# Patient Record
Sex: MALE | Race: WHITE | NOT HISPANIC OR LATINO | URBAN - METROPOLITAN AREA
[De-identification: names, ages, dates, MRNs, and addresses within clinical notes are randomized per-mention and may not be internally consistent; named-entity substitution may affect disease eponyms.]

---

## 2017-06-05 ENCOUNTER — OUTPATIENT (OUTPATIENT)
Dept: OUTPATIENT SERVICES | Facility: HOSPITAL | Age: 82
LOS: 1 days | Discharge: HOME | End: 2017-06-05

## 2017-06-07 DIAGNOSIS — I48.91 UNSPECIFIED ATRIAL FIBRILLATION: ICD-10-CM

## 2017-06-07 DIAGNOSIS — H26.9 UNSPECIFIED CATARACT: ICD-10-CM

## 2017-06-07 DIAGNOSIS — M19.90 UNSPECIFIED OSTEOARTHRITIS, UNSPECIFIED SITE: ICD-10-CM

## 2017-06-07 DIAGNOSIS — N40.0 BENIGN PROSTATIC HYPERPLASIA WITHOUT LOWER URINARY TRACT SYMPTOMS: ICD-10-CM

## 2018-06-15 ENCOUNTER — EMERGENCY (EMERGENCY)
Facility: HOSPITAL | Age: 83
LOS: 1 days | Discharge: HOME | End: 2018-06-15
Admitting: EMERGENCY MEDICINE
Payer: MEDICARE

## 2018-06-15 VITALS
OXYGEN SATURATION: 98 % | RESPIRATION RATE: 18 BRPM | SYSTOLIC BLOOD PRESSURE: 119 MMHG | DIASTOLIC BLOOD PRESSURE: 78 MMHG | TEMPERATURE: 98 F | HEART RATE: 71 BPM

## 2018-06-15 VITALS
SYSTOLIC BLOOD PRESSURE: 150 MMHG | DIASTOLIC BLOOD PRESSURE: 90 MMHG | RESPIRATION RATE: 18 BRPM | TEMPERATURE: 98 F | OXYGEN SATURATION: 100 % | HEART RATE: 70 BPM

## 2018-06-15 DIAGNOSIS — I48.91 UNSPECIFIED ATRIAL FIBRILLATION: ICD-10-CM

## 2018-06-15 DIAGNOSIS — F10.120 ALCOHOL ABUSE WITH INTOXICATION, UNCOMPLICATED: ICD-10-CM

## 2018-06-15 DIAGNOSIS — E78.5 HYPERLIPIDEMIA, UNSPECIFIED: ICD-10-CM

## 2018-06-15 DIAGNOSIS — R41.82 ALTERED MENTAL STATUS, UNSPECIFIED: ICD-10-CM

## 2018-06-15 DIAGNOSIS — Z23 ENCOUNTER FOR IMMUNIZATION: ICD-10-CM

## 2018-06-15 DIAGNOSIS — Z87.891 PERSONAL HISTORY OF NICOTINE DEPENDENCE: ICD-10-CM
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
## 2018-06-15 DIAGNOSIS — I10 ESSENTIAL (PRIMARY) HYPERTENSION: ICD-10-CM

## 2018-06-15 LAB
ALBUMIN SERPL ELPH-MCNC: 3.5 G/DL — SIGNIFICANT CHANGE UP (ref 3.4–5)
ALP SERPL-CCNC: 62 U/L — SIGNIFICANT CHANGE UP (ref 40–120)
ALT FLD-CCNC: 18 U/L — SIGNIFICANT CHANGE UP (ref 12–42)
ANION GAP SERPL CALC-SCNC: 11 MMOL/L — SIGNIFICANT CHANGE UP (ref 9–16)
ANION GAP SERPL CALC-SCNC: 13 MMOL/L — SIGNIFICANT CHANGE UP (ref 9–16)
AST SERPL-CCNC: 33 U/L — SIGNIFICANT CHANGE UP (ref 15–37)
BILIRUB SERPL-MCNC: 0.4 MG/DL — SIGNIFICANT CHANGE UP (ref 0.2–1.2)
BUN SERPL-MCNC: 38 MG/DL — HIGH (ref 7–23)
BUN SERPL-MCNC: 42 MG/DL — HIGH (ref 7–23)
CALCIUM SERPL-MCNC: 7.8 MG/DL — LOW (ref 8.5–10.5)
CALCIUM SERPL-MCNC: 8.4 MG/DL — LOW (ref 8.5–10.5)
CHLORIDE SERPL-SCNC: 107 MMOL/L — SIGNIFICANT CHANGE UP (ref 96–108)
CHLORIDE SERPL-SCNC: 111 MMOL/L — HIGH (ref 96–108)
CK SERPL-CCNC: 215 U/L — SIGNIFICANT CHANGE UP (ref 39–308)
CO2 SERPL-SCNC: 20 MMOL/L — LOW (ref 22–31)
CO2 SERPL-SCNC: 22 MMOL/L — SIGNIFICANT CHANGE UP (ref 22–31)
CREAT SERPL-MCNC: 1.47 MG/DL — HIGH (ref 0.5–1.3)
CREAT SERPL-MCNC: 1.61 MG/DL — HIGH (ref 0.5–1.3)
ETHANOL SERPL-MCNC: 126 MG/DL — HIGH
GLUCOSE SERPL-MCNC: 100 MG/DL — HIGH (ref 70–99)
GLUCOSE SERPL-MCNC: 94 MG/DL — SIGNIFICANT CHANGE UP (ref 70–99)
HCT VFR BLD CALC: 36.7 % — LOW (ref 39–50)
HGB BLD-MCNC: 12.1 G/DL — LOW (ref 13–17)
MAGNESIUM SERPL-MCNC: 2.1 MG/DL — SIGNIFICANT CHANGE UP (ref 1.6–2.6)
MCHC RBC-ENTMCNC: 32.1 PG — SIGNIFICANT CHANGE UP (ref 27–34)
MCHC RBC-ENTMCNC: 33 G/DL — SIGNIFICANT CHANGE UP (ref 32–36)
MCV RBC AUTO: 97.3 FL — SIGNIFICANT CHANGE UP (ref 80–100)
PLATELET # BLD AUTO: 175 K/UL — SIGNIFICANT CHANGE UP (ref 150–400)
POTASSIUM SERPL-MCNC: 3.6 MMOL/L — SIGNIFICANT CHANGE UP (ref 3.5–5.3)
POTASSIUM SERPL-MCNC: 4.7 MMOL/L — SIGNIFICANT CHANGE UP (ref 3.5–5.3)
POTASSIUM SERPL-SCNC: 3.6 MMOL/L — SIGNIFICANT CHANGE UP (ref 3.5–5.3)
POTASSIUM SERPL-SCNC: 4.7 MMOL/L — SIGNIFICANT CHANGE UP (ref 3.5–5.3)
PROT SERPL-MCNC: 7.9 G/DL — SIGNIFICANT CHANGE UP (ref 6.4–8.2)
RBC # BLD: 3.77 M/UL — LOW (ref 4.2–5.8)
RBC # FLD: 14.3 % — SIGNIFICANT CHANGE UP (ref 10.3–16.9)
SODIUM SERPL-SCNC: 140 MMOL/L — SIGNIFICANT CHANGE UP (ref 132–145)
SODIUM SERPL-SCNC: 144 MMOL/L — SIGNIFICANT CHANGE UP (ref 132–145)
TROPONIN I SERPL-MCNC: <0.017 NG/ML — LOW (ref 0.02–0.06)
WBC # BLD: 5.4 K/UL — SIGNIFICANT CHANGE UP (ref 3.8–10.5)
WBC # FLD AUTO: 5.4 K/UL — SIGNIFICANT CHANGE UP (ref 3.8–10.5)

## 2018-06-15 PROCEDURE — 93010 ELECTROCARDIOGRAM REPORT: CPT

## 2018-06-15 PROCEDURE — 70450 CT HEAD/BRAIN W/O DYE: CPT | Mod: 26

## 2018-06-15 PROCEDURE — 99284 EMERGENCY DEPT VISIT MOD MDM: CPT | Mod: 25

## 2018-06-15 RX ORDER — ACETAMINOPHEN 500 MG
650 TABLET ORAL ONCE
Qty: 0 | Refills: 0 | Status: COMPLETED | OUTPATIENT
Start: 2018-06-15 | End: 2018-06-15

## 2018-06-15 RX ORDER — TETANUS TOXOID, REDUCED DIPHTHERIA TOXOID AND ACELLULAR PERTUSSIS VACCINE, ADSORBED 5; 2.5; 8; 8; 2.5 [IU]/.5ML; [IU]/.5ML; UG/.5ML; UG/.5ML; UG/.5ML
0.5 SUSPENSION INTRAMUSCULAR ONCE
Qty: 0 | Refills: 0 | Status: COMPLETED | OUTPATIENT
Start: 2018-06-15 | End: 2018-06-15

## 2018-06-15 RX ORDER — SODIUM CHLORIDE 9 MG/ML
1000 INJECTION INTRAMUSCULAR; INTRAVENOUS; SUBCUTANEOUS ONCE
Qty: 0 | Refills: 0 | Status: COMPLETED | OUTPATIENT
Start: 2018-06-15 | End: 2018-06-15

## 2018-06-15 RX ORDER — KETOROLAC TROMETHAMINE 30 MG/ML
15 SYRINGE (ML) INJECTION ONCE
Qty: 0 | Refills: 0 | Status: DISCONTINUED | OUTPATIENT
Start: 2018-06-15 | End: 2018-06-15

## 2018-06-15 RX ORDER — FAMOTIDINE 10 MG/ML
20 INJECTION INTRAVENOUS ONCE
Qty: 0 | Refills: 0 | Status: COMPLETED | OUTPATIENT
Start: 2018-06-15 | End: 2018-06-15

## 2018-06-15 RX ADMIN — FAMOTIDINE 20 MILLIGRAM(S): 10 INJECTION INTRAVENOUS at 22:20

## 2018-06-15 RX ADMIN — SODIUM CHLORIDE 1000 MILLILITER(S): 9 INJECTION INTRAMUSCULAR; INTRAVENOUS; SUBCUTANEOUS at 22:21

## 2018-06-15 RX ADMIN — Medication 650 MILLIGRAM(S): at 22:44

## 2018-06-15 RX ADMIN — SODIUM CHLORIDE 2000 MILLILITER(S): 9 INJECTION INTRAMUSCULAR; INTRAVENOUS; SUBCUTANEOUS at 21:07

## 2018-06-15 RX ADMIN — SODIUM CHLORIDE 2000 MILLILITER(S): 9 INJECTION INTRAMUSCULAR; INTRAVENOUS; SUBCUTANEOUS at 22:20

## 2018-06-15 RX ADMIN — TETANUS TOXOID, REDUCED DIPHTHERIA TOXOID AND ACELLULAR PERTUSSIS VACCINE, ADSORBED 0.5 MILLILITER(S): 5; 2.5; 8; 8; 2.5 SUSPENSION INTRAMUSCULAR at 22:20

## 2018-06-15 NOTE — ED PROVIDER NOTE - CARE PLAN
Principal Discharge DX:	Atrial fibrillation Principal Discharge DX:	Atrial fibrillation  Secondary Diagnosis:	Altered mental status  Secondary Diagnosis:	Alcoholic intoxication without complication

## 2018-06-15 NOTE — ED ADULT NURSE NOTE - OBJECTIVE STATEMENT
Pt presents to ED with c/o fall and altered mental status- + AOB, admits to drinking today, abrasion and bruising noted to scalp, PERRLA, GCS 15. not on AC's, no LOC

## 2018-06-15 NOTE — ED ADULT TRIAGE NOTE - CHIEF COMPLAINT QUOTE
neighbor called because pt was drinking alcohol, admits to drinking a bottle of vodka and fell at home. pt denies LOC has abrasion to right scalp. awake alert and oriented times 3 in triage, ambulating well.

## 2018-06-15 NOTE — ED ADULT NURSE REASSESSMENT NOTE - NS ED NURSE REASSESS COMMENT FT1
Pt with baseline aphasia and right sided hemiplegia from previous stroke, [facility] BRBPR noted at change of shift - second PVL placed and repeat labs drawn, pt placed on cardiac monitor. MD Vasquez aware and at beside for eval. VSS, patient at her baseline mental status per family. Safety and comfort maintained, will continue to monitor.

## 2018-06-15 NOTE — ED PROVIDER NOTE - OBJECTIVE STATEMENT
89 yo M With PMHx of HTN, HLD 89 yo M With PMHx of HTN, HLD, BPH, not on any AC/ASA, presenting for scalp abrasion and generalized weakness s/p fall at home.  Pt admits to drinking alcohol all day today and lost his balance, fell at home and landed on his scalp region.  Sustained abrasion to the R frontal region, but felt too weak to get up on his own and called for EMS.  Now with abrasion and mild HA.  Denies other prodromes, LOC, bleeding, dizziness, SOB, CP, palpitations, N/V, change in ROM/sensation, abdominal/back/neck pain, focal weakness, change in vision/mental status/speech, paresthesia, and malaise.

## 2018-06-15 NOTE — ED PROVIDER NOTE - MEDICAL DECISION MAKING DETAILS
pt BIBA for AMS, scalp contusion s/p fall at home after alcohol intox, labs with mildly elevated BUN/Cr, likely 2/2 dehydration, no other associated trauma noted, CTH with no ICH noted, EKG with slow A.fib w rate of 80s, no ST-T wave changes, pt reports no h/o A.fib or cardiac arrhythmia in the past, was told he cannot take ASA or NSAIDs by PMD, no active CP or other sx currently, cardiology Dr. Mendoza made aware, given pt's rate is well controlled and unknown prior EKG and unknown chronicity, trop neg, medically cleared for outpt f/u, repeat labs with improved renal function s/p IVF, AxOx3 and conversive with steady gait, will dc home to f/u with PMD and cardiology as outpt, strict return precautions discussed, pt verbalized understanding

## 2018-06-15 NOTE — ED ADULT NURSE NOTE - CHPI ED SYMPTOMS NEG
no deformity/no fever/no tingling/no confusion/no loss of consciousness/no bleeding/no numbness/no vomiting/no weakness

## 2018-06-15 NOTE — ED PROVIDER NOTE - PROGRESS NOTE DETAILS
pt with improved mental status, EKG with slow A.fib w rate of 80s, no ST-T wave changes, pt reports no h/o A.fib or cardiac arrhythmia in the past, was told he cannot take ASA or NSAIDs by PMD, no active CP or other sx currently, cardiology Dr. Mendoza made aware, given pt's rate is well controlled and unknown prior EKG and unknown chronicity, trop neg, medically cleared for outpt f/u

## 2018-06-15 NOTE — ED PROVIDER NOTE - PHYSICAL EXAMINATION
Vital Signs - nursing notes reviewed and confirmed  Gen - WDWN M, +AOB, NAD, comfortable and non-toxic appearing, speaking in full sentences   Skin - warm, dry, abrasion to the R frontal scalp region, no laceration   HEENT - AT/NC, PERRL, EOMI, no conjunctival injection, moist oral mucosa, o/p clear with no erythema, edema, or exudate, uvula midline, airway patent, neck supple and NT, FROM, no JVD or carotid bruits b/l, no palpable nodes  CV - S1S2, R/R/R  Resp - respiration non-labored, CTAB, symmetric bs b/l, no r/r/w  GI - NABS, soft, ND, NT, no rebound or guarding, no CVAT b/l   MS - w/w/p, no c/c/e, calves supple and NT, distal pulses symmetric b/l   Neuro - AxOx3, no focal neuro deficits, CN II-XII grossly intact, clear speech, unsteady gait

## 2018-08-03 ENCOUNTER — EMERGENCY (EMERGENCY)
Facility: HOSPITAL | Age: 83
LOS: 1 days | Discharge: HOME | End: 2018-08-03
Attending: EMERGENCY MEDICINE | Admitting: EMERGENCY MEDICINE
Payer: MEDICARE

## 2018-08-03 VITALS
OXYGEN SATURATION: 98 % | DIASTOLIC BLOOD PRESSURE: 67 MMHG | SYSTOLIC BLOOD PRESSURE: 107 MMHG | RESPIRATION RATE: 20 BRPM | TEMPERATURE: 98 F | HEART RATE: 68 BPM
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
## 2018-08-03 DIAGNOSIS — S01.81XA LACERATION WITHOUT FOREIGN BODY OF OTHER PART OF HEAD, INITIAL ENCOUNTER: ICD-10-CM

## 2018-08-03 DIAGNOSIS — S51.002A UNSPECIFIED OPEN WOUND OF LEFT ELBOW, INITIAL ENCOUNTER: ICD-10-CM
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
## 2018-08-03 DIAGNOSIS — Z23 ENCOUNTER FOR IMMUNIZATION: ICD-10-CM

## 2018-08-03 DIAGNOSIS — S60.812A ABRASION OF LEFT WRIST, INITIAL ENCOUNTER: ICD-10-CM

## 2018-08-03 DIAGNOSIS — I10 ESSENTIAL (PRIMARY) HYPERTENSION: ICD-10-CM

## 2018-08-03 DIAGNOSIS — S61.412A LACERATION WITHOUT FOREIGN BODY OF LEFT HAND, INITIAL ENCOUNTER: ICD-10-CM

## 2018-08-03 DIAGNOSIS — Z88.6 ALLERGY STATUS TO ANALGESIC AGENT: ICD-10-CM

## 2018-08-03 DIAGNOSIS — E78.5 HYPERLIPIDEMIA, UNSPECIFIED: ICD-10-CM
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
## 2018-08-03 LAB
ALBUMIN SERPL ELPH-MCNC: 3.4 G/DL — SIGNIFICANT CHANGE UP (ref 3.4–5)
ALP SERPL-CCNC: 52 U/L — SIGNIFICANT CHANGE UP (ref 40–120)
ALT FLD-CCNC: 19 U/L — SIGNIFICANT CHANGE UP (ref 12–42)
ANION GAP SERPL CALC-SCNC: 11 MMOL/L — SIGNIFICANT CHANGE UP (ref 9–16)
APTT BLD: 39.2 SEC — HIGH (ref 27.5–36.5)
AST SERPL-CCNC: 21 U/L — SIGNIFICANT CHANGE UP (ref 15–37)
BASOPHILS NFR BLD AUTO: 1.2 % — SIGNIFICANT CHANGE UP (ref 0–2)
BILIRUB SERPL-MCNC: 0.3 MG/DL — SIGNIFICANT CHANGE UP (ref 0.2–1.2)
BUN SERPL-MCNC: 37 MG/DL — HIGH (ref 7–23)
CALCIUM SERPL-MCNC: 8.5 MG/DL — SIGNIFICANT CHANGE UP (ref 8.5–10.5)
CHLORIDE SERPL-SCNC: 106 MMOL/L — SIGNIFICANT CHANGE UP (ref 96–108)
CK MB BLD-MCNC: 1.36 % — SIGNIFICANT CHANGE UP
CK MB CFR SERPL CALC: 1.7 NG/ML — SIGNIFICANT CHANGE UP (ref 0.5–3.6)
CO2 SERPL-SCNC: 23 MMOL/L — SIGNIFICANT CHANGE UP (ref 22–31)
CREAT SERPL-MCNC: 1.84 MG/DL — HIGH (ref 0.5–1.3)
EOSINOPHIL NFR BLD AUTO: 6 % — SIGNIFICANT CHANGE UP (ref 0–6)
ETHANOL SERPL-MCNC: 165 MG/DL — HIGH
GLUCOSE SERPL-MCNC: 115 MG/DL — HIGH (ref 70–99)
HCT VFR BLD CALC: 34.4 % — LOW (ref 39–50)
HGB BLD-MCNC: 11.3 G/DL — LOW (ref 13–17)
IMM GRANULOCYTES NFR BLD AUTO: 0.4 % — SIGNIFICANT CHANGE UP (ref 0–1.5)
INR BLD: 2.47 — HIGH (ref 0.88–1.16)
LIDOCAIN IGE QN: 256 U/L — SIGNIFICANT CHANGE UP (ref 73–393)
LYMPHOCYTES # BLD AUTO: 22.2 % — SIGNIFICANT CHANGE UP (ref 13–44)
MAGNESIUM SERPL-MCNC: 2.3 MG/DL — SIGNIFICANT CHANGE UP (ref 1.6–2.6)
MCHC RBC-ENTMCNC: 32 PG — SIGNIFICANT CHANGE UP (ref 27–34)
MCHC RBC-ENTMCNC: 32.8 G/DL — SIGNIFICANT CHANGE UP (ref 32–36)
MCV RBC AUTO: 97.5 FL — SIGNIFICANT CHANGE UP (ref 80–100)
MONOCYTES NFR BLD AUTO: 11.2 % — SIGNIFICANT CHANGE UP (ref 2–14)
NEUTROPHILS NFR BLD AUTO: 59 % — SIGNIFICANT CHANGE UP (ref 43–77)
NT-PROBNP SERPL-SCNC: 2076 PG/ML — HIGH
PLATELET # BLD AUTO: 159 K/UL — SIGNIFICANT CHANGE UP (ref 150–400)
POTASSIUM SERPL-MCNC: 3.7 MMOL/L — SIGNIFICANT CHANGE UP (ref 3.5–5.3)
POTASSIUM SERPL-SCNC: 3.7 MMOL/L — SIGNIFICANT CHANGE UP (ref 3.5–5.3)
PROT SERPL-MCNC: 7.1 G/DL — SIGNIFICANT CHANGE UP (ref 6.4–8.2)
PROTHROM AB SERPL-ACNC: 27.7 SEC — HIGH (ref 9.8–12.7)
RBC # BLD: 3.53 M/UL — LOW (ref 4.2–5.8)
RBC # FLD: 13.4 % — SIGNIFICANT CHANGE UP (ref 10.3–16.9)
SODIUM SERPL-SCNC: 140 MMOL/L — SIGNIFICANT CHANGE UP (ref 132–145)
TROPONIN I SERPL-MCNC: <0.017 NG/ML — LOW (ref 0.02–0.06)
WBC # BLD: 4.8 K/UL — SIGNIFICANT CHANGE UP (ref 3.8–10.5)
WBC # FLD AUTO: 4.8 K/UL — SIGNIFICANT CHANGE UP (ref 3.8–10.5)

## 2018-08-03 PROCEDURE — 99284 EMERGENCY DEPT VISIT MOD MDM: CPT | Mod: 25

## 2018-08-03 PROCEDURE — 70450 CT HEAD/BRAIN W/O DYE: CPT | Mod: 26

## 2018-08-03 PROCEDURE — 70486 CT MAXILLOFACIAL W/O DYE: CPT | Mod: 26

## 2018-08-03 PROCEDURE — 72125 CT NECK SPINE W/O DYE: CPT | Mod: 26

## 2018-08-03 PROCEDURE — 73080 X-RAY EXAM OF ELBOW: CPT | Mod: 26,LT

## 2018-08-03 PROCEDURE — 73110 X-RAY EXAM OF WRIST: CPT | Mod: 26,LT

## 2018-08-03 PROCEDURE — 71045 X-RAY EXAM CHEST 1 VIEW: CPT | Mod: 26

## 2018-08-03 PROCEDURE — 93010 ELECTROCARDIOGRAM REPORT: CPT

## 2018-08-03 RX ORDER — TETANUS TOXOID, REDUCED DIPHTHERIA TOXOID AND ACELLULAR PERTUSSIS VACCINE, ADSORBED 5; 2.5; 8; 8; 2.5 [IU]/.5ML; [IU]/.5ML; UG/.5ML; UG/.5ML; UG/.5ML
0.5 SUSPENSION INTRAMUSCULAR ONCE
Qty: 0 | Refills: 0 | Status: COMPLETED | OUTPATIENT
Start: 2018-08-03 | End: 2018-08-03

## 2018-08-03 RX ORDER — SODIUM CHLORIDE 9 MG/ML
1000 INJECTION INTRAMUSCULAR; INTRAVENOUS; SUBCUTANEOUS ONCE
Qty: 0 | Refills: 0 | Status: COMPLETED | OUTPATIENT
Start: 2018-08-03 | End: 2018-08-03

## 2018-08-03 RX ADMIN — TETANUS TOXOID, REDUCED DIPHTHERIA TOXOID AND ACELLULAR PERTUSSIS VACCINE, ADSORBED 0.5 MILLILITER(S): 5; 2.5; 8; 8; 2.5 SUSPENSION INTRAMUSCULAR at 21:40

## 2018-08-03 RX ADMIN — SODIUM CHLORIDE 1000 MILLILITER(S): 9 INJECTION INTRAMUSCULAR; INTRAVENOUS; SUBCUTANEOUS at 22:54

## 2018-08-03 NOTE — ED PROVIDER NOTE - OBJECTIVE STATEMENT
Pt is a 89yo M with a h/o AF (unsure if on AC but denies asa use), BPH, HTN, HL, who reports mechanical fall 2/2 his arthritis.  Reports falling onto his L side and denies any LOC.  Struck face and LUE to ground.  Multiple abrasions.  Unk last Td.  Denies any preceding CP, palpitations, SOB, HA, or other concerns.  Currently denies any HA, neck pain, abd pain, CP, SOB, N/V, numbness, weakness, or severe joint pain.  +Etoh use.

## 2018-08-03 NOTE — ED PROVIDER NOTE - DIAGNOSTIC INTERPRETATION
Chest x-ray interpreted by ER Physician Dr. Osborn  Findings: +CM, no infiltrates, lungs fully expanded.     Xray L elbow - +old hardware, no acute fracture, no dislocation.    Xray L writs - No fracture, dislocation, FB, or STS

## 2018-08-03 NOTE — ED ADULT NURSE NOTE - OBJECTIVE STATEMENT
pt aox4 with slight tremors, pt otherwise, neurologically wnl. no sob or difficulty breathing noted. lung sounds clear bilaterally. skin appropriate for ethnicity, warm and dry. cap refill < 2 secs. pulses 2+ and irregular. controlled afib noted to tele with hr 70-80's. abd rounded soft and nontender. pt presents with superficial lac above L eyebrow and LUE. Elbow appears to be swollen and deformed. pt denies any pain with full ROM. pt s/p mechanical fall. pt admits to drinking vodka today and everyday.

## 2018-08-03 NOTE — ED PROVIDER NOTE - PMH
Atrial fibrillation    BPH (benign prostatic hyperplasia)    HLD (hyperlipidemia)    HTN (hypertension)

## 2018-08-03 NOTE — ED ADULT NURSE NOTE - NSIMPLEMENTINTERV_GEN_ALL_ED
Implemented All Fall with Harm Risk Interventions:  [place] to call system. Call bell, personal items and telephone within reach. Instruct patient to call for assistance. Room bathroom lighting operational. Non-slip footwear when patient is off stretcher. Physically safe environment: no spills, clutter or unnecessary equipment. Stretcher in lowest position, wheels locked, appropriate side rails in place. Provide visual cue, wrist band, yellow gown, etc. Monitor gait and stability. Monitor for mental status changes and reorient to person, place, and time. Review medications for side effects contributing to fall risk. Reinforce activity limits and safety measures with patient and family. Provide visual clues: red socks.

## 2018-08-03 NOTE — ED PROVIDER NOTE - PHYSICAL EXAMINATION
VITAL SIGNS: I have reviewed nursing notes and confirm.  CONSTITUTIONAL: Well-developed; well-nourished; in no acute distress.   SKIN: Multiple skin tears/avulsions: ~2.5 tear to L proximal dorsal hand, ~2cm abrasion to L dorsal wrist; ~5cm avulsion to L dorsal elbow.  +Superficial lac to mid lower forehead - not gapping and does not open when pressure applied.    HEAD: Normocephalic; see skin exam.   EYES: EOM intact; conjunctiva and sclera clear.  ENT: No nasal discharge; airway clear.  NECK: Supple; non tender.  CARD: S1, S2 normal; no murmurs, gallops, or rubs. Irregularly irregular with normal rate.   RESP: No wheezes, rales or rhonchi.  ABD: Normal bowel sounds; soft; non-distended; non-tender; no hepatosplenomegaly.  EXT: Normal ROM throughout, including L elbow. No clubbing, cyanosis or edema.  NEURO: Patient is alert, oriented x person, place and time.  Cranial nerves 2-12 are intact.  Normal gait and speech.  Cerebellar testing normal:  negative Romberg, normal coordination and normal finger to nose, heal to shin and rapid alternating movements.  Normal proprioception and sensory exam.  No pronator drift.  5/5 bl upper extremity and lower extremity strength.  PSYCH: Cooperative, appropriate.

## 2018-08-03 NOTE — ED PROCEDURE NOTE - GENERAL PROCEDURE DETAILS
Wounds cleansed.  Devitalized skin removed with surgical scissors to all three wounds (2.5, 2. and 5cm respectively).  Dressed with bacitracin and telfa.

## 2018-08-03 NOTE — ED ADULT TRIAGE NOTE - CHIEF COMPLAINT QUOTE
Patient arrived via EMS after bystanders witnessed a fall with unknown LOC with obvious facial injuries and abrasions to face and left arm; patient alert but disoriented; FS in field 113; no deformities noted; unknown if patient is taking blood thinners; patient denies any head or neck pain; patient was hypotensive in field but now normaltensive

## 2018-08-03 NOTE — ED PROVIDER NOTE - PROGRESS NOTE DETAILS
Discussed all results with pt.  Appeared to be dehydrated on labs (elevated BUN/Cr).  Gave gentle hydration.  Discussed with pt and instructed him to f/u with his PCP for re-evaluation on Monday.

## 2018-08-04 PROBLEM — I10 ESSENTIAL (PRIMARY) HYPERTENSION: Chronic | Status: ACTIVE | Noted: 2018-06-15

## 2018-08-04 PROBLEM — N40.0 BENIGN PROSTATIC HYPERPLASIA WITHOUT LOWER URINARY TRACT SYMPTOMS: Chronic | Status: ACTIVE | Noted: 2018-06-15

## 2018-08-04 PROBLEM — E78.5 HYPERLIPIDEMIA, UNSPECIFIED: Chronic | Status: ACTIVE | Noted: 2018-06-15

## 2018-08-21 ENCOUNTER — INPATIENT (INPATIENT)
Facility: HOSPITAL | Age: 83
LOS: 16 days | Discharge: NURSING, REHAB OR LONG-TERM CARE | DRG: 871 | End: 2018-09-07
Attending: INTERNAL MEDICINE | Admitting: INTERNAL MEDICINE
Payer: COMMERCIAL

## 2018-08-21 VITALS
RESPIRATION RATE: 18 BRPM | SYSTOLIC BLOOD PRESSURE: 119 MMHG | DIASTOLIC BLOOD PRESSURE: 80 MMHG | OXYGEN SATURATION: 96 % | HEART RATE: 75 BPM

## 2018-08-21 DIAGNOSIS — N30.00 ACUTE CYSTITIS WITHOUT HEMATURIA: ICD-10-CM

## 2018-08-21 DIAGNOSIS — M62.82 RHABDOMYOLYSIS: ICD-10-CM

## 2018-08-21 DIAGNOSIS — A41.9 SEPSIS, UNSPECIFIED ORGANISM: ICD-10-CM

## 2018-08-21 DIAGNOSIS — N17.9 ACUTE KIDNEY FAILURE, UNSPECIFIED: ICD-10-CM

## 2018-08-21 DIAGNOSIS — G20 PARKINSON'S DISEASE: ICD-10-CM

## 2018-08-21 DIAGNOSIS — Z91.89 OTHER SPECIFIED PERSONAL RISK FACTORS, NOT ELSEWHERE CLASSIFIED: ICD-10-CM

## 2018-08-21 DIAGNOSIS — I48.2 CHRONIC ATRIAL FIBRILLATION: ICD-10-CM

## 2018-08-21 DIAGNOSIS — G92 TOXIC ENCEPHALOPATHY: ICD-10-CM

## 2018-08-21 DIAGNOSIS — I10 ESSENTIAL (PRIMARY) HYPERTENSION: ICD-10-CM

## 2018-08-21 DIAGNOSIS — F10.10 ALCOHOL ABUSE, UNCOMPLICATED: ICD-10-CM

## 2018-08-21 LAB
ALBUMIN SERPL ELPH-MCNC: 3.4 G/DL — SIGNIFICANT CHANGE UP (ref 3.3–5)
ALBUMIN SERPL ELPH-MCNC: 3.7 G/DL — SIGNIFICANT CHANGE UP (ref 3.4–5)
ALP SERPL-CCNC: 65 U/L — SIGNIFICANT CHANGE UP (ref 40–120)
ALP SERPL-CCNC: 82 U/L — SIGNIFICANT CHANGE UP (ref 40–120)
ALT FLD-CCNC: 29 U/L — SIGNIFICANT CHANGE UP (ref 10–45)
ALT FLD-CCNC: 42 U/L — SIGNIFICANT CHANGE UP (ref 12–42)
ANION GAP SERPL CALC-SCNC: 18 MMOL/L — HIGH (ref 5–17)
ANION GAP SERPL CALC-SCNC: 18 MMOL/L — HIGH (ref 9–16)
APPEARANCE UR: CLEAR — SIGNIFICANT CHANGE UP
APTT BLD: 34.9 SEC — SIGNIFICANT CHANGE UP (ref 27.5–36.5)
APTT BLD: 36 SEC — SIGNIFICANT CHANGE UP (ref 27.5–37.4)
AST SERPL-CCNC: 62 U/L — HIGH (ref 10–40)
AST SERPL-CCNC: 82 U/L — HIGH (ref 15–37)
BASE EXCESS BLDA CALC-SCNC: -8.8 MMOL/L — LOW (ref -2–3)
BILIRUB SERPL-MCNC: 0.6 MG/DL — SIGNIFICANT CHANGE UP (ref 0.2–1.2)
BILIRUB SERPL-MCNC: 1 MG/DL — SIGNIFICANT CHANGE UP (ref 0.2–1.2)
BILIRUB UR-MCNC: NEGATIVE — SIGNIFICANT CHANGE UP
BUN SERPL-MCNC: 108 MG/DL — HIGH (ref 7–23)
BUN SERPL-MCNC: 94 MG/DL — HIGH (ref 7–23)
CALCIUM SERPL-MCNC: 8.5 MG/DL — SIGNIFICANT CHANGE UP (ref 8.4–10.5)
CALCIUM SERPL-MCNC: 8.9 MG/DL — SIGNIFICANT CHANGE UP (ref 8.5–10.5)
CHLORIDE SERPL-SCNC: 106 MMOL/L — SIGNIFICANT CHANGE UP (ref 96–108)
CHLORIDE SERPL-SCNC: 108 MMOL/L — SIGNIFICANT CHANGE UP (ref 96–108)
CK MB BLD-MCNC: 1.35 % — SIGNIFICANT CHANGE UP
CK MB CFR SERPL CALC: 28.7 NG/ML — HIGH (ref 0.5–3.6)
CK SERPL-CCNC: 1928 U/L — HIGH (ref 30–200)
CK SERPL-CCNC: 2130 U/L — HIGH (ref 39–308)
CO2 SERPL-SCNC: 17 MMOL/L — LOW (ref 22–31)
CO2 SERPL-SCNC: 19 MMOL/L — LOW (ref 22–31)
COLOR SPEC: YELLOW — SIGNIFICANT CHANGE UP
CREAT SERPL-MCNC: 3.16 MG/DL — HIGH (ref 0.5–1.3)
CREAT SERPL-MCNC: 4.06 MG/DL — HIGH (ref 0.5–1.3)
DIFF PNL FLD: ABNORMAL
ETHANOL SERPL-MCNC: <3 MG/DL — SIGNIFICANT CHANGE UP
GAS PNL BLDA: SIGNIFICANT CHANGE UP
GLUCOSE SERPL-MCNC: 112 MG/DL — HIGH (ref 70–99)
GLUCOSE SERPL-MCNC: 95 MG/DL — SIGNIFICANT CHANGE UP (ref 70–99)
GLUCOSE UR QL: NEGATIVE — SIGNIFICANT CHANGE UP
HCO3 BLDA-SCNC: 12 MMOL/L — LOW (ref 21–28)
HCT VFR BLD CALC: 36 % — LOW (ref 39–50)
HCT VFR BLD CALC: 41 % — SIGNIFICANT CHANGE UP (ref 39–50)
HGB BLD-MCNC: 12.1 G/DL — LOW (ref 13–17)
HGB BLD-MCNC: 13.5 G/DL — SIGNIFICANT CHANGE UP (ref 13–17)
INR BLD: 3.1 — HIGH (ref 0.88–1.16)
INR BLD: 3.21 — HIGH (ref 0.88–1.16)
KETONES UR-MCNC: NEGATIVE — SIGNIFICANT CHANGE UP
LACTATE SERPL-SCNC: 1.5 MMOL/L — SIGNIFICANT CHANGE UP (ref 0.5–2)
LACTATE SERPL-SCNC: 2.8 MMOL/L — HIGH (ref 0.4–2)
LEUKOCYTE ESTERASE UR-ACNC: ABNORMAL
MAGNESIUM SERPL-MCNC: 2.7 MG/DL — HIGH (ref 1.6–2.6)
MCHC RBC-ENTMCNC: 31.6 PG — SIGNIFICANT CHANGE UP (ref 27–34)
MCHC RBC-ENTMCNC: 31.8 PG — SIGNIFICANT CHANGE UP (ref 27–34)
MCHC RBC-ENTMCNC: 32.9 G/DL — SIGNIFICANT CHANGE UP (ref 32–36)
MCHC RBC-ENTMCNC: 33.6 G/DL — SIGNIFICANT CHANGE UP (ref 32–36)
MCV RBC AUTO: 94.7 FL — SIGNIFICANT CHANGE UP (ref 80–100)
MCV RBC AUTO: 96 FL — SIGNIFICANT CHANGE UP (ref 80–100)
NITRITE UR-MCNC: POSITIVE
OSMOLALITY SERPL: 331 MOSM/KG — HIGH (ref 280–301)
PCO2 BLDA: 19 MMHG — LOW (ref 35–48)
PCP SPEC-MCNC: SIGNIFICANT CHANGE UP
PH BLDA: 7.44 — SIGNIFICANT CHANGE UP (ref 7.35–7.45)
PH UR: 5.5 — SIGNIFICANT CHANGE UP (ref 5–8)
PLATELET # BLD AUTO: 170 K/UL — SIGNIFICANT CHANGE UP (ref 150–400)
PLATELET # BLD AUTO: 204 K/UL — SIGNIFICANT CHANGE UP (ref 150–400)
PO2 BLDA: 211 MMHG — HIGH (ref 83–108)
POTASSIUM SERPL-MCNC: 4 MMOL/L — SIGNIFICANT CHANGE UP (ref 3.5–5.3)
POTASSIUM SERPL-MCNC: 4.5 MMOL/L — SIGNIFICANT CHANGE UP (ref 3.5–5.3)
POTASSIUM SERPL-SCNC: 4 MMOL/L — SIGNIFICANT CHANGE UP (ref 3.5–5.3)
POTASSIUM SERPL-SCNC: 4.5 MMOL/L — SIGNIFICANT CHANGE UP (ref 3.5–5.3)
PROT SERPL-MCNC: 6.8 G/DL — SIGNIFICANT CHANGE UP (ref 6–8.3)
PROT SERPL-MCNC: 8.3 G/DL — HIGH (ref 6.4–8.2)
PROT UR-MCNC: 100 MG/DL
PROTHROM AB SERPL-ACNC: 34.9 SEC — HIGH (ref 9.8–12.7)
PROTHROM AB SERPL-ACNC: 36.5 SEC — HIGH (ref 9.8–12.7)
RBC # BLD: 3.8 M/UL — LOW (ref 4.2–5.8)
RBC # BLD: 4.27 M/UL — SIGNIFICANT CHANGE UP (ref 4.2–5.8)
RBC # FLD: 14 % — SIGNIFICANT CHANGE UP (ref 10.3–16.9)
RBC # FLD: 14.1 % — SIGNIFICANT CHANGE UP (ref 10.3–16.9)
SAO2 % BLDA: 100 % — SIGNIFICANT CHANGE UP (ref 95–100)
SODIUM SERPL-SCNC: 141 MMOL/L — SIGNIFICANT CHANGE UP (ref 135–145)
SODIUM SERPL-SCNC: 145 MMOL/L — SIGNIFICANT CHANGE UP (ref 132–145)
SP GR SPEC: 1.02 — SIGNIFICANT CHANGE UP (ref 1–1.03)
TROPONIN I SERPL-MCNC: 0.29 NG/ML — HIGH (ref 0.02–0.06)
TROPONIN T SERPL-MCNC: 0.11 NG/ML — CRITICAL HIGH (ref 0–0.01)
TROPONIN T SERPL-MCNC: 0.13 NG/ML — CRITICAL HIGH (ref 0–0.01)
UROBILINOGEN FLD QL: 0.2 E.U./DL — SIGNIFICANT CHANGE UP
WBC # BLD: 11.5 K/UL — HIGH (ref 3.8–10.5)
WBC # BLD: 9 K/UL — SIGNIFICANT CHANGE UP (ref 3.8–10.5)
WBC # FLD AUTO: 11.5 K/UL — HIGH (ref 3.8–10.5)
WBC # FLD AUTO: 9 K/UL — SIGNIFICANT CHANGE UP (ref 3.8–10.5)

## 2018-08-21 PROCEDURE — 70450 CT HEAD/BRAIN W/O DYE: CPT | Mod: 26

## 2018-08-21 PROCEDURE — 72125 CT NECK SPINE W/O DYE: CPT | Mod: 26

## 2018-08-21 PROCEDURE — 73080 X-RAY EXAM OF ELBOW: CPT | Mod: 26,LT

## 2018-08-21 PROCEDURE — 93010 ELECTROCARDIOGRAM REPORT: CPT

## 2018-08-21 PROCEDURE — 71045 X-RAY EXAM CHEST 1 VIEW: CPT | Mod: 26

## 2018-08-21 PROCEDURE — 99291 CRITICAL CARE FIRST HOUR: CPT

## 2018-08-21 PROCEDURE — 74176 CT ABD & PELVIS W/O CONTRAST: CPT | Mod: 26

## 2018-08-21 PROCEDURE — 99223 1ST HOSP IP/OBS HIGH 75: CPT | Mod: GC

## 2018-08-21 PROCEDURE — 71250 CT THORAX DX C-: CPT | Mod: 26

## 2018-08-21 RX ORDER — METOPROLOL TARTRATE 50 MG
5 TABLET ORAL ONCE
Qty: 0 | Refills: 0 | Status: DISCONTINUED | OUTPATIENT
Start: 2018-08-21 | End: 2018-08-22

## 2018-08-21 RX ORDER — METOPROLOL TARTRATE 50 MG
5 TABLET ORAL ONCE
Qty: 0 | Refills: 0 | Status: COMPLETED | OUTPATIENT
Start: 2018-08-21 | End: 2018-08-21

## 2018-08-21 RX ORDER — SODIUM BICARBONATE 1 MEQ/ML
0.13 SYRINGE (ML) INTRAVENOUS
Qty: 75 | Refills: 0 | Status: DISCONTINUED | OUTPATIENT
Start: 2018-08-21 | End: 2018-08-21

## 2018-08-21 RX ORDER — TAMSULOSIN HYDROCHLORIDE 0.4 MG/1
0.8 CAPSULE ORAL AT BEDTIME
Qty: 0 | Refills: 0 | Status: DISCONTINUED | OUTPATIENT
Start: 2018-08-21 | End: 2018-08-24

## 2018-08-21 RX ORDER — SODIUM CHLORIDE 9 MG/ML
1000 INJECTION INTRAMUSCULAR; INTRAVENOUS; SUBCUTANEOUS ONCE
Qty: 0 | Refills: 0 | Status: COMPLETED | OUTPATIENT
Start: 2018-08-21 | End: 2018-08-21

## 2018-08-21 RX ORDER — METOPROLOL TARTRATE 50 MG
200 TABLET ORAL DAILY
Qty: 0 | Refills: 0 | Status: DISCONTINUED | OUTPATIENT
Start: 2018-08-21 | End: 2018-08-23

## 2018-08-21 RX ORDER — SODIUM CHLORIDE 9 MG/ML
1000 INJECTION, SOLUTION INTRAVENOUS
Qty: 0 | Refills: 0 | Status: DISCONTINUED | OUTPATIENT
Start: 2018-08-21 | End: 2018-08-22

## 2018-08-21 RX ORDER — CEFTRIAXONE 500 MG/1
1 INJECTION, POWDER, FOR SOLUTION INTRAMUSCULAR; INTRAVENOUS EVERY 24 HOURS
Qty: 0 | Refills: 0 | Status: COMPLETED | OUTPATIENT
Start: 2018-08-22 | End: 2018-08-27

## 2018-08-21 RX ORDER — SODIUM CHLORIDE 9 MG/ML
1000 INJECTION INTRAMUSCULAR; INTRAVENOUS; SUBCUTANEOUS
Qty: 0 | Refills: 0 | Status: DISCONTINUED | OUTPATIENT
Start: 2018-08-21 | End: 2018-08-22

## 2018-08-21 RX ORDER — CARBIDOPA AND LEVODOPA 25; 100 MG/1; MG/1
1 TABLET ORAL THREE TIMES A DAY
Qty: 0 | Refills: 0 | Status: DISCONTINUED | OUTPATIENT
Start: 2018-08-21 | End: 2018-08-30

## 2018-08-21 RX ORDER — FINASTERIDE 5 MG/1
5 TABLET, FILM COATED ORAL DAILY
Qty: 0 | Refills: 0 | Status: DISCONTINUED | OUTPATIENT
Start: 2018-08-21 | End: 2018-08-28

## 2018-08-21 RX ORDER — HEPARIN SODIUM 5000 [USP'U]/ML
5000 INJECTION INTRAVENOUS; SUBCUTANEOUS EVERY 8 HOURS
Qty: 0 | Refills: 0 | Status: DISCONTINUED | OUTPATIENT
Start: 2018-08-21 | End: 2018-08-22

## 2018-08-21 RX ORDER — CEFTRIAXONE 500 MG/1
1 INJECTION, POWDER, FOR SOLUTION INTRAMUSCULAR; INTRAVENOUS ONCE
Qty: 0 | Refills: 0 | Status: COMPLETED | OUTPATIENT
Start: 2018-08-21 | End: 2018-08-21

## 2018-08-21 RX ADMIN — SODIUM CHLORIDE 1000 MILLILITER(S): 9 INJECTION INTRAMUSCULAR; INTRAVENOUS; SUBCUTANEOUS at 12:00

## 2018-08-21 RX ADMIN — Medication 0.5 MILLIGRAM(S): at 10:57

## 2018-08-21 RX ADMIN — Medication 5 MILLIGRAM(S): at 19:34

## 2018-08-21 RX ADMIN — TAMSULOSIN HYDROCHLORIDE 0.8 MILLIGRAM(S): 0.4 CAPSULE ORAL at 23:30

## 2018-08-21 RX ADMIN — CARBIDOPA AND LEVODOPA 1 TABLET(S): 25; 100 TABLET ORAL at 23:31

## 2018-08-21 RX ADMIN — Medication 150 MEQ/KG/HR: at 13:27

## 2018-08-21 RX ADMIN — HEPARIN SODIUM 5000 UNIT(S): 5000 INJECTION INTRAVENOUS; SUBCUTANEOUS at 22:00

## 2018-08-21 RX ADMIN — SODIUM CHLORIDE 1000 MILLILITER(S): 9 INJECTION INTRAMUSCULAR; INTRAVENOUS; SUBCUTANEOUS at 18:24

## 2018-08-21 RX ADMIN — Medication 5 MILLIGRAM(S): at 23:15

## 2018-08-21 RX ADMIN — CEFTRIAXONE 100 GRAM(S): 500 INJECTION, POWDER, FOR SOLUTION INTRAMUSCULAR; INTRAVENOUS at 12:08

## 2018-08-21 RX ADMIN — SODIUM CHLORIDE 1000 MILLILITER(S): 9 INJECTION INTRAMUSCULAR; INTRAVENOUS; SUBCUTANEOUS at 10:56

## 2018-08-21 RX ADMIN — SODIUM CHLORIDE 1000 MILLILITER(S): 9 INJECTION INTRAMUSCULAR; INTRAVENOUS; SUBCUTANEOUS at 12:08

## 2018-08-21 RX ADMIN — SODIUM CHLORIDE 150 MILLILITER(S): 9 INJECTION INTRAMUSCULAR; INTRAVENOUS; SUBCUTANEOUS at 18:24

## 2018-08-21 RX ADMIN — CEFTRIAXONE 1 GRAM(S): 500 INJECTION, POWDER, FOR SOLUTION INTRAMUSCULAR; INTRAVENOUS at 13:02

## 2018-08-21 RX ADMIN — Medication 5 MILLIGRAM(S): at 21:29

## 2018-08-21 RX ADMIN — SODIUM CHLORIDE 100 MILLILITER(S): 9 INJECTION, SOLUTION INTRAVENOUS at 22:00

## 2018-08-21 RX ADMIN — Medication 0.5 MILLIGRAM(S): at 14:45

## 2018-08-21 NOTE — H&P ADULT - PROBLEM SELECTOR PLAN 1
Patient hypothermic and tachycardic on presentation with elevated lactate and TME. Granted in the setting of mixed picture with multiple underlying conditions unclear if vital signs and TME are in the setting of sepsis. Will be treated as such.   -S/p 3L NS - appropriately fluid resuscitated  -Lactate Cleared  -UTI possible source, being treated with Ceftriaxone 1g q24h  -Reperfusion exam performed, patient with good cap refill, +2 pulses, and warm extremities

## 2018-08-21 NOTE — H&P ADULT - PMH
Atrial fibrillation    BPH (benign prostatic hyperplasia)    HLD (hyperlipidemia)    HTN (hypertension)    Parkinson's disease

## 2018-08-21 NOTE — H&P ADULT - PROBLEM SELECTOR PLAN 2
Most likely in setting of ARF + Sepsis also unclear when last dose of Sinemet was and could be suffering from withdrawal from dopaminergic substances  -Continue IV hydration  -Restart Sinemet 25/100  -Trend BMP/lytes

## 2018-08-21 NOTE — ED PROVIDER NOTE - ATTENDING CONTRIBUTION TO CARE
Pt is a 89yo M with a h/o AF (unsure if on AC but denies asa use), BPH, HTN, HL, BIBEMS from home after being found face down on the floor by his cleaning lady w/ periorbital swelling and bruising to bilateral cheeks. It is unclear how long patient was down. Patient is altered and unable to provide a clear history. Reviewing previous note, he has a h/o alcohol abuse and does admit to drinking 3 days ago but cannot provide further details about the fall. When asked if anything hurts, patient denies any complaints of pain.      vs: hr 120's irreg irreg on cardiac monitor, /80.  exam: pt awake, confused.  HEENT: mild bilat periorbital ecchymosis, no step-offs or midface instability. dentition intact, neg Hernadez's/raccoons. C-spine no midline step-offs or pain.    cor: irreg irreg tachycardiac, no m/c/r.  Lungs: clear equal bs bilat  abd: snt, + bs.  neuro: confused, thinks his wife is on vacation (he is a ), thinks it is 1966.  moving all 4 extremities, sensory grossly intact. Mild tremor at rest.    data: urine tea colored, CK elevated creatinine 4, + troponin.  ekg: afib with rvr    imp: rhabdomylosis, severe volume depletion, ams  plan: IVF's, consult cardiology, admit medicine for ivf's/inability to perform adl's. Pt is a 89yo M with a h/o AF (unsure if on AC but denies asa use), BPH, HTN, HL, BIBEMS from home after being found face down on the floor by his cleaning lady w/ periorbital swelling and bruising to bilateral cheeks. It is unclear how long patient was down. Patient is altered and unable to provide a clear history. Reviewing previous note, he has a h/o alcohol abuse and does admit to drinking 3 days ago but cannot provide further details about the fall. When asked if anything hurts, patient denies any complaints of pain.      vs: hr 120's irreg irreg on cardiac monitor, /80.  exam: pt awake, confused.  HEENT: mild bilat periorbital ecchymosis, no step-offs or midface instability. dentition intact, neg Hernadez's/raccoons. C-spine no midline step-offs or pain.    cor: irreg irreg tachycardiac, no m/c/r.  Lungs: clear equal bs bilat  abd: snt, + bs.  neuro: confused, thinks it is 1966.  moving all 4 extremities, sensory grossly intact. Mild tremor at rest.    data: urine tea colored, CK elevated creatinine 4, + troponin.  ekg: afib with rvr    imp: rhabdomylosis, severe volume depletion, ams  plan: IVF's, consult cardiology, admit medicine for ivf's/inability to perform adl's.

## 2018-08-21 NOTE — H&P ADULT - PROBLEM SELECTOR PLAN 10
F: 150cc NS/h  E: Replete PRN  N: NPO until Dysphagia screen  P: HSQ   C: FULL CODE - pending discussion of GOC with family  D: Admit to [facility] pending improvement     1) PCP Contacted on Admission: (Y/N) --> Name & Phone #: Dr. Reji Paulino  2) Date of Contact with PCP:  3) PCP Contacted at Discharge: (Y/N, N/A)  4) Summary of Handoff Given to PCP:   5) Post-Discharge Appointment Date and Location:

## 2018-08-21 NOTE — H&P ADULT - PROBLEM SELECTOR PLAN 7
-Hold Coumadin in setting of Supra INR  -Continue Toprol-XL 200mg, Afib with RVR response likely in setting of withdrawal of large dose BB

## 2018-08-21 NOTE — ED PROVIDER NOTE - PROGRESS NOTE DETAILS
Angle Lutz, wife's contact information home: [phone], cell: [phone]  PCP Dr. Reji Paulino's contact information: [phone]  Information obtained by . Severe volume depletion/dehydration, rhabdomyolysis, acute kidney injury, UTI, fall. on 2nd liter NS, received ativan 0.5mg for possible alcohol withdrawal, Ceftriaxone IV for UTI, +chan in place, making urine, mental status unchanged. Discussed mildly elevated trop with cards Dr. Talbert, probably from demand/NILS. discussed case with Dr. Oneal, intensivist,  will admit to medicine tele for further management. Dr Oneal recommended changing IV fluids to 1/2ns with 1.5 amps bicarb per 1L, rate 150cc/hour after 2L NS finishes. Spoke with wife, Angle Yepez at above phone number, home meds: tamsulosin, coumadin, carbidopa-levodopa, she cannot recall rest of medications. She is aware patient is being admitted to [facility].

## 2018-08-21 NOTE — H&P ADULT - NSHPPHYSICALEXAM_GEN_ALL_CORE
VITALS  Vital Signs Last 24 Hrs  T(C): 35.9 (21 Aug 2018 10:11), Max: 35.9 (21 Aug 2018 10:11)  T(F): 96.6 (21 Aug 2018 10:11), Max: 96.6 (21 Aug 2018 10:11)  HR: 114 (21 Aug 2018 15:35) (75 - 120)  BP: 137/90 (21 Aug 2018 15:35) (119/80 - 137/90)  BP(mean): --  RR: 18 (21 Aug 2018 13:32) (18 - 18)  SpO2: 100% (21 Aug 2018 15:35) (96% - 100%)    I&O's Summary    21 Aug 2018 07:01  -  21 Aug 2018 17:08  --------------------------------------------------------  IN: 2050 mL / OUT: 2000 mL / NET: 50 mL        CAPILLARY BLOOD GLUCOSE    PHYSICAL EXAM  General: A&Ox2; NAD; lying in bed with his eyes closed, responding softly to questions, multiple scrapes and bruises   Head: NC/AT; PERRL; EOMI; anicteric sclera; Dry MM with cracked lips  Neck: Supple; no JVD  Respiratory: CTA B/L; no wheezes/crackles/rales auscultated w/ good air movement  Cardiovascular: Regular rhythm/rate; S1/S2; no gallops or murmurs auscultated  Gastrointestinal: Soft; NTND w/out rebound tenderness or guarding; bowel sounds normal  Extremities: WWP; no edema or cyanosis; radial/pedal pulses palpable  Neurological:  Resting pill rolling tremor noted  Skin: Dry sloughed skin on upper and lower extremity

## 2018-08-21 NOTE — H&P ADULT - NSHPLABSRESULTS_GEN_ALL_CORE
LABS                        12.1   9.0   )-----------( 170      ( 21 Aug 2018 15:23 )             36.0         141  |  106  |  94<H>  ----------------------------<  95  4.0   |  17<L>  |  3.16<H>    Ca    8.5      21 Aug 2018 15:23  Mg     2.7         TPro  6.8  /  Alb  3.4  /  TBili  0.6  /  DBili  x   /  AST  62<H>  /  ALT  29  /  AlkPhos  65      PT/INR - ( 21 Aug 2018 15:23 )   PT: 36.5 sec;   INR: 3.21          PTT - ( 21 Aug 2018 15:23 )  PTT:36.0 sec  Urinalysis Basic - ( 21 Aug 2018 10:44 )    Color: Yellow / Appearance: Clear / S.025 / pH: x  Gluc: x / Ketone: NEGATIVE  / Bili: NEGATIVE / Urobili: 0.2 E.U./dL   Blood: x / Protein: 100 mg/dL / Nitrite: POSITIVE   Leuk Esterase: Small / RBC: Many /HPF / WBC > 10 /HPF   Sq Epi: x / Non Sq Epi: x / Bacteria: Many /HPF      CARDIAC MARKERS ( 21 Aug 2018 15:23 )  x     / 0.13 ng/mL / 1928 U/L / x     / x      CARDIAC MARKERS ( 21 Aug 2018 10:31 )  0.286 ng/mL / x     / 2130 U/L / x     / 28.7 ng/mL

## 2018-08-21 NOTE — ED PROVIDER NOTE - PHYSICAL EXAMINATION
bruising to the face  NEURO: A&Ox1.   appears dehydrated.   Ecchymosis to the L buttock. Swelling to the L elbow.

## 2018-08-21 NOTE — ED PROVIDER NOTE - MEDICAL DECISION MAKING DETAILS
AMS, found on floor, bruising to face and body, hx alcohol abuse, afib, history limited, appears dehydrated, confused, tremulous possible alcohol withdrawal, will obtain labs, IVF, ativan 0.5mg, ct brain/cspine, chest/abdomen, ua, chan, cardiac monitor, anticipate admission

## 2018-08-21 NOTE — H&P ADULT - NSHPREVIEWOFSYSTEMS_GEN_ALL_CORE
REVIEW OF SYSTEMS:    CONSTITUTIONAL: No weakness, fevers +chills  EYES/ENT: No visual changes;  No vertigo or throat pain   NECK: No pain or stiffness  RESPIRATORY: No cough, wheezing, hemoptysis; No shortness of breath  CARDIOVASCULAR: No chest pain or palpitations  GASTROINTESTINAL: No abdominal or epigastric pain. No nausea, vomiting, or hematemesis; No diarrhea or constipation. No melena or hematochezia.  GENITOURINARY: No dysuria, frequency or hematuria  NEUROLOGICAL: No numbness or weakness  SKIN: No itching, burning, rashes, or lesions   All other review of systems is negative unless indicated above.

## 2018-08-21 NOTE — H&P ADULT - HISTORY OF PRESENT ILLNESS
89M with Parkinson's (on Sinemet), Afib (Toprol XL and Coumadin), HTN, BPH and ETOH abuse who was BIBEMS after being found down at home. Patient was found face down on the floor by his cleaning lady today w/ periorbital swelling and bruising to bilateral cheeks. As per wife patient was last seen well 5 days PTA but she spoke to him on the phone 2 days PTA. Patient was altered on presentation an unable to provide history.     [facility]  [facility], /91, RR18 [id number]%. Labs showed BUN/Cr significantly elevated from baseline. Minor trop elevation. UA positive for LE, WBC, Nitrite, and Bacteria. Patient was given Ceftriaxone, 0.5mg Ativan due to tremor, and 2LNS. He was started on bicarb drip and transferred to [facility].     On arrival to [facility] patient minimally responsive. He affirmed to chills, denied fever. Affirmed to nausea. Denied vomiting, abd pain, changes in stool, blood in stool. He denies changes in urinary habits.

## 2018-08-21 NOTE — H&P ADULT - PROBLEM/PLAN-8
DISPLAY PLAN FREE TEXT Normal vision: sees adequately in most situations; can see medication labels, newsprint

## 2018-08-21 NOTE — ED ADULT TRIAGE NOTE - CHIEF COMPLAINT QUOTE
elderly pt from home found face down by cleaning lady who comes on tuesdays, spouse is on vacation, arrives confused with periorbital swelling and redness on bl cheeks

## 2018-08-21 NOTE — ED PROVIDER NOTE - CARE PLAN
Principal Discharge DX:	Volume depletion  Secondary Diagnosis:	Acute renal failure  Secondary Diagnosis:	Rhabdomyolysis

## 2018-08-21 NOTE — ED PROVIDER NOTE - OBJECTIVE STATEMENT
Pt is a 89yo M with a h/o AF (unsure if on AC but denies asa use), BPH, HTN, HL, BIBEMS from home after being found face down on the floor by his cleaning lady. It is unclear how long patient was down. Patient is altered and unable to provide a clear history. Pt is a 89yo M with a h/o AF (unsure if on AC but denies asa use), BPH, HTN, HL, BIBEMS from home after being found face down on the floor by his cleaning lady. It is unclear how long patient was down. Patient is altered and unable to provide a clear history. Reviewing previous note, he has a h/o alcohol abuse and does admit to drinking 3 days ago but cannot provide further details about falling. When asked if anything hurts, patient denies any complaints of pain. Pt is a 89yo M with a h/o AF (unsure if on AC but denies asa use), BPH, HTN, HL, BIBEMS from home after being found face down on the floor by his cleaning lady w/ periorbital swelling and bruising to bilateral cheeks. It is unclear how long patient was down. Patient is altered and unable to provide a clear history. Reviewing previous note, he has a h/o alcohol abuse and does admit to drinking 3 days ago but cannot provide further details about falling. When asked if anything hurts, patient denies any complaints of pain. Pt is a 89yo M with a h/o AF (unsure if on AC but denies asa use), BPH, HTN, HL, BIBEMS from home after being found face down on the floor by his cleaning lady w/ periorbital swelling and bruising to bilateral cheeks. It is unclear how long patient was down. Patient is altered and unable to provide a clear history. Reviewing previous note, he has a h/o alcohol abuse and does admit to drinking 3 days ago but cannot provide further details about the fall. When asked if anything hurts, patient denies any complaints of pain.    Chart reviews shows previous ER visits for alcohol use and falls and that he has been on coumadin in the past w/ h/o AFIB. No phone numbers available to call family or friends. 89yo M with a h/o AF,  BPH, HTN, HL, BIBEMS from home after being found face down on the floor by his cleaning lady today w/ periorbital swelling and bruising to bilateral cheeks. It is unclear how long patient was down. Patient is altered and unable to provide a history. Chart review shows he has a h/o alcohol abuse - patient does admit to drinking 3 days ago but cannot provide further details about the fall. Patient has no complaints upon evaluation.    Chart reviews shows previous ED visits for alcohol use and falls and that he has been on coumadin in the past w/ h/o AFIB. No phone numbers available to call family or friends.

## 2018-08-22 DIAGNOSIS — T79.6XXA TRAUMATIC ISCHEMIA OF MUSCLE, INITIAL ENCOUNTER: ICD-10-CM

## 2018-08-22 DIAGNOSIS — Z51.5 ENCOUNTER FOR PALLIATIVE CARE: ICD-10-CM

## 2018-08-22 LAB
ANION GAP SERPL CALC-SCNC: 17 MMOL/L — SIGNIFICANT CHANGE UP (ref 5–17)
APTT BLD: 33.1 SEC — SIGNIFICANT CHANGE UP (ref 27.5–37.4)
BUN SERPL-MCNC: 77 MG/DL — HIGH (ref 7–23)
CALCIUM SERPL-MCNC: 8.6 MG/DL — SIGNIFICANT CHANGE UP (ref 8.4–10.5)
CHLORIDE SERPL-SCNC: 112 MMOL/L — HIGH (ref 96–108)
CHLORIDE UR-SCNC: 60 MMOL/L — SIGNIFICANT CHANGE UP
CO2 SERPL-SCNC: 17 MMOL/L — LOW (ref 22–31)
CREAT ?TM UR-MCNC: 82 MG/DL — SIGNIFICANT CHANGE UP
CREAT SERPL-MCNC: 2.33 MG/DL — HIGH (ref 0.5–1.3)
GLUCOSE SERPL-MCNC: 96 MG/DL — SIGNIFICANT CHANGE UP (ref 70–99)
HCT VFR BLD CALC: 33.7 % — LOW (ref 39–50)
HGB BLD-MCNC: 11.1 G/DL — LOW (ref 13–17)
INR BLD: 3.14 — HIGH (ref 0.88–1.16)
MAGNESIUM SERPL-MCNC: 2.1 MG/DL — SIGNIFICANT CHANGE UP (ref 1.6–2.6)
MCHC RBC-ENTMCNC: 31.5 PG — SIGNIFICANT CHANGE UP (ref 27–34)
MCHC RBC-ENTMCNC: 32.9 G/DL — SIGNIFICANT CHANGE UP (ref 32–36)
MCV RBC AUTO: 95.7 FL — SIGNIFICANT CHANGE UP (ref 80–100)
PLATELET # BLD AUTO: 148 K/UL — LOW (ref 150–400)
POTASSIUM SERPL-MCNC: 3.6 MMOL/L — SIGNIFICANT CHANGE UP (ref 3.5–5.3)
POTASSIUM SERPL-SCNC: 3.6 MMOL/L — SIGNIFICANT CHANGE UP (ref 3.5–5.3)
POTASSIUM UR-SCNC: 26 MMOL/L — SIGNIFICANT CHANGE UP
PROTHROM AB SERPL-ACNC: 35.7 SEC — HIGH (ref 9.8–12.7)
RBC # BLD: 3.52 M/UL — LOW (ref 4.2–5.8)
RBC # FLD: 13.9 % — SIGNIFICANT CHANGE UP (ref 10.3–16.9)
SODIUM SERPL-SCNC: 146 MMOL/L — HIGH (ref 135–145)
SODIUM UR-SCNC: 72 MMOL/L — SIGNIFICANT CHANGE UP
UUN UR-MCNC: 966 MG/DL — SIGNIFICANT CHANGE UP
WBC # BLD: 5.6 K/UL — SIGNIFICANT CHANGE UP (ref 3.8–10.5)
WBC # FLD AUTO: 5.6 K/UL — SIGNIFICANT CHANGE UP (ref 3.8–10.5)

## 2018-08-22 PROCEDURE — 99233 SBSQ HOSP IP/OBS HIGH 50: CPT | Mod: GC

## 2018-08-22 PROCEDURE — 99223 1ST HOSP IP/OBS HIGH 75: CPT

## 2018-08-22 PROCEDURE — 71045 X-RAY EXAM CHEST 1 VIEW: CPT | Mod: 26

## 2018-08-22 PROCEDURE — 93306 TTE W/DOPPLER COMPLETE: CPT | Mod: 26

## 2018-08-22 RX ORDER — METOPROLOL TARTRATE 50 MG
5 TABLET ORAL ONCE
Qty: 0 | Refills: 0 | Status: COMPLETED | OUTPATIENT
Start: 2018-08-22 | End: 2018-08-22

## 2018-08-22 RX ORDER — BACITRACIN ZINC 500 UNIT/G
1 OINTMENT IN PACKET (EA) TOPICAL DAILY
Qty: 0 | Refills: 0 | Status: DISCONTINUED | OUTPATIENT
Start: 2018-08-22 | End: 2018-09-07

## 2018-08-22 RX ORDER — SODIUM CHLORIDE 9 MG/ML
1000 INJECTION, SOLUTION INTRAVENOUS
Qty: 0 | Refills: 0 | Status: DISCONTINUED | OUTPATIENT
Start: 2018-08-22 | End: 2018-08-23

## 2018-08-22 RX ORDER — DIGOXIN 250 MCG
0.25 TABLET ORAL ONCE
Qty: 0 | Refills: 0 | Status: COMPLETED | OUTPATIENT
Start: 2018-08-22 | End: 2018-08-22

## 2018-08-22 RX ORDER — DIGOXIN 250 MCG
0.5 TABLET ORAL ONCE
Qty: 0 | Refills: 0 | Status: COMPLETED | OUTPATIENT
Start: 2018-08-22 | End: 2018-08-22

## 2018-08-22 RX ADMIN — HEPARIN SODIUM 5000 UNIT(S): 5000 INJECTION INTRAVENOUS; SUBCUTANEOUS at 06:19

## 2018-08-22 RX ADMIN — SODIUM CHLORIDE 75 MILLILITER(S): 9 INJECTION, SOLUTION INTRAVENOUS at 23:03

## 2018-08-22 RX ADMIN — HEPARIN SODIUM 5000 UNIT(S): 5000 INJECTION INTRAVENOUS; SUBCUTANEOUS at 13:32

## 2018-08-22 RX ADMIN — CARBIDOPA AND LEVODOPA 1 TABLET(S): 25; 100 TABLET ORAL at 13:33

## 2018-08-22 RX ADMIN — Medication 200 MILLIGRAM(S): at 06:20

## 2018-08-22 RX ADMIN — SODIUM CHLORIDE 75 MILLILITER(S): 9 INJECTION INTRAMUSCULAR; INTRAVENOUS; SUBCUTANEOUS at 10:52

## 2018-08-22 RX ADMIN — CARBIDOPA AND LEVODOPA 1 TABLET(S): 25; 100 TABLET ORAL at 06:20

## 2018-08-22 RX ADMIN — Medication 0.5 MILLIGRAM(S): at 01:39

## 2018-08-22 RX ADMIN — Medication 0.25 MILLIGRAM(S): at 06:20

## 2018-08-22 RX ADMIN — Medication 0.25 MILLIGRAM(S): at 13:29

## 2018-08-22 RX ADMIN — Medication 1 APPLICATION(S): at 19:26

## 2018-08-22 RX ADMIN — CEFTRIAXONE 100 GRAM(S): 500 INJECTION, POWDER, FOR SOLUTION INTRAMUSCULAR; INTRAVENOUS at 13:27

## 2018-08-22 RX ADMIN — Medication 5 MILLIGRAM(S): at 21:53

## 2018-08-22 NOTE — PROGRESS NOTE ADULT - ATTENDING COMMENTS
Patient seen and examined with house-staff during bedside rounds.  Resident note read, including vitals, physical findings, laboratory data, and radiological reports.   Revisions included below.  Direct personal management at bed side and extensive interpretation of the data.  Plan was outlined and discussed in details with the housestaff.  Decision making of high complexity  Action taken for acute disease activity to reflect the level of care provided:  - medication reconciliation  - review laboratory data  Mental status improved. Continue IV hydration. Avoid all fluid overload. Renal function improved. Dysphagia evaluation. Decreased for Parkinson meds. Restart beta blocker to control heart rate. Patient is not a candidate for anticoagulation. Palliative care consult

## 2018-08-22 NOTE — CONSULT NOTE ADULT - SUBJECTIVE AND OBJECTIVE BOX
SADIA STEPHENSON   MRN-[id number]     (1927):     HPI:  89M with Parkinson's (on Sinemet), Afib (Toprol XL and Coumadin), HTN, BPH and ETOH abuse who was BIBEMS after being found down at home. Patient was found face down on the floor by his cleaning lady today w/ periorbital swelling and bruising to bilateral cheeks. As per wife patient was last seen well 5 days PTA but she spoke to him on the phone 2 days PTA. Patient was altered on presentation an unable to provide history.     At [facility] T 96.5,  in Afib, /91, RR18 [id number]%. Labs showed BUN/Cr significantly elevated from baseline. Minor trop elevation. UA positive for LE, WBC, Nitrite, and Bacteria. Patient was given Ceftriaxone, 0.5mg Ativan due to tremor, and 2LNS. He was started on bicarb drip and transferred to [facility].     On arrival to [facility] patient minimally responsive. He affirmed to chills, denied fever. Affirmed to nausea. Denied vomiting, abd pain, changes in stool, blood in stool. He denies changes in urinary habits. (21 Aug 2018 16:56)    Additional History per patients wife:  Patient has history of alcohol use, usually the 1.5 oz bottles of vodka, but  found a larger bottle when she cam to the apartment on Tuesday.  Patient is - but wife went to [place] over the weekend to assist in the care of a sick family member.  Although she urged patient to join her in [place], he chose to stay home.  Wife last spoke to him on .    Patient with known Parkinsons, on treatment.  Walking is generally ok except for pain in his knees.  S/P right knee replacement- but doctors were unwilling to do the left.    Patient in [facility] ED August 3 after a fall and was sent home      PAST MEDICAL & SURGICAL HISTORY:  Parkinson's disease  BPH (benign prostatic hyperplasia)  HLD (hyperlipidemia)  HTN (hypertension)  Atrial fibrillation  No significant past surgical history      FAMILY HISTORY:  no known        ROS:    Unable to obtain due to: patient is unreliable and inconsistent historian, although there has been some improvement since admission                     Dyspnea (Carissa 0-10):    denies                    N/V (Y/N):                N              Secretions (Y/N) :    N            Agitation(Y/N):        N  Pain (Y/N):     both knees, but cannot describe further  -Provocation/Palliation:  -Quality/Quantity:  -Radiating:  -Severity:  -Timing/Frequency:  -Impact on ADLs:    General:  no details available and patient cannot provide information  HEENT:  no details available and patient cannot provide information  Neck:  no details available and patient cannot provide information  CVS: denies CP or SOB  Resp:  Denied  GI:  no details available and patient cannot provide information  :  no details available and patient cannot provide information  Musc:  see details re knees above  Neuro: Parkinsons, level of function not totally clear  Psych:  Denied  Skin:  no details available and patient cannot provide information  Lymph:  no details available and patient cannot provide information    Allergies    aspirin (Unknown)    Intolerances        Opiate Naive (Y/N): yes, med list obtained from patient's pharmacy      Medications:      MEDICATIONS  (STANDING):  BACItracin   Ointment 1 Application(s) Topical daily  carbidopa/levodopa  25/100 1 Tablet(s) Oral three times a day  cefTRIAXone   IVPB 1 Gram(s) IV Intermittent every 24 hours  finasteride 5 milliGRAM(s) Oral daily  LORazepam   Injectable 0.5 milliGRAM(s) IV Push once  metoprolol succinate  milliGRAM(s) Oral daily  sodium chloride 0.9%. 1000 milliLiter(s) (75 mL/Hr) IV Continuous <Continuous>  tamsulosin 0.8 milliGRAM(s) Oral at bedtime    MEDICATIONS  (PRN):  ALSO ON COUMADIN AT HOME.  PRESENTLY BEING HELD    Labs:    CBC:                        11.1   5.6   )-----------( 148      ( 22 Aug 2018 06:59 )             33.7     CMP:        146<H>  |  112<H>  |  77<H>  ----------------------------<  96  3.6   |  17<L>  |  2.33<H>    Ca    8.6      22 Aug 2018 06:59  Mg     2.1         TPro  6.8  /  Alb  3.4  /  TBili  0.6  /  DBili  x   /  AST  62<H>  /  ALT  29  /  AlkPhos  65      PT/INR - ( 22 Aug 2018 06:59 )   PT: 35.7 sec;   INR: 3.14          PTT - ( 22 Aug 2018 06:59 )  PTT:33.1 sec  Urinalysis Basic - ( 21 Aug 2018 10:44 )    Color: Yellow / Appearance: Clear / S.025 / pH: x  Gluc: x / Ketone: NEGATIVE  / Bili: NEGATIVE / Urobili: 0.2 E.U./dL   Blood: x / Protein: 100 mg/dL / Nitrite: POSITIVE   Leuk Esterase: Small / RBC: Many /HPF / WBC > 10 /HPF   Sq Epi: x / Non Sq Epi: x / Bacteria: Many /HPF    Lactate, Blood: 2.8: 18 10:52-18 10:53  SADIA STEPHENSON   1927  MRN [id number]  TYPE:(C=Critical, N=Notification, A=Abnormal) N  TESTS: LACTATE=2.8  DATE/TIME CALLED: 18 10:52-18 10:53  CALLED TO: RN. JILL MYERSON  READ BACK (2 Patient Identifiers)(Y/N): Y  READ BACK VALUES (Y/N): Y  CALLED BY:HLP mmoL/L (18 @ 10:31)        Imaging:  Reviewed   < from: CT Head No Cont (18 @ 11:24) >  IMPRESSION:  1. No CT evidence of acute intracranial hemorrhage.  2. 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  3. No significant change compared to the prior study of 8/3/2018.    < end of copied text >      < from: CT Abdomen and Pelvis No Cont (18 @ 11:58) >  IMPRESSION:    No acute thoracic or abdominopelvic pathology.    < end of copied text >      < from: CT Cervical Spine No Cont (18 @ 11:59) >  mpression: Mild degenerative changes of the cervical spine with no   evidence of acute fractures.    < end of copied text >        PEx:  T(C): 36.6 (18 @ 13:00), Max: 37.7 (18 @ 23:11)  HR: 134 (18 @ 08:49) (114 - 134)  BP: 131/80 (18 @ 08:49) (96/51 - 153/66)  RR: 16 (18 @ 05:25) (16 - 17)  SpO2: 99% (18 @ 08:49) (99% - 100%)  Wt(kg): --  Daily     Daily   CAPILLARY BLOOD GLUCOSE        I&O's Summary    21 Aug 2018 07:  -  22 Aug 2018 07:00  --------------------------------------------------------  IN: 2050 mL / OUT: 2900 mL / NET: -850 mL    22 Aug 2018 07:  -  22 Aug 2018 14:29  --------------------------------------------------------  IN: 0 mL / OUT: 1200 mL / NET: -1200 mL        General: elderly plethoric gentleman in NAD, sometimes difficult to understand ? mild confusion  HEENT:  non-icteric sclera, multiple bruises abrasion on chin and both cheeks  Neck: no adenopathy  CVS: irreg tachycardia, distant, no murmur heard  Resp: clear  GI:  protuberant, normal BS, non-tender  :  normal  Musc:   equal strength, no significant wasting  Neuro: non focal, oriented to person place, but not accurate to time.  stories somewhat disjointed  Psych:   confused  Skin: diffuse actinic keratoses and evidence of sun damage, multiple abrasions including both knees  Lymph:no adenopathy  Preadmit Karnofsky:  	                    presumed 60-70%           Current Karnofsky:     40%  Cachexia (Y/N): no  BMI:    Advanced Directives:     Full Code    ll     Decision maker: Patient presently not able to make complex medical decisions  Legal surrogate:    Social History:     GOALS OF CARE DISCUSSION       Palliative care info/counseling provided	           Family meeting       Advanced Directives addressed please see Advance Care Planning Note	           See previous Palliative Medicine Note       Documentation of GOC: 	          REFERRALS	        Palliative Med        Unit SW/Case Mgmt              Speech/Swallow       Patient/Family Support       Massage Therapy       Music Therapy       Hospice       Nutrition       Ethics       PT/OT SADIA LUTZ   MRN-[id number]     (1927):     HPI:  89M with Parkinson's (on Sinemet), Afib (Toprol XL and Coumadin), HTN, BPH and ETOH abuse who was BIBEMS after being found down at home. Patient was found face down on the floor by his cleaning lady today w/ periorbital swelling and bruising to bilateral cheeks. As per wife patient was last seen well 5 days PTA but she spoke to him on the phone 2 days PTA. Patient was altered on presentation an unable to provide history.     At [facility] T 96.5,  in Afib, /91, RR18 [id number]%. Labs showed BUN/Cr significantly elevated from baseline. Minor trop elevation. UA positive for LE, WBC, Nitrite, and Bacteria. Patient was given Ceftriaxone, 0.5mg Ativan due to tremor, and 2LNS. He was started on bicarb drip and transferred to [facility].     On arrival to [facility] patient minimally responsive. He affirmed to chills, denied fever. Affirmed to nausea. Denied vomiting, abd pain, changes in stool, blood in stool. He denies changes in urinary habits. (21 Aug 2018 16:56)    Additional History per patients wife:  Patient has history of alcohol use, usually the 1.5 oz bottles of vodka, but  found a larger bottle when she cam to the apartment on Tuesday.  Patient is - but wife went to [place] over the weekend to assist in the care of a sick family member.  Although she urged patient to join her in [place], he chose to stay home.  Wife last spoke to him on .    Patient with known Parkinsons, on treatment.  Walking is generally ok except for pain in his knees.  S/P right knee replacement- but doctors were unwilling to do the left.    Patient in [facility] ED August 3 after a fall and was sent home      PAST MEDICAL & SURGICAL HISTORY:  Parkinson's disease  BPH (benign prostatic hyperplasia)  HLD (hyperlipidemia)  HTN (hypertension)  Atrial fibrillation  No significant past surgical history      FAMILY HISTORY:  not known        ROS:    Unable to obtain due to: patient is unreliable and inconsistent historian, although there has been some improvement since admission                     Dyspnea (Carissa 0-10):    denies                    N/V (Y/N):                N              Secretions (Y/N) :    N            Agitation(Y/N):        N  Pain (Y/N):     both knees, but cannot describe further  -Provocation/Palliation:  -Quality/Quantity:  -Radiating:  -Severity:  -Timing/Frequency:  -Impact on ADLs:    General:  no details available and patient cannot provide information  HEENT:  no details available and patient cannot provide information  Neck:  no details available and patient cannot provide information  CVS: denies CP or SOB  Resp:  Denied  GI:  no details available and patient cannot provide information  :  no details available and patient cannot provide information  Musc:  see details re knees above  Neuro: Parkinsons, level of function not totally clear  Psych:  Denied  Skin:  no details available and patient cannot provide information  Lymph:  no details available and patient cannot provide information    Allergies    aspirin (Unknown)    Intolerances        Opiate Naive (Y/N): yes, med list obtained from patient's pharmacy      Medications:      MEDICATIONS  (STANDING):  BACItracin   Ointment 1 Application(s) Topical daily  carbidopa/levodopa  25/100 1 Tablet(s) Oral three times a day  cefTRIAXone   IVPB 1 Gram(s) IV Intermittent every 24 hours  finasteride 5 milliGRAM(s) Oral daily  LORazepam   Injectable 0.5 milliGRAM(s) IV Push once  metoprolol succinate  milliGRAM(s) Oral daily  sodium chloride 0.9%. 1000 milliLiter(s) (75 mL/Hr) IV Continuous <Continuous>  tamsulosin 0.8 milliGRAM(s) Oral at bedtime    MEDICATIONS  (PRN):  ALSO ON COUMADIN AT HOME.  PRESENTLY BEING HELD    Labs:    CBC:                        11.1   5.6   )-----------( 148      ( 22 Aug 2018 06:59 )             33.7     CMP:        146<H>  |  112<H>  |  77<H>  ----------------------------<  96  3.6   |  17<L>  |  2.33<H>    Ca    8.6      22 Aug 2018 06:59  Mg     2.1         TPro  6.8  /  Alb  3.4  /  TBili  0.6  /  DBili  x   /  AST  62<H>  /  ALT  29  /  AlkPhos  65      PT/INR - ( 22 Aug 2018 06:59 )   PT: 35.7 sec;   INR: 3.14          PTT - ( 22 Aug 2018 06:59 )  PTT:33.1 sec  Urinalysis Basic - ( 21 Aug 2018 10:44 )  Creatine Kinase, Serum: 1928 U/L (18 @ 15:23)    Color: Yellow / Appearance: Clear / S.025 / pH: x  Gluc: x / Ketone: NEGATIVE  / Bili: NEGATIVE / Urobili: 0.2 E.U./dL   Blood: x / Protein: 100 mg/dL / Nitrite: POSITIVE   Leuk Esterase: Small / RBC: Many /HPF / WBC > 10 /HPF   Sq Epi: x / Non Sq Epi: x / Bacteria: Many /HPF    Lactate, Blood: 2.8: 18 10:52-18 10:53      SACHINSADIA CHANDRA   1927  MRN [id number]  TYPE:(C=Critical, N=Notification, A=Abnormal) N  TESTS: LACTATE=2.8  DATE/TIME CALLED: 18 10:52-18 10:53  CALLED TO: RN. JILL MYERSON  READ BACK (2 Patient Identifiers)(Y/N): Y  READ BACK VALUES (Y/N): Y  CALLED BY:HLP mmoL/L (18 @ 10:31)    Toxic Screen negative on admission      Imaging:  Reviewed   < from: CT Head No Cont (18 @ 11:24) >  IMPRESSION:  1. No CT evidence of acute intracranial hemorrhage.  2. 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  3. No significant change compared to the prior study of 8/3/2018.    < end of copied text >      < from: CT Abdomen and Pelvis No Cont (18 @ 11:58) >  IMPRESSION:    No acute thoracic or abdominopelvic pathology.    < end of copied text >      < from: CT Cervical Spine No Cont (18 @ 11:59) >  mpression: Mild degenerative changes of the cervical spine with no   evidence of acute fractures.    < end of copied text >        PEx:  T(C): 36.6 (18 @ 13:00), Max: 37.7 (18 @ 23:11)  HR: 134 (18 @ 08:49) (114 - 134)  BP: 131/80 (18 @ 08:49) (96/51 - 153/66)  RR: 16 (18 @ 05:25) (16 - 17)  SpO2: 99% (18 @ 08:49) (99% - 100%)  Wt(kg): --  Daily     Daily   CAPILLARY BLOOD GLUCOSE        I&O's Summary    21 Aug 2018 07:  -  22 Aug 2018 07:00  --------------------------------------------------------  IN: 2050 mL / OUT: 2900 mL / NET: -850 mL    22 Aug 2018 07:  -  22 Aug 2018 14:29  --------------------------------------------------------  IN: 0 mL / OUT: 1200 mL / NET: -1200 mL        General: elderly plethoric gentleman in NAD, sometimes difficult to understand ? mild confusion  HEENT:  non-icteric sclera, multiple bruises abrasion on chin and both cheeks  Neck: no adenopathy  CVS: irreg tachycardia, distant, no murmur heard  Resp: clear  GI:  protuberant, normal BS, non-tender  :  normal  Musc:   equal strength, no significant wasting  Neuro: non focal, oriented to person place, but not accurate to time.  stories somewhat disjointed  Psych:   confused  Skin: diffuse actinic keratoses and evidence of sun damage, multiple abrasions including both knees  Lymph:no adenopathy  Preadmit Karnofsky:  	                    presumed 60-70%           Current Karnofsky:     40%  Cachexia (Y/N): no  BMI:    Advanced Directives:     Full Code      Decision maker: Patient presently not able to make complex medical decisions  Legal surrogate: wife Angle Lutz    Social History: .  Wife is about 20 years younger.  He was a , she was an ad  for the [employer], no Children.  He is [place]  Patient and wife are residents of [facility]    GOALS OF CARE DISCUSSION             Documentation of GOC: to recover from this event, to go to rehab.  Wife presently overwhelmed regarding family illness	          REFERRALS	        Palliative Med               PT/OT

## 2018-08-22 NOTE — PROGRESS NOTE ADULT - ASSESSMENT
89 y/o M with hx of parkinsons, HTN, BPH, alch abuse, afib, found down at home for unknown period of time admitted for toxic metabolic encephalopathy and acute renal failure and found to have UTI.

## 2018-08-22 NOTE — CONSULT NOTE ADULT - PROBLEM SELECTOR RECOMMENDATION 3
Patient with significant confusion on admission.  Is slowly improving with correction of renal failure and treatment of infection.  Unclear exactly what his baseline is.  Will certainly need rehab

## 2018-08-22 NOTE — PROGRESS NOTE ADULT - PROBLEM SELECTOR PLAN 9
-Monitor CIWA  -S/p 2 0.5 mg Ativan doses, most likely confounded by Parkinson's disease -Monitor CIWA  -S/p 2 0.5 mg Ativan doses, most likely confounded by Parkinson's disease    #Goals of care discussion  - palliative on board, f/u recs

## 2018-08-22 NOTE — CONSULT NOTE ADULT - PROBLEM SELECTOR RECOMMENDATION 9
Patient presents with UTI, dehydration and low BP.  Has responded to antibiotics and fluids with some improvement in renal function.  Continue treatment per primary team

## 2018-08-22 NOTE — CONSULT NOTE ADULT - PROBLEM SELECTOR RECOMMENDATION 5
Patient's baseline status not well known.  Wife unable to come to the hospital at this time for meeting because she is caring for a very sick relative in order to give his family a break. She is really unable to focus on discussion of patients advance directives.  Suggest further contact with his primary MD.  Also when he gets to rehab.  He is showing improvement, so for now this is a reasonable plan  Support provided to patient and family. Patient to have access to supportive services during rest of hospital stay as the patient/family deemed necessary ie. Chaplaincy, Massage therapy, Music therapy, Patient and family supportive services, Palliative SW, etc.  As discussed during the palliative IDT meeting and as identified during the patients PSSA screening the patient would benefit from: working with [employer] SW

## 2018-08-22 NOTE — PROGRESS NOTE ADULT - PROBLEM SELECTOR PLAN 5
-Continue Ceftriaxone 1g q24h for total 7days treatment -Continue Ceftriaxone 1g q24h for total 7days treatment (last day 8/27)

## 2018-08-22 NOTE — PROGRESS NOTE ADULT - PROBLEM SELECTOR PLAN 10
F: 150cc NS/h  E: Replete PRN  N: NPO until Dysphagia screen  P: HSQ   C: FULL CODE - pending discussion of GOC with family  D: Admit to [facility] pending improvement     1) PCP Contacted on Admission: (Y/N) --> Name & Phone #: Dr. Reji Paulino  2) Date of Contact with PCP:  3) PCP Contacted at Discharge: (Y/N, N/A)  4) Summary of Handoff Given to PCP:   5) Post-Discharge Appointment Date and Location: F: 75cc NS/h  E: Replete PRN  N: NPO until Dysphagia screen  P: Holding HSQ in setting of increased INR  C: FULL CODE - pending discussion of GOC with family  D: Admit to [facility] pending improvement     1) PCP Contacted on Admission: (Y/N) --> Name & Phone #: Dr. Reji Paulino  2) Date of Contact with PCP:  3) PCP Contacted at Discharge: (Y/N, N/A)  4) Summary of Handoff Given to PCP:   5) Post-Discharge Appointment Date and Location:

## 2018-08-22 NOTE — PROGRESS NOTE ADULT - PROBLEM SELECTOR PLAN 3
Plan as above, most likely in setting of dehydration, patient with underlying CKD Stage 3.   -Downtrending BUN/Cr in setting of IVF  -Strict I/O  -Monitor BMP

## 2018-08-22 NOTE — PROGRESS NOTE ADULT - SUBJECTIVE AND OBJECTIVE BOX
INTERVAL HPI/OVERNIGHT EVENTS:  Patient was seen and examined at bedside. As per nurse and patient, no o/n events, patient resting comfortably. No complaints at this time. Patient denies: fever, chills, dizziness, weakness, HA, Changes in vision, CP, palpitations, SOB, cough, N/V/D/C, dysuria, changes in bowel movements, LE edema. ROS otherwise negative.    VITAL SIGNS:  T(F): 98.2 (18 @ 06:01)  HR: 126 (18 @ 05:25)  BP: 124/78 (18 @ 05:25)  RR: 16 (18 @ 05:25)  SpO2: 100% (18 @ 05:25)  Wt(kg): --    PHYSICAL EXAM:    Constitutional: WDWN, NAD  HEENT: PERRL, EOMI, sclera non-icteric, neck supple, trachea midline, no masses, no JVD, MMM, good dentition  Respiratory: CTA b/l, good air entry b/l, no wheezing, no rhonchi, no rales, without accessory muscle use and no intercostal retractions  Cardiovascular: RRR, normal S1S2, no M/R/G  Gastrointestinal: soft, NTND, no masses palpable, BS normal  Extremities: Warm, well perfused, pulses equal bilateral upper and lower extremities, no edema, no clubbing. Capillary refill <2 sec  Neurological: AAOx3, CN Grossly intact  Skin: Normal temperature, warm, dry    MEDICATIONS  (STANDING):  carbidopa/levodopa  25/100 1 Tablet(s) Oral three times a day  cefTRIAXone   IVPB 1 Gram(s) IV Intermittent every 24 hours  dextrose 5% 1000 milliLiter(s) (100 mL/Hr) IV Continuous <Continuous>  digoxin  Injectable 0.25 milliGRAM(s) IV Push once  digoxin  Injectable 0.25 milliGRAM(s) IV Push once  finasteride 5 milliGRAM(s) Oral daily  heparin  Injectable 5000 Unit(s) SubCutaneous every 8 hours  LORazepam   Injectable 0.5 milliGRAM(s) IV Push once  metoprolol succinate  milliGRAM(s) Oral daily  sodium chloride 0.9%. 1000 milliLiter(s) (150 mL/Hr) IV Continuous <Continuous>  tamsulosin 0.8 milliGRAM(s) Oral at bedtime    MEDICATIONS  (PRN):      Allergies    aspirin (Unknown)    Intolerances        LABS:                        12.1   9.0   )-----------( 170      ( 21 Aug 2018 15:23 )             36.0         141  |  106  |  94<H>  ----------------------------<  95  4.0   |  17<L>  |  3.16<H>    Ca    8.5      21 Aug 2018 15:23  Mg     2.7         TPro  6.8  /  Alb  3.4  /  TBili  0.6  /  DBili  x   /  AST  62<H>  /  ALT  29  /  AlkPhos  65      PT/INR - ( 21 Aug 2018 15:23 )   PT: 36.5 sec;   INR: 3.21          PTT - ( 21 Aug 2018 15:23 )  PTT:36.0 sec  Urinalysis Basic - ( 21 Aug 2018 10:44 )    Color: Yellow / Appearance: Clear / S.025 / pH: x  Gluc: x / Ketone: NEGATIVE  / Bili: NEGATIVE / Urobili: 0.2 E.U./dL   Blood: x / Protein: 100 mg/dL / Nitrite: POSITIVE   Leuk Esterase: Small / RBC: Many /HPF / WBC > 10 /HPF   Sq Epi: x / Non Sq Epi: x / Bacteria: Many /HPF        RADIOLOGY & ADDITIONAL TESTS:  Reviewed INTERVAL HPI/OVERNIGHT EVENTS:  Patient was seen and examined at bedside. Overnight, additional lopressor 5mg IVP x3 was given for elevated HR 130s-150s. CXR showed worsening vascular congestion. Banana bag was stopped and NS was decreased to 75ccs/hr. Bedside echo was performed to monitor LV function but wwas difficult to assess. Digoxin loading was begun with 0.5mg given at 1:45am. No complaints at this time.     VITAL SIGNS:  T(F): 98.2 (18 @ 06:01)  HR: 126 (18 @ 05:25)  BP: 124/78 (18 @ 05:25)  RR: 16 (18 @ 05:25)  SpO2: 100% (18 @ 05:25)  Wt(kg): --    PHYSICAL EXAM:    Constitutional: WDWN, NAD  HEENT: PERRL, EOMI, sclera non-icteric, neck supple, trachea midline, no masses, no JVD, MMM, good dentition  Respiratory: Difficult to assess CTA b/l, good air entry b/l, no wheezing, no rhonchi, no rales, without accessory muscle use and no intercostal retractions  Cardiovascular: RRR, normal S1S2, no M/R/G  Gastrointestinal: soft, NTND, no masses palpable, BS normal  Extremities: Warm, well perfused, pulses equal bilateral upper and lower extremities, no edema, no clubbing. Capillary refill <2 sec  Neurological: AAOx1 to person only, CN Grossly intact  Skin: Normal temperature, warm, dry    MEDICATIONS  (STANDING):  carbidopa/levodopa  25/100 1 Tablet(s) Oral three times a day  cefTRIAXone   IVPB 1 Gram(s) IV Intermittent every 24 hours  dextrose 5% 1000 milliLiter(s) (100 mL/Hr) IV Continuous <Continuous>  digoxin  Injectable 0.25 milliGRAM(s) IV Push once  digoxin  Injectable 0.25 milliGRAM(s) IV Push once  finasteride 5 milliGRAM(s) Oral daily  heparin  Injectable 5000 Unit(s) SubCutaneous every 8 hours  LORazepam   Injectable 0.5 milliGRAM(s) IV Push once  metoprolol succinate  milliGRAM(s) Oral daily  sodium chloride 0.9%. 1000 milliLiter(s) (150 mL/Hr) IV Continuous <Continuous>  tamsulosin 0.8 milliGRAM(s) Oral at bedtime    MEDICATIONS  (PRN):      Allergies    aspirin (Unknown)    Intolerances        LABS:                        12.1   9.0   )-----------( 170      ( 21 Aug 2018 15:23 )             36.0         141  |  106  |  94<H>  ----------------------------<  95  4.0   |  17<L>  |  3.16<H>    Ca    8.5      21 Aug 2018 15:23  Mg     2.7         TPro  6.8  /  Alb  3.4  /  TBili  0.6  /  DBili  x   /  AST  62<H>  /  ALT  29  /  AlkPhos  65      PT/INR - ( 21 Aug 2018 15:23 )   PT: 36.5 sec;   INR: 3.21          PTT - ( 21 Aug 2018 15:23 )  PTT:36.0 sec  Urinalysis Basic - ( 21 Aug 2018 10:44 )    Color: Yellow / Appearance: Clear / S.025 / pH: x  Gluc: x / Ketone: NEGATIVE  / Bili: NEGATIVE / Urobili: 0.2 E.U./dL   Blood: x / Protein: 100 mg/dL / Nitrite: POSITIVE   Leuk Esterase: Small / RBC: Many /HPF / WBC > 10 /HPF   Sq Epi: x / Non Sq Epi: x / Bacteria: Many /HPF        RADIOLOGY & ADDITIONAL TESTS:  Reviewed

## 2018-08-22 NOTE — CONSULT NOTE ADULT - PROBLEM SELECTOR RECOMMENDATION 2
Patient on ground for at least 24 hours.  Multiple bruises, abrasions secondary to fall.  Seems to be responding to fluids

## 2018-08-22 NOTE — PROGRESS NOTE ADULT - PROBLEM SELECTOR PLAN 2
Most likely in setting of ARF + Sepsis also unclear when last dose of Sinemet was and could be suffering from withdrawal from dopaminergic substances  -Continue IV hydration  -Restart Sinemet 25/100  -Trend BMP/lytes Most likely in setting of ARF + Sepsis also unclear when last dose of Sinemet was and could be suffering from withdrawal from dopaminergic substances  -Continue IV hydration  -c/w Sinemet 25/100  -Trend BMP/lytes

## 2018-08-23 LAB
ANION GAP SERPL CALC-SCNC: 18 MMOL/L — HIGH (ref 5–17)
ANION GAP SERPL CALC-SCNC: 20 MMOL/L — HIGH (ref 5–17)
ANION GAP SERPL CALC-SCNC: 21 MMOL/L — HIGH (ref 5–17)
APTT BLD: 35.6 SEC — SIGNIFICANT CHANGE UP (ref 27.5–37.4)
BUN SERPL-MCNC: 47 MG/DL — HIGH (ref 7–23)
BUN SERPL-MCNC: 48 MG/DL — HIGH (ref 7–23)
BUN SERPL-MCNC: 58 MG/DL — HIGH (ref 7–23)
CALCIUM SERPL-MCNC: 8.7 MG/DL — SIGNIFICANT CHANGE UP (ref 8.4–10.5)
CALCIUM SERPL-MCNC: 9 MG/DL — SIGNIFICANT CHANGE UP (ref 8.4–10.5)
CALCIUM SERPL-MCNC: 9 MG/DL — SIGNIFICANT CHANGE UP (ref 8.4–10.5)
CHLORIDE SERPL-SCNC: 111 MMOL/L — HIGH (ref 96–108)
CHLORIDE SERPL-SCNC: 114 MMOL/L — HIGH (ref 96–108)
CHLORIDE SERPL-SCNC: 114 MMOL/L — HIGH (ref 96–108)
CK SERPL-CCNC: 752 U/L — HIGH (ref 30–200)
CO2 SERPL-SCNC: 18 MMOL/L — LOW (ref 22–31)
CO2 SERPL-SCNC: 18 MMOL/L — LOW (ref 22–31)
CO2 SERPL-SCNC: 19 MMOL/L — LOW (ref 22–31)
CREAT SERPL-MCNC: 1.46 MG/DL — HIGH (ref 0.5–1.3)
CREAT SERPL-MCNC: 1.55 MG/DL — HIGH (ref 0.5–1.3)
CREAT SERPL-MCNC: 1.65 MG/DL — HIGH (ref 0.5–1.3)
CULTURE RESULTS: NO GROWTH — SIGNIFICANT CHANGE UP
GLUCOSE SERPL-MCNC: 80 MG/DL — SIGNIFICANT CHANGE UP (ref 70–99)
GLUCOSE SERPL-MCNC: 97 MG/DL — SIGNIFICANT CHANGE UP (ref 70–99)
GLUCOSE SERPL-MCNC: 97 MG/DL — SIGNIFICANT CHANGE UP (ref 70–99)
HCT VFR BLD CALC: 38.9 % — LOW (ref 39–50)
HGB BLD-MCNC: 12.3 G/DL — LOW (ref 13–17)
INR BLD: 2.43 — HIGH (ref 0.88–1.16)
MAGNESIUM SERPL-MCNC: 2 MG/DL — SIGNIFICANT CHANGE UP (ref 1.6–2.6)
MCHC RBC-ENTMCNC: 31.1 PG — SIGNIFICANT CHANGE UP (ref 27–34)
MCHC RBC-ENTMCNC: 31.6 G/DL — LOW (ref 32–36)
MCV RBC AUTO: 98.5 FL — SIGNIFICANT CHANGE UP (ref 80–100)
PHOSPHATE SERPL-MCNC: 2.6 MG/DL — SIGNIFICANT CHANGE UP (ref 2.5–4.5)
PLATELET # BLD AUTO: 161 K/UL — SIGNIFICANT CHANGE UP (ref 150–400)
POTASSIUM SERPL-MCNC: 3.5 MMOL/L — SIGNIFICANT CHANGE UP (ref 3.5–5.3)
POTASSIUM SERPL-MCNC: 3.8 MMOL/L — SIGNIFICANT CHANGE UP (ref 3.5–5.3)
POTASSIUM SERPL-MCNC: 3.9 MMOL/L — SIGNIFICANT CHANGE UP (ref 3.5–5.3)
POTASSIUM SERPL-SCNC: 3.5 MMOL/L — SIGNIFICANT CHANGE UP (ref 3.5–5.3)
POTASSIUM SERPL-SCNC: 3.8 MMOL/L — SIGNIFICANT CHANGE UP (ref 3.5–5.3)
POTASSIUM SERPL-SCNC: 3.9 MMOL/L — SIGNIFICANT CHANGE UP (ref 3.5–5.3)
PROTHROM AB SERPL-ACNC: 27.5 SEC — HIGH (ref 9.8–12.7)
RBC # BLD: 3.95 M/UL — LOW (ref 4.2–5.8)
RBC # FLD: 14 % — SIGNIFICANT CHANGE UP (ref 10.3–16.9)
SODIUM SERPL-SCNC: 149 MMOL/L — HIGH (ref 135–145)
SODIUM SERPL-SCNC: 151 MMOL/L — HIGH (ref 135–145)
SODIUM SERPL-SCNC: 153 MMOL/L — HIGH (ref 135–145)
SPECIMEN SOURCE: SIGNIFICANT CHANGE UP
WBC # BLD: 7.1 K/UL — SIGNIFICANT CHANGE UP (ref 3.8–10.5)
WBC # FLD AUTO: 7.1 K/UL — SIGNIFICANT CHANGE UP (ref 3.8–10.5)

## 2018-08-23 PROCEDURE — 99233 SBSQ HOSP IP/OBS HIGH 50: CPT | Mod: GC

## 2018-08-23 PROCEDURE — 71045 X-RAY EXAM CHEST 1 VIEW: CPT | Mod: 26

## 2018-08-23 PROCEDURE — 99233 SBSQ HOSP IP/OBS HIGH 50: CPT

## 2018-08-23 PROCEDURE — 71045 X-RAY EXAM CHEST 1 VIEW: CPT | Mod: 26,77

## 2018-08-23 RX ORDER — SODIUM CHLORIDE 9 MG/ML
1000 INJECTION, SOLUTION INTRAVENOUS
Qty: 0 | Refills: 0 | Status: DISCONTINUED | OUTPATIENT
Start: 2018-08-23 | End: 2018-08-24

## 2018-08-23 RX ORDER — METOPROLOL TARTRATE 50 MG
200 TABLET ORAL ONCE
Qty: 0 | Refills: 0 | Status: DISCONTINUED | OUTPATIENT
Start: 2018-08-23 | End: 2018-08-23

## 2018-08-23 RX ORDER — METOPROLOL TARTRATE 50 MG
5 TABLET ORAL ONCE
Qty: 0 | Refills: 0 | Status: COMPLETED | OUTPATIENT
Start: 2018-08-23 | End: 2018-08-23

## 2018-08-23 RX ORDER — METOPROLOL TARTRATE 50 MG
100 TABLET ORAL ONCE
Qty: 0 | Refills: 0 | Status: COMPLETED | OUTPATIENT
Start: 2018-08-23 | End: 2018-08-23

## 2018-08-23 RX ORDER — SODIUM CHLORIDE 9 MG/ML
1000 INJECTION, SOLUTION INTRAVENOUS
Qty: 0 | Refills: 0 | Status: DISCONTINUED | OUTPATIENT
Start: 2018-08-23 | End: 2018-08-23

## 2018-08-23 RX ADMIN — Medication 100 MILLIGRAM(S): at 21:31

## 2018-08-23 RX ADMIN — CARBIDOPA AND LEVODOPA 1 TABLET(S): 25; 100 TABLET ORAL at 22:12

## 2018-08-23 RX ADMIN — SODIUM CHLORIDE 75 MILLILITER(S): 9 INJECTION, SOLUTION INTRAVENOUS at 09:53

## 2018-08-23 RX ADMIN — SODIUM CHLORIDE 75 MILLILITER(S): 9 INJECTION, SOLUTION INTRAVENOUS at 19:21

## 2018-08-23 RX ADMIN — CEFTRIAXONE 100 GRAM(S): 500 INJECTION, POWDER, FOR SOLUTION INTRAMUSCULAR; INTRAVENOUS at 12:27

## 2018-08-23 RX ADMIN — Medication 1 APPLICATION(S): at 12:24

## 2018-08-23 RX ADMIN — Medication 5 MILLIGRAM(S): at 16:11

## 2018-08-23 NOTE — PROGRESS NOTE ADULT - SUBJECTIVE AND OBJECTIVE BOX
INTERVAL HPI/OVERNIGHT EVENTS:  Patient was seen and examined at bedside. As per nurse and patient, no o/n events, patient resting comfortably. No complaints at this time. Patient denies: fever, chills, dizziness, weakness, HA, Changes in vision, CP, palpitations, SOB, cough, N/V/D/C, dysuria, changes in bowel movements, LE edema. ROS otherwise negative.    VITAL SIGNS:  T(F): 99.6 (18 @ 02:18)  HR: 110 (18 @ 05:03)  BP: 140/93 (18 @ 05:03)  RR: 16 (18 @ 05:03)  SpO2: 100% (18 @ 05:03)  Wt(kg): --    PHYSICAL EXAM:    Constitutional: WDWN, NAD  HEENT: PERRL, EOMI, sclera non-icteric, neck supple, trachea midline, no masses, no JVD, MMM, good dentition  Respiratory: CTA b/l, good air entry b/l, no wheezing, no rhonchi, no rales, without accessory muscle use and no intercostal retractions  Cardiovascular: RRR, normal S1S2, no M/R/G  Gastrointestinal: soft, NTND, no masses palpable, BS normal  Extremities: Warm, well perfused, pulses equal bilateral upper and lower extremities, no edema, no clubbing. Capillary refill <2 sec  Neurological: AAOx3, CN Grossly intact  Skin: Normal temperature, warm, dry    MEDICATIONS  (STANDING):  BACItracin   Ointment 1 Application(s) Topical daily  carbidopa/levodopa  25/100 1 Tablet(s) Oral three times a day  cefTRIAXone   IVPB 1 Gram(s) IV Intermittent every 24 hours  finasteride 5 milliGRAM(s) Oral daily  lactated ringers. 1000 milliLiter(s) (75 mL/Hr) IV Continuous <Continuous>  LORazepam   Injectable 0.5 milliGRAM(s) IV Push once  metoprolol succinate  milliGRAM(s) Oral daily  tamsulosin 0.8 milliGRAM(s) Oral at bedtime    MEDICATIONS  (PRN):      Allergies    aspirin (Unknown)    Intolerances        LABS:                        11.1   5.6   )-----------( 148      ( 22 Aug 2018 06:59 )             33.7     08-    146<H>  |  112<H>  |  77<H>  ----------------------------<  96  3.6   |  17<L>  |  2.33<H>    Ca    8.6      22 Aug 2018 06:59  Mg     2.1         TPro  6.8  /  Alb  3.4  /  TBili  0.6  /  DBili  x   /  AST  62<H>  /  ALT  29  /  AlkPhos  65  08    PT/INR - ( 22 Aug 2018 06:59 )   PT: 35.7 sec;   INR: 3.14          PTT - ( 22 Aug 2018 06:59 )  PTT:33.1 sec  Urinalysis Basic - ( 21 Aug 2018 10:44 )    Color: Yellow / Appearance: Clear / S.025 / pH: x  Gluc: x / Ketone: NEGATIVE  / Bili: NEGATIVE / Urobili: 0.2 E.U./dL   Blood: x / Protein: 100 mg/dL / Nitrite: POSITIVE   Leuk Esterase: Small / RBC: Many /HPF / WBC > 10 /HPF   Sq Epi: x / Non Sq Epi: x / Bacteria: Many /HPF        RADIOLOGY & ADDITIONAL TESTS:  Reviewed INTERVAL HPI/OVERNIGHT EVENTS:  Patient was seen and examined at bedside. O/N, pt continued to be tachycardic and was given lopressor 5mg IVP one time. Fluids were switched to LR. No complaints at this time.     VITAL SIGNS:  T(F): 99.6 (18 @ 02:18)  HR: 110 (18 @ 05:03)  BP: 140/93 (18 @ 05:03)  RR: 16 (18 @ 05:03)  SpO2: 100% (18 @ 05:03)  Wt(kg): --    PHYSICAL EXAM:    Constitutional: Laying in bed in NAD. Pt states that he is about to go out for breakfast.  HEENT: PERRL, EOMI, sclera non-icteric, neck supple, trachea midline, no masses, no JVD, MMM, good dentition  Respiratory: Difficult to assess 2/2 poor inspiratory effort, but no wheezing, no rhonchi, no rales, without accessory muscle use and no intercostal retractions  Cardiovascular: Tachycardic, normal S1S2, no M/R/G  Gastrointestinal: soft, NTND, no masses palpable, BS normal  Extremities: Warm, well perfused, pulses equal bilateral upper and lower extremities, no edema, no clubbing. Capillary refill <2 sec  Neurological: AAOx1 to person only, CN Grossly intact  Skin: Normal temperature, warm, dry; bruising on cheeks and nose as well as L knee    MEDICATIONS  (STANDING):  BACItracin   Ointment 1 Application(s) Topical daily  carbidopa/levodopa  25/100 1 Tablet(s) Oral three times a day  cefTRIAXone   IVPB 1 Gram(s) IV Intermittent every 24 hours  finasteride 5 milliGRAM(s) Oral daily  lactated ringers. 1000 milliLiter(s) (75 mL/Hr) IV Continuous <Continuous>  LORazepam   Injectable 0.5 milliGRAM(s) IV Push once  metoprolol succinate  milliGRAM(s) Oral daily  tamsulosin 0.8 milliGRAM(s) Oral at bedtime    MEDICATIONS  (PRN):      Allergies    aspirin (Unknown)    Intolerances        LABS:                        11.1   5.6   )-----------( 148      ( 22 Aug 2018 06:59 )             33.7     08-22    146<H>  |  112<H>  |  77<H>  ----------------------------<  96  3.6   |  17<L>  |  2.33<H>    Ca    8.6      22 Aug 2018 06:59  Mg     2.1         TPro  6.8  /  Alb  3.4  /  TBili  0.6  /  DBili  x   /  AST  62<H>  /  ALT  29  /  AlkPhos  65  08    PT/INR - ( 22 Aug 2018 06:59 )   PT: 35.7 sec;   INR: 3.14          PTT - ( 22 Aug 2018 06:59 )  PTT:33.1 sec  Urinalysis Basic - ( 21 Aug 2018 10:44 )    Color: Yellow / Appearance: Clear / S.025 / pH: x  Gluc: x / Ketone: NEGATIVE  / Bili: NEGATIVE / Urobili: 0.2 E.U./dL   Blood: x / Protein: 100 mg/dL / Nitrite: POSITIVE   Leuk Esterase: Small / RBC: Many /HPF / WBC > 10 /HPF   Sq Epi: x / Non Sq Epi: x / Bacteria: Many /HPF        RADIOLOGY & ADDITIONAL TESTS:  Reviewed

## 2018-08-23 NOTE — PROGRESS NOTE ADULT - PROBLEM SELECTOR PLAN 5
Discussed at length with patients wife-  she is greatly overwhelmed by family circumstance.  Explanation of medical situation.  Support provided    Support provided to patient and family. Patient to have access to supportive services during rest of hospital stay as the patient/family deemed necessary ie. Chaplaincy, Massage therapy, Music therapy, Patient and family supportive services, Palliative SW, etc.  Pall  continues to work with wife

## 2018-08-23 NOTE — PROGRESS NOTE ADULT - PROBLEM SELECTOR PLAN 9
-Monitor CIWA  -S/p 2 0.5 mg Ativan doses, most likely confounded by Parkinson's disease    #Goals of care discussion  - palliative on board, f/u recs -CIWA consistently negative; last drink >72 hours ago  -S/p 2 0.5 mg Ativan doses, most likely confounded by Parkinson's disease    #Goals of care discussion  - palliative on board, f/u recs

## 2018-08-23 NOTE — SWALLOW BEDSIDE ASSESSMENT ADULT - ADDITIONAL RECOMMENDATIONS
1) Given prolonged period without nutrition per MD, RN reports of lethargy t/o the day, and current dysphagia, consider a short-term alternative means of nutrition, hydration and medication.   2) This SVC will f/u to re-assess for Po candidacy. 1) Given multiple days without nutrition per MD, RN reports of lethargy t/o the day, and current dysphagia, consider a short-term alternative means of nutrition, hydration and medication.   2) D/w MD. Considering that pt requires his Parkinson's medications and symptoms will likely worsen without them, although pt is at risk of aspiration, if pt cannot tolerate the NGT, it is up to the medical team to weigh risks/benefits of PO meds.   3) This SVC will f/u to re-assess for PO candidacy.

## 2018-08-23 NOTE — SWALLOW BEDSIDE ASSESSMENT ADULT - PHARYNGEAL PHASE
Suspect inconsistent delay; suspect severe delay at times. Inconsistent cough. Suspect inconsistent delay; suspect severe delay at times. Cued swallow unable to be elicited.  Inconsistent cough./Wet vocal quality post oral intake Suspect inconsistent delay; suspect severe delay at times. Cued swallow unable to be elicited.  Inconsistent cough.

## 2018-08-23 NOTE — PROGRESS NOTE ADULT - PROBLEM SELECTOR PLAN 6
-Sinement 25/100 TID Pt on Sinemet 25/100 TID PO, however failed speech and swallow and unable to place NGT at this time  - will retry NGT placement  - monitor for s/s of withdrawal from Sinemet

## 2018-08-23 NOTE — PROGRESS NOTE ADULT - PROBLEM SELECTOR PLAN 10
F: 75cc LR/h  E: Replete PRN  N: NPO until Dysphagia screen  P: Holding HSQ in setting of increased INR  C: FULL CODE - pending discussion of GOC with family  D: Admit to [facility] pending improvement     1) PCP Contacted on Admission: (Y/N) --> Name & Phone #: Dr. Reji Paulino  2) Date of Contact with PCP:  3) PCP Contacted at Discharge: (Y/N, N/A)  4) Summary of Handoff Given to PCP:   5) Post-Discharge Appointment Date and Location: F: 75cc D5 1/3 NS  E: Replete PRN  N: Pt failed speech/swallow, currently NPO   P: Holding HSQ in setting of increased INR  C: FULL CODE - pending discussion of GOC with family  D: Admit to [facility] pending improvement     1) PCP Contacted on Admission: (Y/N) --> Name & Phone #: Dr. Reji Paulino  2) Date of Contact with PCP:  3) PCP Contacted at Discharge: (Y/N, N/A)  4) Summary of Handoff Given to PCP:   5) Post-Discharge Appointment Date and Location:

## 2018-08-23 NOTE — PROGRESS NOTE ADULT - PROBLEM SELECTOR PLAN 4
Has not received full dose of Sinemet for at least two - three days.  Hope is to get him OOB and cleared to swallow his pills

## 2018-08-23 NOTE — PROGRESS NOTE ADULT - ATTENDING COMMENTS
Patient seen and examined with house-staff during bedside rounds.  Resident note read, including vitals, physical findings, laboratory data, and radiological reports.   Revisions included below.  Direct personal management at bed side and extensive interpretation of the data.  Plan was outlined and discussed in details with the housestaff.  Decision making of high complexity  Action taken for acute disease activity to reflect the level of care provided:  - medication reconciliation  - review laboratory data  Her clinical condition is stable. Mental status watch and wait. Patient fell dysphagia evaluation. I inserted NG tube. Chest x-ray confirmed the position of the NG tube in the fundus. The NG  tube is out 65 cm at the tip of the nose. Restart Parkinson and atrial fibrillation meds. No anticoagulation. Her renal function improved. Avoid fluid overload. Continue antibiotic. Follow culture which are negative to date

## 2018-08-23 NOTE — SWALLOW BEDSIDE ASSESSMENT ADULT - SLP GENERAL OBSERVATIONS
MD present at bedside for the entirety of this exam. MD present at bedside for the entirety of this exam. Pt was received sleeping but he woke given verbal/tactile stimulation.

## 2018-08-23 NOTE — PROGRESS NOTE ADULT - ASSESSMENT
89 y/o M with hx of parkinsons, HTN, BPH, alch abuse, afib, found down at home for unknown period of time admitted for toxic metabolic encephalopathy and acute renal failure and found to have UTI.

## 2018-08-23 NOTE — PROGRESS NOTE ADULT - PROBLEM SELECTOR PLAN 7
-Hold Coumadin in setting of Supra INR  -Continue Toprol-XL 200mg, Afib with RVR response likely in setting of withdrawal of large dose BB -Hold Coumadin in setting of Supra INR  -Continue Toprol-XL 200mg, Afib with RVR response likely in setting of withdrawal of large dose BB  -Pt failed speech and swallow with several tries and failure to place NGT for medication. Will give oral medication if NGT can be placed, however will discuss with pharmacy correct IV dosing to match home dose.

## 2018-08-23 NOTE — PROGRESS NOTE ADULT - SUBJECTIVE AND OBJECTIVE BOX
SADIA LUTZ   [id number]     ([id number]):   CC: confusion  HPI:  89M with Parkinson's (on Sinemet), Afib (Toprol XL and Coumadin), HTN, BPH and ETOH abuse who was BIBEMS after being found down at home. Wife was out of town caring for another sick family member.  He was alone and probably fell 24-48 hour earlier.  On admission was in renal failure and found to have a UTI.  Toxic screen was negative for drugs or alcohol.    Subjective:  Patient remains confused today and not oriented to place or time.  Can't recall the name of his wife   Denies pain or SOB.  has wrist restraints because he has been trying to get OOB        ROS:    Unable to obtain due to: patient is unreliable and inconsistent historian, although there has been some minimal improvement since admission                     Dyspnea (Carissa 0-10):    denies                    N/V (Y/N):                N              Secretions (Y/N) :    N            Agitation(Y/N):        N  Pain (Y/N):     no  -Provocation/Palliation:  -Quality/Quantity:  -Radiating:  -Severity:  -Timing/Frequency:  -Impact on ADLs:    General:  no details available and patient cannot provide information  HEENT:  no details available and patient cannot provide information  Neck:  no details available and patient cannot provide information  CVS: denies CP or SOB  Resp:  Denied  GI:  no details available and patient cannot provide information  :  no details available and patient cannot provide information  Musc:  see details re knees above  Neuro: Parkinsons, level of function not totally clear  Psych:  Denied  Skin:  no details available and patient cannot provide information  Lymph:  no details available and patient cannot provide information    Allergies    aspirin (Unknown)    Intolerances        Opiate Naive (Y/N): yes, med list obtained from patient's pharmacy      Medications:      MEDICATIONS  (STANDING):  BACItracin   Ointment 1 Application(s) Topical daily  carbidopa/levodopa  25/100 1 Tablet(s) Oral three times a day  cefTRIAXone   IVPB 1 Gram(s) IV Intermittent every 24 hours  dextrose 5% + sodium chloride 0.3%. 1000 milliLiter(s) (75 mL/Hr) IV Continuous <Continuous>  finasteride 5 milliGRAM(s) Oral daily  LORazepam   Injectable 0.5 milliGRAM(s) IV Push once  metoprolol succinate  milliGRAM(s) Oral daily  tamsulosin 0.8 milliGRAM(s) Oral at bedtime    MEDICATIONS  (PRN):      ALSO ON COUMADIN AT HOME.  PRESENTLY BEING HELD    Labs:                          12.3   7.1   )-----------( 161      ( 23 Aug 2018 08:32 )             38.9       149<H>  |  111<H>  |  58<H>  ----------------------------<  80  3.8   |  18<L>  |  1.65<H>    Ca    8.7      23 Aug 2018 08:55  Phos  2.6       Mg     2.0                   Toxic Screen negative on admission      Imaging:  Reviewed   < from: CT Head No Cont (18 @ 11:24) >  IMPRESSION:  1. No CT evidence of acute intracranial hemorrhage.  2. 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  3. No significant change compared to the prior study of 8/3/2018.    < end of copied text >      < from: CT Abdomen and Pelvis No Cont (18 @ 11:58) >  IMPRESSION:    No acute thoracic or abdominopelvic pathology.    < end of copied text >      < from: CT Cervical Spine No Cont (18 @ 11:59) >  mpression: Mild degenerative changes of the cervical spine with no   evidence of acute fractures.    < end of copied text >        PEx:  Vital Signs Last 24 Hrs  T(C): 37 (23 Aug 2018 14:09), Max: 37.6 (23 Aug 2018 02:18)  T(F): 98.6 (23 Aug 2018 14:09), Max: 99.6 (23 Aug 2018 02:18)  HR: 96 (23 Aug 2018 12:40) (96 - 126)  BP: 134/90 (23 Aug 2018 12:40) (134/90 - 157/93)  BP(mean): 108 (23 Aug 2018 12:40) (101 - 119)  RR: 17 (23 Aug 2018 12:40) (16 - 22)  SpO2: 100% (23 Aug 2018 12:40) (99% - 100%)        General: elderly plethoric gentleman in NAD, sometimes difficult to understand  confusion but  converses easily  HEENT:  non-icteric sclera, multiple bruises abrasion on chin and both cheeks  Neck: no adenopathy  CVS: irreg tachycardia, distant, no murmur heard  Resp: clear  GI:  protuberant, normal BS, non-tender  :  normal  Musc:   equal strength, no significant wasting  Neuro: non focal, oriented to person place, but not accurate to time.  stories somewhat disjointed  Psych:   confused  Skin: diffuse actinic keratoses and evidence of sun damage, multiple abrasions including both knees  Lymph:no adenopathy  Preadmit Karnofsky:  	                    presumed 60-70%           Current Karnofsky:     40%  Cachexia (Y/N): no  BMI:    Advanced Directives:     Full Code      Decision maker: Patient presently not able to make complex medical decisions  Legal surrogate: wife Angle Lutz    Social History: .  Wife is about 20 years younger.  He was a , she was an ad  for the [employer], no Children.  He is [place]  Patient and wife are residents of [facility]    GOALS OF CARE DISCUSSION             Documentation of GOC: to recover from this event, to go to rehab.  Wife presently overwhelmed regarding family illness Spoke with her today- see below	          REFERRALS	        Palliative Med               PT/OT

## 2018-08-23 NOTE — PROGRESS NOTE ADULT - ASSESSMENT
89 yo with Parkinsons found face down at home following a fall 24-48 hours previously.  Dehydrated hypotensive, renal failure.  Perhaps some role of alcohol although screen was negative.

## 2018-08-24 LAB
ANION GAP SERPL CALC-SCNC: 14 MMOL/L — SIGNIFICANT CHANGE UP (ref 5–17)
ANION GAP SERPL CALC-SCNC: 15 MMOL/L — SIGNIFICANT CHANGE UP (ref 5–17)
ANION GAP SERPL CALC-SCNC: 16 MMOL/L — SIGNIFICANT CHANGE UP (ref 5–17)
APTT BLD: 30.9 SEC — SIGNIFICANT CHANGE UP (ref 27.5–37.4)
BUN SERPL-MCNC: 29 MG/DL — HIGH (ref 7–23)
BUN SERPL-MCNC: 33 MG/DL — HIGH (ref 7–23)
BUN SERPL-MCNC: 38 MG/DL — HIGH (ref 7–23)
CALCIUM SERPL-MCNC: 8.7 MG/DL — SIGNIFICANT CHANGE UP (ref 8.4–10.5)
CALCIUM SERPL-MCNC: 8.8 MG/DL — SIGNIFICANT CHANGE UP (ref 8.4–10.5)
CALCIUM SERPL-MCNC: 8.9 MG/DL — SIGNIFICANT CHANGE UP (ref 8.4–10.5)
CHLORIDE SERPL-SCNC: 110 MMOL/L — HIGH (ref 96–108)
CHLORIDE SERPL-SCNC: 112 MMOL/L — HIGH (ref 96–108)
CHLORIDE SERPL-SCNC: 114 MMOL/L — HIGH (ref 96–108)
CO2 SERPL-SCNC: 20 MMOL/L — LOW (ref 22–31)
CO2 SERPL-SCNC: 20 MMOL/L — LOW (ref 22–31)
CO2 SERPL-SCNC: 21 MMOL/L — LOW (ref 22–31)
CREAT SERPL-MCNC: 1.2 MG/DL — SIGNIFICANT CHANGE UP (ref 0.5–1.3)
CREAT SERPL-MCNC: 1.21 MG/DL — SIGNIFICANT CHANGE UP (ref 0.5–1.3)
CREAT SERPL-MCNC: 1.39 MG/DL — HIGH (ref 0.5–1.3)
GLUCOSE SERPL-MCNC: 107 MG/DL — HIGH (ref 70–99)
GLUCOSE SERPL-MCNC: 111 MG/DL — HIGH (ref 70–99)
GLUCOSE SERPL-MCNC: 121 MG/DL — HIGH (ref 70–99)
HCT VFR BLD CALC: 37 % — LOW (ref 39–50)
HGB BLD-MCNC: 12.1 G/DL — LOW (ref 13–17)
INR BLD: 1.89 — HIGH (ref 0.88–1.16)
MAGNESIUM SERPL-MCNC: 1.9 MG/DL — SIGNIFICANT CHANGE UP (ref 1.6–2.6)
MCHC RBC-ENTMCNC: 31.8 PG — SIGNIFICANT CHANGE UP (ref 27–34)
MCHC RBC-ENTMCNC: 32.7 G/DL — SIGNIFICANT CHANGE UP (ref 32–36)
MCV RBC AUTO: 97.1 FL — SIGNIFICANT CHANGE UP (ref 80–100)
PHOSPHATE SERPL-MCNC: 2.4 MG/DL — LOW (ref 2.5–4.5)
PLATELET # BLD AUTO: 170 K/UL — SIGNIFICANT CHANGE UP (ref 150–400)
POTASSIUM SERPL-MCNC: 3.4 MMOL/L — LOW (ref 3.5–5.3)
POTASSIUM SERPL-MCNC: 3.4 MMOL/L — LOW (ref 3.5–5.3)
POTASSIUM SERPL-MCNC: 4 MMOL/L — SIGNIFICANT CHANGE UP (ref 3.5–5.3)
POTASSIUM SERPL-SCNC: 3.4 MMOL/L — LOW (ref 3.5–5.3)
POTASSIUM SERPL-SCNC: 3.4 MMOL/L — LOW (ref 3.5–5.3)
POTASSIUM SERPL-SCNC: 4 MMOL/L — SIGNIFICANT CHANGE UP (ref 3.5–5.3)
PROTHROM AB SERPL-ACNC: 21.3 SEC — HIGH (ref 9.8–12.7)
RBC # BLD: 3.81 M/UL — LOW (ref 4.2–5.8)
RBC # FLD: 14 % — SIGNIFICANT CHANGE UP (ref 10.3–16.9)
SODIUM SERPL-SCNC: 145 MMOL/L — SIGNIFICANT CHANGE UP (ref 135–145)
SODIUM SERPL-SCNC: 147 MMOL/L — HIGH (ref 135–145)
SODIUM SERPL-SCNC: 150 MMOL/L — HIGH (ref 135–145)
WBC # BLD: 7.1 K/UL — SIGNIFICANT CHANGE UP (ref 3.8–10.5)
WBC # FLD AUTO: 7.1 K/UL — SIGNIFICANT CHANGE UP (ref 3.8–10.5)

## 2018-08-24 PROCEDURE — 99233 SBSQ HOSP IP/OBS HIGH 50: CPT

## 2018-08-24 PROCEDURE — 99233 SBSQ HOSP IP/OBS HIGH 50: CPT | Mod: GC

## 2018-08-24 PROCEDURE — 76775 US EXAM ABDO BACK WALL LIM: CPT | Mod: 26

## 2018-08-24 RX ORDER — AMLODIPINE BESYLATE 2.5 MG/1
5 TABLET ORAL DAILY
Qty: 0 | Refills: 0 | Status: DISCONTINUED | OUTPATIENT
Start: 2018-08-24 | End: 2018-08-28

## 2018-08-24 RX ORDER — DOXAZOSIN MESYLATE 4 MG
1 TABLET ORAL AT BEDTIME
Qty: 0 | Refills: 0 | Status: DISCONTINUED | OUTPATIENT
Start: 2018-08-24 | End: 2018-08-25

## 2018-08-24 RX ORDER — METOPROLOL TARTRATE 50 MG
100 TABLET ORAL
Qty: 0 | Refills: 0 | Status: DISCONTINUED | OUTPATIENT
Start: 2018-08-24 | End: 2018-08-24

## 2018-08-24 RX ORDER — WARFARIN SODIUM 2.5 MG/1
5 TABLET ORAL ONCE
Qty: 0 | Refills: 0 | Status: DISCONTINUED | OUTPATIENT
Start: 2018-08-24 | End: 2018-08-24

## 2018-08-24 RX ORDER — POTASSIUM CHLORIDE 20 MEQ
40 PACKET (EA) ORAL ONCE
Qty: 0 | Refills: 0 | Status: DISCONTINUED | OUTPATIENT
Start: 2018-08-24 | End: 2018-08-24

## 2018-08-24 RX ORDER — WARFARIN SODIUM 2.5 MG/1
2 TABLET ORAL ONCE
Qty: 0 | Refills: 0 | Status: DISCONTINUED | OUTPATIENT
Start: 2018-08-24 | End: 2018-08-24

## 2018-08-24 RX ORDER — WARFARIN SODIUM 2.5 MG/1
2 TABLET ORAL ONCE
Qty: 0 | Refills: 0 | Status: COMPLETED | OUTPATIENT
Start: 2018-08-24 | End: 2018-08-24

## 2018-08-24 RX ORDER — METOPROLOL TARTRATE 50 MG
50 TABLET ORAL EVERY 6 HOURS
Qty: 0 | Refills: 0 | Status: DISCONTINUED | OUTPATIENT
Start: 2018-08-24 | End: 2018-08-26

## 2018-08-24 RX ORDER — THIAMINE MONONITRATE (VIT B1) 100 MG
100 TABLET ORAL DAILY
Qty: 0 | Refills: 0 | Status: DISCONTINUED | OUTPATIENT
Start: 2018-08-25 | End: 2018-08-28

## 2018-08-24 RX ORDER — POTASSIUM CHLORIDE 20 MEQ
40 PACKET (EA) ORAL ONCE
Qty: 0 | Refills: 0 | Status: COMPLETED | OUTPATIENT
Start: 2018-08-24 | End: 2018-08-24

## 2018-08-24 RX ORDER — FOLIC ACID 0.8 MG
1 TABLET ORAL DAILY
Qty: 0 | Refills: 0 | Status: DISCONTINUED | OUTPATIENT
Start: 2018-08-25 | End: 2018-08-28

## 2018-08-24 RX ORDER — POTASSIUM CHLORIDE 20 MEQ
20 PACKET (EA) ORAL
Qty: 0 | Refills: 0 | Status: COMPLETED | OUTPATIENT
Start: 2018-08-24 | End: 2018-08-25

## 2018-08-24 RX ADMIN — Medication 50 MILLIGRAM(S): at 15:04

## 2018-08-24 RX ADMIN — Medication 100 MILLIGRAM(S): at 05:54

## 2018-08-24 RX ADMIN — Medication 20 MILLIEQUIVALENT(S): at 18:21

## 2018-08-24 RX ADMIN — AMLODIPINE BESYLATE 5 MILLIGRAM(S): 2.5 TABLET ORAL at 10:08

## 2018-08-24 RX ADMIN — CARBIDOPA AND LEVODOPA 1 TABLET(S): 25; 100 TABLET ORAL at 21:49

## 2018-08-24 RX ADMIN — CEFTRIAXONE 100 GRAM(S): 500 INJECTION, POWDER, FOR SOLUTION INTRAMUSCULAR; INTRAVENOUS at 12:37

## 2018-08-24 RX ADMIN — FINASTERIDE 5 MILLIGRAM(S): 5 TABLET, FILM COATED ORAL at 12:38

## 2018-08-24 RX ADMIN — Medication 50 MILLIGRAM(S): at 21:49

## 2018-08-24 RX ADMIN — Medication 40 MILLIEQUIVALENT(S): at 07:28

## 2018-08-24 RX ADMIN — Medication 20 MILLIEQUIVALENT(S): at 21:50

## 2018-08-24 RX ADMIN — Medication 1 MILLIGRAM(S): at 21:49

## 2018-08-24 RX ADMIN — SODIUM CHLORIDE 75 MILLILITER(S): 9 INJECTION, SOLUTION INTRAVENOUS at 12:38

## 2018-08-24 RX ADMIN — CARBIDOPA AND LEVODOPA 1 TABLET(S): 25; 100 TABLET ORAL at 05:55

## 2018-08-24 RX ADMIN — Medication 1 APPLICATION(S): at 12:39

## 2018-08-24 RX ADMIN — CARBIDOPA AND LEVODOPA 1 TABLET(S): 25; 100 TABLET ORAL at 14:09

## 2018-08-24 RX ADMIN — WARFARIN SODIUM 2 MILLIGRAM(S): 2.5 TABLET ORAL at 21:47

## 2018-08-24 NOTE — PROGRESS NOTE ADULT - PROBLEM SELECTOR PLAN 8
-Holding home HTN will continue pending patient improvement Pt on lotrel 5-40 at home. Was holding 2/2 sepsis and ARF. BP now increasing to 160s  -will restart amlodipine 5mg today  -continue to hold benazepril in setting of ARF

## 2018-08-24 NOTE — PROGRESS NOTE ADULT - PROBLEM SELECTOR PLAN 8
Pt on lotrel 5-40 at home. Was holding 2/2 sepsis and ARF. BP now increasing to 160s  -will restart amlodipine 5mg today  -continue to hold benazepril in setting of ARF  - If NILS resolves cotinue restarting benazpril

## 2018-08-24 NOTE — PROGRESS NOTE ADULT - PROBLEM SELECTOR PLAN 9
-CIWA consistently negative; last drink >72 hours ago  -S/p 2 0.5 mg Ativan doses, most likely confounded by Parkinson's disease    #Goals of care discussion  - palliative on board, f/u recs

## 2018-08-24 NOTE — PROGRESS NOTE ADULT - SUBJECTIVE AND OBJECTIVE BOX
SADIA LUTZ   [id number]     (1927):   CC: confusion  HPI:  89M with Parkinson's (on Sinemet), Afib (Toprol XL and Coumadin), HTN, BPH and ETOH abuse who was BIBEMS after being found down at home. Wife was out of town caring for another sick family member.  He was alone and probably fell 24-48 hour earlier.  On admission was in renal failure and found to have a UTI.  Toxic screen was negative for drugs or alcohol.    Subjective:  Pateint much less confused today, generally making more sense but still not fully oriented to place and time..  Remains restrained because he was trying to get OOB.  Also had NG tube placed to give him his Sinemet and PO metoprolol.  Speech remains difficult to understand.        ROS:    Unable to obtain due to: patient is unreliable and inconsistent historian, although there has been some minimal improvement since admission                     Dyspnea (Carissa 0-10):    denies                    N/V (Y/N):                N              Secretions (Y/N) :    N            Agitation(Y/N):        N  Pain (Y/N):     no  -Provocation/Palliation:  -Quality/Quantity:  -Radiating:  -Severity:  -Timing/Frequency:  -Impact on ADLs:    General:  no details available and patient cannot provide information  HEENT:  no details available and patient cannot provide information  Neck:  no details available and patient cannot provide information  CVS: denies CP or SOB  Resp:  Denied  GI:  no details available and patient cannot provide information  :  no details available and patient cannot provide information  Musc:  denies  Neuro: Parkinsons, reportedly fully functional at home with all ADL's.  Some shuffling gait  Psych:  Denied  Skin:  no details available and patient cannot provide information  Lymph:  no details available and patient cannot provide information    Allergies    aspirin (Unknown)    Intolerances        Opiate Naive (Y/N): yes, med list obtained from patient's pharmacy      Medications:      MEDICATIONS  (STANDING):  amLODIPine   Tablet 5 milliGRAM(s) Oral daily  BACItracin   Ointment 1 Application(s) Topical daily  carbidopa/levodopa  25/100 1 Tablet(s) Oral three times a day  cefTRIAXone   IVPB 1 Gram(s) IV Intermittent every 24 hours  dextrose 5% 1000 milliLiter(s) (75 mL/Hr) IV Continuous <Continuous>  finasteride 5 milliGRAM(s) Oral daily  LORazepam   Injectable 0.5 milliGRAM(s) IV Push once  metoprolol tartrate 100 milliGRAM(s) Oral two times a day  tamsulosin 0.8 milliGRAM(s) Oral at bedtime  warfarin 2 milliGRAM(s) Oral once    MEDICATIONS  (PRN):        ALSO ON COUMADIN AT HOME.  PRESENTLY BEING HELD    Labs:                                     12.1   7.1   )-----------( 170      ( 24 Aug 2018 05:50 )             37.0   08-24    150<H>  |  114<H>  |  38<H>  ----------------------------<  107<H>  3.4<L>   |  20<L>  |  1.39<H>    Ca    8.8      24 Aug 2018 05:50  Phos  2.4       Mg     1.9               Imaging:  Reviewed   < from: CT Head No Cont (18 @ 11:24) >  IMPRESSION:  1. No CT evidence of acute intracranial hemorrhage.  2. 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  3. No significant change compared to the prior study of 8/3/2018.    < end of copied text >      < from: CT Abdomen and Pelvis No Cont (18 @ 11:58) >  IMPRESSION:    No acute thoracic or abdominopelvic pathology.    < end of copied text >      < from: CT Cervical Spine No Cont (18 @ 11:59) >  mpression: Mild degenerative changes of the cervical spine with no   evidence of acute fractures.    < end of copied text >        PEx:  Vital Signs Last 24 Hrs  T(C): 37 (24 Aug 2018 10:59), Max: 37 (23 Aug 2018 22:04)  T(F): 98.6 (24 Aug 2018 10:59), Max: 98.6 (23 Aug 2018 22:04)  HR: 94 (24 Aug 2018 12:49) (94 - 150)  BP: 137/95 (24 Aug 2018 12:49) (137/95 - 170/89)  BP(mean): 112 (24 Aug 2018 12:49) (112 - 137)  RR: 18 (24 Aug 2018 12:49) (18 - 18)  SpO2: 100% (24 Aug 2018 12:49) (100% - 100%)        General: elderly plethoric gentleman in NAD, sometimes difficult to understand  confusion but  converses easily  HEENT:  non-icteric sclera, multiple bruises abrasion on chin and both cheeks, NG tube in place  Neck: no adenopathy  CVS: irreg tachycardia, distant, no murmur heard  Resp: clear  GI:  protuberant, normal BS, non-tender  :  normal  Musc:   equal strength, no significant wasting  Neuro: non focal, oriented to person place, but not accurate to time.  stories somewhat disjointed, but is able to discuss the difficulties with his gait and that he needs to pick his feet up when he walks  Psych:   confused  Skin: diffuse actinic keratoses and evidence of sun damage, multiple abrasions including both knees  Lymph:no adenopathy  Preadmit Karnofsky:  	                    presumed 60-70%           Current Karnofsky:     40%  Cachexia (Y/N): no  BMI:    Advanced Directives:     Full Code      Decision maker: Patient presently not able to make complex medical decisions  Legal surrogate: wife Angle Lutz    Social History: .  Wife is about 20 years younger.  He was a , she was an ad  for the [employer], no Children.  He is [place]  Patient and wife are residents of [facility]    GOALS OF CARE DISCUSSION             Documentation of GOC: to recover from this event, to go to rehab.  Wife presently overwhelmed regarding family illness Spoke with her again today- see below	          REFERRALS	        Palliative Med               PT/OT

## 2018-08-24 NOTE — PROGRESS NOTE ADULT - PROBLEM SELECTOR PLAN 6
Pt on Sinemet 25/100 TID PO, however failed speech and swallow and unable to place NGT at this time  - will retry NGT placement  - monitor for s/s of withdrawal from Sinemet Pt on Sinemet 25/100 TID PO, however failed speech and swallow. NGT placed 8/23  - c/w sinemet via NGT

## 2018-08-24 NOTE — PROGRESS NOTE ADULT - PROBLEM SELECTOR PLAN 2
Most likely in setting of ARF + Sepsis also unclear when last dose of Sinemet was and could be suffering from withdrawal from dopaminergic substances. Pt continues to be altered noted to be only oriented to self however pt is more alert and less agitated than yesterday  -Continue IV hydration with banana bag 75ml/hr until this bag is finished  -start tube feeds today as per nutrition recs  -c/w Sinemet 25/100   -Trend BMP/lytes    #hypernatremia-pt found to be hypernatremic to 153 8/23. Likely 2/2 increased sodium load from volume resuscitation in the setting of sepsis, +ARF. Pt currently on D5 with vitamins with improving sodium levels.   -trend sodium  -c/w D5 with vitamins until finished. Then will switch patient to tube feeds with free water

## 2018-08-24 NOTE — PROGRESS NOTE ADULT - PROBLEM SELECTOR PLAN 4
Mild Rhabdomyolysis, CK already downtrending  -Can monitor CK daily  -IVF RESOLVED. Pt had mild Rhabdomyolysis, CK downtrended  -No need for further CK monitoring  -Currently receiving banana bag. Will start tube feeds with free water and stop IVF after banana bag finishes RESOLVED. Pt had mild Rhabdomyolysis, CK downtrended  -No need for further CK monitoring  -Currently receiving D5 with vitamins. Will start tube feeds with free water and stop IVF after D5 with vitamins finishes

## 2018-08-24 NOTE — PROGRESS NOTE ADULT - PROBLEM SELECTOR PLAN 3
Plan as above, most likely in setting of dehydration and obstruction from BPH, patient with underlying CKD Stage 3.   -Downtrending BUN/Cr in setting of IVF  -Strict I/O  -Monitor BMP    #BPH-pt has BPH on proscar at home. Likely missed several doses. ARF likely has a component of obstruction from BPH  -chan currently in place, TOV after patient receives several doses of proscar Plan as above, most likely in setting of dehydration and obstruction from BPH, patient with underlying CKD Stage 3.   -Downtrending BUN/Cr in setting of IVF  -Monitor BMP  -Patient self removed chan in PM, TOV bladderscan In AM if does not void by 4am    #BPH-pt has BPH on proscar at home. Likely missed several doses. ARF likely has a component of obstruction from BPH  -chan currently in place, TOV after patient receives several doses of proscar

## 2018-08-24 NOTE — PROGRESS NOTE ADULT - PROBLEM SELECTOR PLAN 7
-Hold Coumadin in setting of Supra INR  -Continue Toprol-XL 200mg, Afib with RVR response likely in setting of withdrawal of large dose BB  -Pt failed speech and swallow with several tries and failure to place NGT for medication. Will give oral medication if NGT can be placed, however will discuss with pharmacy correct IV dosing to match home dose. Pt has hx of afib on coumadin and toprol-xl 200mg at home. Likely missed several doses. HR was uncontrolled to 160s. Pt received several pushes of lopressor 2/2 inability to take oral medications. NGT was placed for medication administration. Toprol XL cannot be crushed, so patient was started on lopressor 100 BID. HR now controlled. Coumadin was also held in the setting of supratherapeutic INR  -f/u INR and dose coumadin accordingly  -c/w lopressor 100 BID via NGT; consider switching back to toprol xl 200BID when patient can tolerate PO intake

## 2018-08-24 NOTE — DIETITIAN INITIAL EVALUATION ADULT. - OTHER INFO
89 yo/male with PMHx Parkinsons, Afib, HTN, BPH, EtOH abuse, presented after being found down at home. Found to be in severe sepsis and toxic metabolic encephalopathy possible 2/2 UTI. Pt seen in room, awake, alert, but notably with AMS and difficulty understanding speech. Per MD and RN, pt more alert/responsive today. Able to obtain some nutrition history at this time, though some information difficult to understand. He endorses good appetite at home PTA, NKYOLANDE. Currently NPO 2/2 failed SLP exam on 8/23, primarily 2/2 lethargy. NGT placed for temporary nutrition support, with plan for SLP to re-evaluate this weekend per MD. Pt denies N/V/C/D. Skin noted w/bruising, mikael 13. Pt denies pain but would like NGT removed- discussed purpose and pt appearing to understand. No education provided at this time, except for nutrition support prior to SLP re-evaluation.

## 2018-08-24 NOTE — DIETITIAN INITIAL EVALUATION ADULT. - ENERGY NEEDS
Height 72" (approximate); .5#; #; 105%IBW  BMI 25.5  ABW used for calculations as pt between % of IBW. Needs estimated for maintenance in older adults; adjusted for sepsis

## 2018-08-24 NOTE — PROGRESS NOTE ADULT - PROBLEM SELECTOR PLAN 4
Receiving Sinemet via NG tube, although unfortunately he is restrained.  Encourage OOB to chair and PT yoni.  Would like to see restraints discontinued- consider enhanced supervision

## 2018-08-24 NOTE — PROGRESS NOTE ADULT - PROBLEM SELECTOR PLAN 4
RESOLVED. Pt had mild Rhabdomyolysis, CK downtrended  -No need for further CK monitoring  -Currently receiving D5 with vitamins. Will start tube feeds with free water and stop IVF after D5 with vitamins finishes -Continue Ceftriaxone 1g q24h for total 7days treatment (last day 8/27)

## 2018-08-24 NOTE — PROGRESS NOTE ADULT - ATTENDING COMMENTS
Patient seen and examined with house-staff during bedside rounds.  Resident note read, including vitals, physical findings, laboratory data, and radiological reports.   Revisions included below.  Direct personal management at bed side and extensive interpretation of the data.  Plan was outlined and discussed in details with the housestaff.  Decision making of high complexity  Action taken for acute disease activity to reflect the level of care provided:  - medication reconciliation  - review laboratory data  he improved/.  Started tube feed and D5w.  Follow Na.  Continue anticoagulation.  HR and BP are better controlled on BB.  MS is still unclear.  Follow with palliative.  Continue on antibiotic.  Sepsis resolved Cr improved

## 2018-08-24 NOTE — PROGRESS NOTE ADULT - PROBLEM SELECTOR PLAN 2
Most likely in setting of ARF + Sepsis also unclear when last dose of Sinemet was and could be suffering from withdrawal from dopaminergic substances. Pt continues to be altered noted to be only oriented to self.   -Continue IV hydration  -c/w Sinemet 25/100 when NGT is placed  -Trend BMP/lytes Most likely in setting of ARF + Sepsis also unclear when last dose of Sinemet was and could be suffering from withdrawal from dopaminergic substances. Pt continues to be altered noted to be only oriented to self however pt is more alert and less agitated than yesterday  -Continue IV hydration with banana bag 75ml/hr until this bag is finished  -start tube feeds today as per nutrition recs  -c/w Sinemet 25/100   -Trend BMP/lytes    #hypernatremia-pt found to be hypernatremic to 153 8/23. Likely 2/2 increased sodium load from volume resuscitation in the setting of sepsis, +ARF. Pt currently on banana bag with improving sodium levels.   -trend sodium  -c/w Banana bag until finished. Then will switch patient to tube feeds with free water Most likely in setting of ARF + Sepsis also unclear when last dose of Sinemet was and could be suffering from withdrawal from dopaminergic substances. Pt continues to be altered noted to be only oriented to self however pt is more alert and less agitated than yesterday  -Continue IV hydration with banana bag 75ml/hr until this bag is finished  -start tube feeds today as per nutrition recs  -c/w Sinemet 25/100   -Trend BMP/lytes    #hypernatremia-pt found to be hypernatremic to 153 8/23. Likely 2/2 increased sodium load from volume resuscitation in the setting of sepsis, +ARF. Pt currently on D5 with vitamins with improving sodium levels.   -trend sodium  -c/w D5 with vitamins until finished. Then will switch patient to tube feeds with free water

## 2018-08-24 NOTE — PROGRESS NOTE ADULT - PROBLEM SELECTOR PLAN 7
Pt has hx of afib on coumadin and toprol-xl 200mg at home. Likely missed several doses. HR was uncontrolled to 160s. Pt received several pushes of lopressor 2/2 inability to take oral medications. NGT was placed for medication administration. Toprol XL cannot be crushed, so patient was started on lopressor 100 BID. HR now controlled. Coumadin was also held in the setting of supratherapeutic INR  -f/u INR and dose coumadin accordingly  Daily INR, received 2mg tonight (8/24)  -c/w lopressor 100 BID via NGT; consider switching back to toprol xl 200BID when patient can tolerate PO intake Pt on Sinemet 25/100 TID PO, however failed speech and swallow. NGT placed 8/23  - c/w sinemet via NGT

## 2018-08-24 NOTE — PROGRESS NOTE ADULT - PROBLEM SELECTOR PLAN 10
F: 75cc D5 1/3 NS  E: Replete PRN  N: Pt failed speech/swallow, currently NPO   P: Holding HSQ in setting of increased INR  C: FULL CODE - pending discussion of GOC with family  D: Admit to [facility] pending improvement     1) PCP Contacted on Admission: (Y/N) --> Name & Phone #: Dr. Reji Paulino  2) Date of Contact with PCP:  3) PCP Contacted at Discharge: (Y/N, N/A)  4) Summary of Handoff Given to PCP:   5) Post-Discharge Appointment Date and Location: F: 75cc Banana bag;   E: Replete PRN  N: Pt failed speech/swallow, currently NPO; will start tube feeds pending nutrition recs  P: Holding HSQ in setting of increased INR  C: FULL CODE - pending discussion of GOC with family  D: Admit to [facility] pending improvement     1) PCP Contacted on Admission: (Y/N) --> Name & Phone #: Dr. Reji Paulino  2) Date of Contact with PCP:  3) PCP Contacted at Discharge: (Y/N, N/A)  4) Summary of Handoff Given to PCP:   5) Post-Discharge Appointment Date and Location: F: 75cc D5 with vitamins;   E: Replete PRN  N: Pt failed speech/swallow, currently NPO; will start tube feeds pending nutrition recs  P: Holding HSQ in setting of increased INR  C: FULL CODE - pending discussion of GOC with family  D: Admit to [facility] pending improvement     1) PCP Contacted on Admission: (Y/N) --> Name & Phone #: Dr. Reji Paulino  2) Date of Contact with PCP:  3) PCP Contacted at Discharge: (Y/N, N/A)  4) Summary of Handoff Given to PCP:   5) Post-Discharge Appointment Date and Location:

## 2018-08-24 NOTE — PROGRESS NOTE ADULT - PROBLEM SELECTOR PLAN 3
Remains confused.  Received B vitamins and thiamine yesterday.  Seems a bit better.  Would continue.

## 2018-08-24 NOTE — PROGRESS NOTE ADULT - ASSESSMENT
89 y/o M with hx of parkinsons, HTN, BPH, alch abuse, afib, found down at home for unknown period of time admitted for toxic metabolic encephalopathy and acute renal failure and found to have UTI. 89 y/o M with hx of parkinsons, HTN, BPH, alch abuse, afib, found down at home for unknown period of time admitted for toxic metabolic encephalopathy and acute renal failure and found to have UTI. Patient Was septic from a urinary source sepsis has resolved.

## 2018-08-24 NOTE — DIETITIAN INITIAL EVALUATION ADULT. - NS AS NUTRI INTERV ENTERAL NUTRITION
Recommend initiating Jevity 1.2 Yung @ 80mL/hr x 24hrs via NGT to provide: 1920mL TV, 2304 kcal, 106.5g protein, 1549mL free H2O, 192% RDI, 1.25g/kg ABW protein. Recommend starting at 20mL/hr and advancing by [id number] to goal as tolerated. Additional free H2O per team. Monitor for s/s intolerance; maintain aspiration precautions at all times./Concentration/Rate/Route/Composition/Volume/Schedule

## 2018-08-24 NOTE — PROGRESS NOTE ADULT - PROBLEM SELECTOR PLAN 5
-Continue Ceftriaxone 1g q24h for total 7days treatment (last day 8/27) RESOLVED. Pt had mild Rhabdomyolysis, CK downtrended  -No need for further CK monitoring  -Currently receiving D5 with vitamins. Will start tube feeds with free water and stop IVF after D5 with vitamins finishes

## 2018-08-24 NOTE — PROGRESS NOTE ADULT - PROBLEM SELECTOR PLAN 5
Discussed at length with patients wife-  she is greatly overwhelmed by family circumstance.  Explanation of medical situation.  Support provided    Support provided to patient and family. Patient to have access to supportive services during rest of hospital stay as the patient/family deemed necessary ie. Chaplaincy, Massage therapy, Music therapy, Patient and family supportive services, Palliative SW, etc.  Pall  continues to work with wife  Massage therapist had excellent session with patient today

## 2018-08-24 NOTE — PROGRESS NOTE ADULT - PROBLEM SELECTOR PLAN 6
Pt on Sinemet 25/100 TID PO, however failed speech and swallow. NGT placed 8/23  - c/w sinemet via NGT Pt has hx of afib on coumadin and toprol-xl 200mg at home. Likely missed several doses. HR was uncontrolled to 160s. Pt received several pushes of lopressor 2/2 inability to take oral medications. NGT was placed for medication administration. Toprol XL cannot be crushed, so patient was started on lopressor 100 BID. HR now controlled. Coumadin was also held in the setting of supratherapeutic INR  -f/u INR and dose coumadin accordingly  Daily INR, received 2mg tonight (8/24)  -c/w lopressor 100 BID via NGT; consider switching back to toprol xl 200BID when patient can tolerate PO intake

## 2018-08-24 NOTE — PROGRESS NOTE ADULT - ATTENDING COMMENTS
1.. severe sepsis / UTI  2. TME patient seen and examined    reviewed vs, labs    agree w/ PE findings as above w/ additions/ exceptions/ pertinent findings: pt asleep w/ NGT in place; bruises noted on face, pt asleep does not responds to verbal commands, dry MM (on D5 IVFs), lungs grossly CTA b/l , nontender/ nondistended abdomen    1.. severe sepsis / UTI : c/w ceftriaxone   2. TME/ hypernatremia: IVF overnight, started on tube feeds; monitor BMP    rest of plan as above

## 2018-08-24 NOTE — PROGRESS NOTE ADULT - PROBLEM SELECTOR PLAN 1
Patient hypothermic and tachycardic on presentation with elevated lactate and TME. Granted in the setting of mixed picture with multiple underlying conditions unclear if vital signs and TME are in the setting of sepsis. Will be treated as such.   -S/p 3L NS - appropriately fluid resuscitated  -Lactate Cleared  -UTI possible source, being treated with Ceftriaxone 1g q24h  -Reperfusion exam performed, patient with good cap refill, +2 pulses, and warm extremities RESOLVED. Patient hypothermic and tachycardic on presentation with elevated lactate and TME. Granted in the setting of mixed picture with multiple underlying conditions unclear if vital signs and TME are in the setting of sepsis. Will be treated as such.   -S/p 3L NS in ED- appropriately fluid resuscitated  -Lactate Cleared  -UTI possible source, being treated with Ceftriaxone 1g q24h  -Reperfusion exam performed, patient with good cap refill, +2 pulses, and warm extremities

## 2018-08-24 NOTE — PROGRESS NOTE ADULT - PROBLEM SELECTOR PLAN 10
F: 75cc D5 with vitamins;   E: Replete PRN  N: Pt failed speech/swallow, currently NPO; will start tube feeds pending nutrition recs  P: Holding HSQ in setting of increased INR  C: FULL CODE - pending discussion of GOC with family  D: Admit to [facility] pending improvement     1) PCP Contacted on Admission: (Y/N) --> Name & Phone #: Dr. Reji Paulino  2) Date of Contact with PCP:  3) PCP Contacted at Discharge: (Y/N, N/A)  4) Summary of Handoff Given to PCP:   5) Post-Discharge Appointment Date and Location: F: 75cc D5 with vitamins;   E: Replete PRN  N: Pt failed speech/swallow, currently NPO; will start tube   feeds pending nutrition recs  P: Holding HSQ in setting of increased INR  C: FULL CODE - pending discussion of GOC with family  D:  Transfer to 7[facility],    1) PCP Contacted on Admission: (Y/N) --> Name & Phone #: Dr. Reji Paulino  2) Date of Contact with PCP:  3) PCP Contacted at Discharge: (Y/N, N/A)  4) Summary of Handoff Given to PCP:   5) Post-Discharge Appointment Date and Location:

## 2018-08-24 NOTE — PROGRESS NOTE ADULT - PROBLEM SELECTOR PLAN 1
RESOLVED. Patient hypothermic and tachycardic on presentation with elevated lactate and TME. Granted in the setting of mixed picture with multiple underlying conditions unclear if vital signs and TME are in the setting of sepsis. Will be treated as such.   -S/p 3L NS in ED- appropriately fluid resuscitated  -Lactate Cleared  -UTI possible source, being treated with Ceftriaxone 1g q24h  last dose on 27th.  -Reperfusion exam performed, patient with good cap refill, +2 pulses, and warm extremities

## 2018-08-24 NOTE — PROGRESS NOTE ADULT - SUBJECTIVE AND OBJECTIVE BOX
Hospital Course:  89M with Parkinson's (on Sinemet), Afib (Toprol XL and Coumadin), HTN, BPH and ETOH abuse who was BIBEMS after being       INTERVAL HPI/OVERNIGHT EVENTS:  Yesterday, pt was noted to have increasing sodium levels to 153. Pt was switched to D5 with vitamins with improving sodium levels to 150. Pt failed speech and swallow. NGT was placed so patient can receive his medications. Toprol XL unable to be crushed for tube delivery so patient switched to lopressor 100 BID.     VITAL SIGNS:  T(F): 98.5 (08-24-18 @ 06:00)  HR: 126 (08-23-18 @ 20:30)  BP: 145/99 (08-23-18 @ 20:30)  RR: 18 (08-23-18 @ 20:30)  SpO2: 100% (08-23-18 @ 20:30)  Wt(kg): --    PHYSICAL EXAM:    Constitutional: Laying in bed in NAD. Pt states that he is about to go out for breakfast.  HEENT: NGT in place; PERRL, EOMI, sclera non-icteric, neck supple, trachea midline, no masses, no JVD, MMM, good dentition  Respiratory: Difficult to assess 2/2 poor inspiratory effort, but no wheezing, no rhonchi, no rales, without accessory muscle use and no intercostal retractions  Cardiovascular: Tachycardic, normal S1S2, no M/R/G  Gastrointestinal: soft, NTND, no masses palpable, BS normal  Extremities: Warm, well perfused, pulses equal bilateral upper and lower extremities, no edema, no clubbing. Capillary refill <2 sec  Neurological: AAOx1 to person only, pt seems less confused than yesterday, CN Grossly intact  Skin: Normal temperature, warm, dry; bruising on cheeks and nose as well as L knee    MEDICATIONS  (STANDING):  BACItracin   Ointment 1 Application(s) Topical daily  carbidopa/levodopa  25/100 1 Tablet(s) Oral three times a day  cefTRIAXone   IVPB 1 Gram(s) IV Intermittent every 24 hours  dextrose 5% 1000 milliLiter(s) (75 mL/Hr) IV Continuous <Continuous>  finasteride 5 milliGRAM(s) Oral daily  LORazepam   Injectable 0.5 milliGRAM(s) IV Push once  metoprolol tartrate 100 milliGRAM(s) Oral two times a day  tamsulosin 0.8 milliGRAM(s) Oral at bedtime    MEDICATIONS  (PRN):      Allergies    aspirin (Unknown)    Intolerances        LABS:                        12.1   7.1   )-----------( 170      ( 24 Aug 2018 05:50 )             37.0     08-24    150<H>  |  114<H>  |  38<H>  ----------------------------<  107<H>  3.4<L>   |  20<L>  |  1.39<H>    Ca    8.8      24 Aug 2018 05:50  Phos  2.4     08-24  Mg     1.9     08-24      PT/INR - ( 23 Aug 2018 08:36 )   PT: 27.5 sec;   INR: 2.43          PTT - ( 23 Aug 2018 08:36 )  PTT:35.6 sec      RADIOLOGY & ADDITIONAL TESTS:  Reviewed Hospital Course:  89M with Parkinson's (on Sinemet), Afib (Toprol XL and Coumadin), HTN, BPH and ETOH abuse who was BIBEMS after being found down at home. Patient was found face down on the floor by his cleaning lady w/ periorbital swelling and bruising to bilateral cheeks. As per wife patient was last seen well 5 days PTA but she spoke to him on the phone 2 days PTA. Patient was altered on presentation an unable to provide history.     At [facility] T 96.5,  in Afib, /91, RR18 [id number]%. Labs showed BUN/Cr significantly elevated from baseline. Minor trop elevation. UA positive for LE, WBC, Nitrite, and Bacteria. Patient was given Ceftriaxone, 0.5mg Ativan due to tremor, and 2LNS. He was started on bicarb drip and transferred to [facility].     On arrival to [facility] patient minimally responsive. He was admitted to [facility] for treatment of AMS and sepsis. O/N patient became more alert and responsive. However he was not oriented to time and place. Pt unable to tolerate PO intake and continued to miss several doses of his sinemet and Toprol xl. Pt continued to be tachycardic to 160s and several doses of lopressor IVP were given. 8/23 patient was assessed by speech and swallow and recommended NPO. NGT was placed for medication administration and Sinemet was restarted. Toprol XL unable to be crushed, so patient was switched to lopressor 100mg BID with HR normalizing. Pt was also found to have hypernatremia to 153. Pt was switched to D5 with vitamins at 75ml/hr with decreasing sodium levels. Pending nutrition consult to begin tube feeds.       INTERVAL HPI/OVERNIGHT EVENTS:  Yesterday, pt was noted to have increasing sodium levels to 153. Pt was switched to D5 with vitamins with improving sodium levels to 150. Pt failed speech and swallow. NGT was placed so patient can receive his medications. Toprol XL unable to be crushed for tube delivery so patient switched to lopressor 100 BID.     VITAL SIGNS:  T(F): 98.5 (08-24-18 @ 06:00)  HR: 126 (08-23-18 @ 20:30)  BP: 145/99 (08-23-18 @ 20:30)  RR: 18 (08-23-18 @ 20:30)  SpO2: 100% (08-23-18 @ 20:30)  Wt(kg): --    PHYSICAL EXAM:    Constitutional: Laying in bed in NAD.   HEENT: NGT in place; PERRL, EOMI, sclera non-icteric, neck supple, trachea midline, no masses, no JVD, MMM, good dentition  Respiratory: Difficult to assess 2/2 poor inspiratory effort, but no wheezing, no rhonchi, no rales, without accessory muscle use and no intercostal retractions  Cardiovascular: Tachycardic, normal S1S2, no M/R/G  Gastrointestinal: soft, NTND, no masses palpable, BS normal  Extremities: Warm, well perfused, pulses equal bilateral upper and lower extremities, no edema, no clubbing. Capillary refill <2 sec  Neurological: AAOx1 to person only, CN Grossly intact  Skin: Normal temperature, warm, dry; bruising on cheeks and nose as well as L knee    MEDICATIONS  (STANDING):  BACItracin   Ointment 1 Application(s) Topical daily  carbidopa/levodopa  25/100 1 Tablet(s) Oral three times a day  cefTRIAXone   IVPB 1 Gram(s) IV Intermittent every 24 hours  dextrose 5% 1000 milliLiter(s) (75 mL/Hr) IV Continuous <Continuous>  finasteride 5 milliGRAM(s) Oral daily  LORazepam   Injectable 0.5 milliGRAM(s) IV Push once  metoprolol tartrate 100 milliGRAM(s) Oral two times a day  tamsulosin 0.8 milliGRAM(s) Oral at bedtime    MEDICATIONS  (PRN):      Allergies    aspirin (Unknown)    Intolerances        LABS:                                       12.1   7.1   )-----------( 170      ( 24 Aug 2018 05:50 )             37.0       08-24    147<H>  |  112<H>  |  33<H>  ----------------------------<  111<H>  3.4<L>   |  20<L>  |  1.21    Ca    8.9      24 Aug 2018 15:30  Phos  2.4     08-24  Mg     1.9     08-24                    PT/INR - ( 24 Aug 2018 12:34 )   PT: 21.3 sec;   INR: 1.89          PTT - ( 24 Aug 2018 12:34 )  PTT:30.9 sec    Lactate Trend  08-21 @ 15:23 Lactate:1.5   08-21 @ 10:31 Lactate:2.8       CARDIAC MARKERS ( 23 Aug 2018 08:55 )  x     / x     / 752 U/L / x     / x            CAPILLARY BLOOD GLUCOSE            Culture Results:   No growth (08-22 @ 11:39)  Culture Results:   No growth to date. (08-21 @ 15:21)  Culture Results:   No growth to date. (08-21 @ 15:21)            RADIOLOGY & ADDITIONAL TESTS:  Reviewed Hospital Course:  89M with Parkinson's (on Sinemet), Afib (Toprol XL and Coumadin), HTN, BPH and ETOH abuse who was BIBEMS after being found down at home. Patient was found face down on the floor by his cleaning lady w/ periorbital swelling and bruising to bilateral cheeks. As per wife patient was last seen well 5 days PTA but she spoke to him on the phone 2 days PTA. Patient was altered on presentation an unable to provide history.     At [facility] T 96.5,  in Afib, /91, RR18 [id number]%. Labs showed BUN/Cr significantly elevated from baseline. Minor trop elevation. UA positive for LE, WBC, Nitrite, and Bacteria. Patient was given Ceftriaxone, 0.5mg Ativan due to tremor, and 2LNS. He was started on bicarb drip and transferred to [facility].     On arrival to [facility] patient minimally responsive. He was admitted to [facility] for treatment of AMS and sepsis. O/N patient became more alert and responsive. However he was not oriented to time and place. Pt unable to tolerate PO intake and continued to miss several doses of his sinemet and Toprol xl. Pt continued to be tachycardic to 160s and several doses of lopressor IVP were given. 8/23 patient was assessed by speech and swallow and recommended NPO. NGT was placed for medication administration and Sinemet was restarted. Toprol XL unable to be crushed, so patient was switched to lopressor 100mg BID with HR normalizing. Pt was also found to have hypernatremia to 153. Pt was switched to D5 with vitamins at 75ml/hr with decreasing sodium levels. Pending nutrition consult to begin tube feeds.       INTERVAL HPI/OVERNIGHT EVENTS:  Yesterday, pt was noted to have increasing sodium levels to 153. Pt was switched to D5 with vitamins with improving sodium levels to 150. Pt failed speech and swallow. NGT was placed so patient can receive his medications. Toprol XL unable to be crushed for tube delivery so patient switched to lopressor 100 BID.     Vital Signs Last 24 Hrs  T(C): 36.6 (24 Aug 2018 16:00), Max: 37.1 (24 Aug 2018 14:20)  T(F): 97.8 (24 Aug 2018 16:00), Max: 98.7 (24 Aug 2018 14:20)  HR: 84 (24 Aug 2018 17:00) (84 - 110)  BP: 164/80 (24 Aug 2018 17:00) (125/89 - 170/89)  BP(mean): 115 (24 Aug 2018 17:00) (103 - 137)  RR: 18 (24 Aug 2018 17:00) (18 - 18)  SpO2: 97% (24 Aug 2018 17:00) (97% - 100%)  PHYSICAL EXAM:    Constitutional: Laying in bed in NAD.   HEENT: NGT in place; PERRL, EOMI, sclera non-icteric, neck supple, trachea midline, no masses, no JVD, MMM, good dentition  Respiratory: Difficult to assess 2/2 poor inspiratory effort, but no wheezing, no rhonchi, no rales, without accessory muscle use and no intercostal retractions  Cardiovascular: irregular normal S1S2, no M/R/G  Gastrointestinal: soft, NTND, no masses palpable, BS normal  Extremities: Warm, well perfused, pulses equal bilateral upper and lower extremities, no edema, no clubbing. Capillary refill <2 sec  Neurological: AAOx1 to person only, CN Grossly intact  Skin: Normal temperature, warm, dry; bruising on cheeks and nose as well as L knee    MEDICATIONS  (STANDING):  BACItracin   Ointment 1 Application(s) Topical daily  carbidopa/levodopa  25/100 1 Tablet(s) Oral three times a day  cefTRIAXone   IVPB 1 Gram(s) IV Intermittent every 24 hours  dextrose 5% 1000 milliLiter(s) (75 mL/Hr) IV Continuous <Continuous>  finasteride 5 milliGRAM(s) Oral daily  LORazepam   Injectable 0.5 milliGRAM(s) IV Push once  metoprolol tartrate 100 milliGRAM(s) Oral two times a day  tamsulosin 0.8 milliGRAM(s) Oral at bedtime    MEDICATIONS  (PRN):      Allergies    aspirin (Unknown)    Intolerances        LABS:                                       12.1   7.1   )-----------( 170      ( 24 Aug 2018 05:50 )             37.0       08-24    147<H>  |  112<H>  |  33<H>  ----------------------------<  111<H>  3.4<L>   |  20<L>  |  1.21    Ca    8.9      24 Aug 2018 15:30  Phos  2.4     08-24  Mg     1.9     08-24                    PT/INR - ( 24 Aug 2018 12:34 )   PT: 21.3 sec;   INR: 1.89          PTT - ( 24 Aug 2018 12:34 )  PTT:30.9 sec    Lactate Trend  08-21 @ 15:23 Lactate:1.5   08-21 @ 10:31 Lactate:2.8       CARDIAC MARKERS ( 23 Aug 2018 08:55 )  x     / x     / 752 U/L / x     / x            CAPILLARY BLOOD GLUCOSE            Culture Results:   No growth (08-22 @ 11:39)  Culture Results:   No growth to date. (08-21 @ 15:21)  Culture Results:   No growth to date. (08-21 @ 15:21)            RADIOLOGY & ADDITIONAL TESTS:  Reviewed

## 2018-08-24 NOTE — PROGRESS NOTE ADULT - PROBLEM SELECTOR PLAN 3
Plan as above, most likely in setting of dehydration, patient with underlying CKD Stage 3.   -Downtrending BUN/Cr in setting of IVF  -Strict I/O  -Monitor BMP Plan as above, most likely in setting of dehydration and obstruction from BPH, patient with underlying CKD Stage 3.   -Downtrending BUN/Cr in setting of IVF  -Strict I/O  -Monitor BMP    #BPH-pt has BPH on proscar at home. Likely missed several doses. ARF likely has a component of obstruction from BPH  -chan currently in place, TOV after patient receives several doses of proscar

## 2018-08-24 NOTE — PROGRESS NOTE ADULT - SUBJECTIVE AND OBJECTIVE BOX
INTERVAL HPI/OVERNIGHT EVENTS:  Yesterday, pt was noted to have increasing sodium levels to 153. Pt was switched to banana bag with improving sodium levels.     VITAL SIGNS:  T(F): 98.5 (08-24-18 @ 06:00)  HR: 126 (08-23-18 @ 20:30)  BP: 145/99 (08-23-18 @ 20:30)  RR: 18 (08-23-18 @ 20:30)  SpO2: 100% (08-23-18 @ 20:30)  Wt(kg): --    PHYSICAL EXAM:    Constitutional: WDWN, NAD  HEENT: PERRL, EOMI, sclera non-icteric, neck supple, trachea midline, no masses, no JVD, MMM, good dentition  Respiratory: CTA b/l, good air entry b/l, no wheezing, no rhonchi, no rales, without accessory muscle use and no intercostal retractions  Cardiovascular: RRR, normal S1S2, no M/R/G  Gastrointestinal: soft, NTND, no masses palpable, BS normal  Extremities: Warm, well perfused, pulses equal bilateral upper and lower extremities, no edema, no clubbing. Capillary refill <2 sec  Neurological: AAOx3, CN Grossly intact  Skin: Normal temperature, warm, dry    MEDICATIONS  (STANDING):  BACItracin   Ointment 1 Application(s) Topical daily  carbidopa/levodopa  25/100 1 Tablet(s) Oral three times a day  cefTRIAXone   IVPB 1 Gram(s) IV Intermittent every 24 hours  dextrose 5% 1000 milliLiter(s) (75 mL/Hr) IV Continuous <Continuous>  finasteride 5 milliGRAM(s) Oral daily  LORazepam   Injectable 0.5 milliGRAM(s) IV Push once  metoprolol tartrate 100 milliGRAM(s) Oral two times a day  tamsulosin 0.8 milliGRAM(s) Oral at bedtime    MEDICATIONS  (PRN):      Allergies    aspirin (Unknown)    Intolerances        LABS:                        12.1   7.1   )-----------( 170      ( 24 Aug 2018 05:50 )             37.0     08-24    150<H>  |  114<H>  |  38<H>  ----------------------------<  107<H>  3.4<L>   |  20<L>  |  1.39<H>    Ca    8.8      24 Aug 2018 05:50  Phos  2.4     08-24  Mg     1.9     08-24      PT/INR - ( 23 Aug 2018 08:36 )   PT: 27.5 sec;   INR: 2.43          PTT - ( 23 Aug 2018 08:36 )  PTT:35.6 sec      RADIOLOGY & ADDITIONAL TESTS:  Reviewed Hospital Course:  89M with Parkinson's (on Sinemet), Afib (Toprol XL and Coumadin), HTN, BPH and ETOH abuse who was BIBEMS after being found down at home. Patient was found face down on the floor by his cleaning lady w/ periorbital swelling and bruising to bilateral cheeks. As per wife patient was last seen well 5 days PTA but she spoke to him on the phone 2 days PTA. Patient was altered on presentation an unable to provide history.     At [facility] T 96.5,  in Afib, /91, RR18 [id number]%. Labs showed BUN/Cr significantly elevated from baseline. Minor trop elevation. UA positive for LE, WBC, Nitrite, and Bacteria. Patient was given Ceftriaxone, 0.5mg Ativan due to tremor, and 2LNS. He was started on bicarb drip and transferred to [facility].     On arrival to [facility] patient minimally responsive. He was admitted to [facility] for treatment of AMS and sepsis. O/N patient became more alert and responsive. However he was not oriented to time and place. Pt unable to tolerate PO intake and continued to miss several doses of his sinemet and Toprol xl. Pt continued to be tachycardic to 160s and several doses of lopressor IVP were given. 8/23 patient was assessed by speech and swallow and recommended NPO. NGT was placed for medication administration and Sinemet was restarted. Toprol XL unable to be crushed, so patient was switched to lopressor 100mg BID with HR normalizing. Pt was also found to have hypernatremia to 153. Pt was switched to banana bag at 75ml/hr with decreasing sodium levels. Pending nutrition consult to begin tube feeds.       INTERVAL HPI/OVERNIGHT EVENTS:  Yesterday, pt was noted to have increasing sodium levels to 153. Pt was switched to banana bag with improving sodium levels to 150. Pt failed speech and swallow. NGT was placed so patient can receive his medications. Toprol XL unable to be crushed for tube delivery so patient switched to lopressor 100 BID.     VITAL SIGNS:  T(F): 98.5 (08-24-18 @ 06:00)  HR: 126 (08-23-18 @ 20:30)  BP: 145/99 (08-23-18 @ 20:30)  RR: 18 (08-23-18 @ 20:30)  SpO2: 100% (08-23-18 @ 20:30)  Wt(kg): --    PHYSICAL EXAM:    Constitutional: Laying in bed in NAD. Pt states that he is about to go out for breakfast.  HEENT: NGT in place; PERRL, EOMI, sclera non-icteric, neck supple, trachea midline, no masses, no JVD, MMM, good dentition  Respiratory: Difficult to assess 2/2 poor inspiratory effort, but no wheezing, no rhonchi, no rales, without accessory muscle use and no intercostal retractions  Cardiovascular: Tachycardic, normal S1S2, no M/R/G  Gastrointestinal: soft, NTND, no masses palpable, BS normal  Extremities: Warm, well perfused, pulses equal bilateral upper and lower extremities, no edema, no clubbing. Capillary refill <2 sec  Neurological: AAOx1 to person only, pt seems less confused than yesterday, CN Grossly intact  Skin: Normal temperature, warm, dry; bruising on cheeks and nose as well as L knee    MEDICATIONS  (STANDING):  BACItracin   Ointment 1 Application(s) Topical daily  carbidopa/levodopa  25/100 1 Tablet(s) Oral three times a day  cefTRIAXone   IVPB 1 Gram(s) IV Intermittent every 24 hours  dextrose 5% 1000 milliLiter(s) (75 mL/Hr) IV Continuous <Continuous>  finasteride 5 milliGRAM(s) Oral daily  LORazepam   Injectable 0.5 milliGRAM(s) IV Push once  metoprolol tartrate 100 milliGRAM(s) Oral two times a day  tamsulosin 0.8 milliGRAM(s) Oral at bedtime    MEDICATIONS  (PRN):      Allergies    aspirin (Unknown)    Intolerances        LABS:                        12.1   7.1   )-----------( 170      ( 24 Aug 2018 05:50 )             37.0     08-24    150<H>  |  114<H>  |  38<H>  ----------------------------<  107<H>  3.4<L>   |  20<L>  |  1.39<H>    Ca    8.8      24 Aug 2018 05:50  Phos  2.4     08-24  Mg     1.9     08-24      PT/INR - ( 23 Aug 2018 08:36 )   PT: 27.5 sec;   INR: 2.43          PTT - ( 23 Aug 2018 08:36 )  PTT:35.6 sec      RADIOLOGY & ADDITIONAL TESTS:  Reviewed Hospital Course:  89M with Parkinson's (on Sinemet), Afib (Toprol XL and Coumadin), HTN, BPH and ETOH abuse who was BIBEMS after being found down at home. Patient was found face down on the floor by his cleaning lady w/ periorbital swelling and bruising to bilateral cheeks. As per wife patient was last seen well 5 days PTA but she spoke to him on the phone 2 days PTA. Patient was altered on presentation an unable to provide history.     At [facility] T 96.5,  in Afib, /91, RR18 [id number]%. Labs showed BUN/Cr significantly elevated from baseline. Minor trop elevation. UA positive for LE, WBC, Nitrite, and Bacteria. Patient was given Ceftriaxone, 0.5mg Ativan due to tremor, and 2LNS. He was started on bicarb drip and transferred to [facility].     On arrival to [facility] patient minimally responsive. He was admitted to [facility] for treatment of AMS and sepsis. O/N patient became more alert and responsive. However he was not oriented to time and place. Pt unable to tolerate PO intake and continued to miss several doses of his sinemet and Toprol xl. Pt continued to be tachycardic to 160s and several doses of lopressor IVP were given. 8/23 patient was assessed by speech and swallow and recommended NPO. NGT was placed for medication administration and Sinemet was restarted. Toprol XL unable to be crushed, so patient was switched to lopressor 100mg BID with HR normalizing. Pt was also found to have hypernatremia to 153. Pt was switched to D5 with vitamins at 75ml/hr with decreasing sodium levels. Pending nutrition consult to begin tube feeds.       INTERVAL HPI/OVERNIGHT EVENTS:  Yesterday, pt was noted to have increasing sodium levels to 153. Pt was switched to D5 with vitamins with improving sodium levels to 150. Pt failed speech and swallow. NGT was placed so patient can receive his medications. Toprol XL unable to be crushed for tube delivery so patient switched to lopressor 100 BID.     VITAL SIGNS:  T(F): 98.5 (08-24-18 @ 06:00)  HR: 126 (08-23-18 @ 20:30)  BP: 145/99 (08-23-18 @ 20:30)  RR: 18 (08-23-18 @ 20:30)  SpO2: 100% (08-23-18 @ 20:30)  Wt(kg): --    PHYSICAL EXAM:    Constitutional: Laying in bed in NAD. Pt states that he is about to go out for breakfast.  HEENT: NGT in place; PERRL, EOMI, sclera non-icteric, neck supple, trachea midline, no masses, no JVD, MMM, good dentition  Respiratory: Difficult to assess 2/2 poor inspiratory effort, but no wheezing, no rhonchi, no rales, without accessory muscle use and no intercostal retractions  Cardiovascular: Tachycardic, normal S1S2, no M/R/G  Gastrointestinal: soft, NTND, no masses palpable, BS normal  Extremities: Warm, well perfused, pulses equal bilateral upper and lower extremities, no edema, no clubbing. Capillary refill <2 sec  Neurological: AAOx1 to person only, pt seems less confused than yesterday, CN Grossly intact  Skin: Normal temperature, warm, dry; bruising on cheeks and nose as well as L knee    MEDICATIONS  (STANDING):  BACItracin   Ointment 1 Application(s) Topical daily  carbidopa/levodopa  25/100 1 Tablet(s) Oral three times a day  cefTRIAXone   IVPB 1 Gram(s) IV Intermittent every 24 hours  dextrose 5% 1000 milliLiter(s) (75 mL/Hr) IV Continuous <Continuous>  finasteride 5 milliGRAM(s) Oral daily  LORazepam   Injectable 0.5 milliGRAM(s) IV Push once  metoprolol tartrate 100 milliGRAM(s) Oral two times a day  tamsulosin 0.8 milliGRAM(s) Oral at bedtime    MEDICATIONS  (PRN):      Allergies    aspirin (Unknown)    Intolerances        LABS:                        12.1   7.1   )-----------( 170      ( 24 Aug 2018 05:50 )             37.0     08-24    150<H>  |  114<H>  |  38<H>  ----------------------------<  107<H>  3.4<L>   |  20<L>  |  1.39<H>    Ca    8.8      24 Aug 2018 05:50  Phos  2.4     08-24  Mg     1.9     08-24      PT/INR - ( 23 Aug 2018 08:36 )   PT: 27.5 sec;   INR: 2.43          PTT - ( 23 Aug 2018 08:36 )  PTT:35.6 sec      RADIOLOGY & ADDITIONAL TESTS:  Reviewed PGY 1 Transfer Hospital Course:  89M with Parkinson's (on Sinemet), Afib (Toprol XL and Coumadin), HTN, BPH and ETOH abuse who was BIBEMS after being found down at home. Patient was found face down on the floor by his cleaning lady w/ periorbital swelling and bruising to bilateral cheeks. As per wife patient was last seen well 5 days PTA but she spoke to him on the phone 2 days PTA. Patient was altered on presentation an unable to provide history.     At [facility] T 96.5,  in Afib, /91, RR18 [id number]%. Labs showed BUN/Cr significantly elevated from baseline. Minor trop elevation. UA positive for LE, WBC, Nitrite, and Bacteria. Patient was given Ceftriaxone, 0.5mg Ativan due to tremor, and 2LNS. He was started on bicarb drip and transferred to [facility].     On arrival to [facility] patient minimally responsive. He was admitted to [facility] for treatment of AMS and sepsis. O/N patient became more alert and responsive. However he was not oriented to time and place. Pt unable to tolerate PO intake and continued to miss several doses of his sinemet and Toprol xl. Pt continued to be tachycardic to 160s and several doses of lopressor IVP were given. 8/23 patient was assessed by speech and swallow and recommended NPO. NGT was placed for medication administration and Sinemet was restarted. Toprol XL unable to be crushed, so patient was switched to lopressor 100mg BID with HR normalizing. Pt was also found to have hypernatremia to 153. Pt was switched to D5 with vitamins at 75ml/hr with decreasing sodium levels. Pending nutrition consult to begin tube feeds.       INTERVAL HPI/OVERNIGHT EVENTS:  Yesterday, pt was noted to have increasing sodium levels to 153. Pt was switched to D5 with vitamins with improving sodium levels to 150. Pt failed speech and swallow. NGT was placed so patient can receive his medications. Toprol XL unable to be crushed for tube delivery so patient switched to lopressor 100 BID.     VITAL SIGNS:  T(F): 98.5 (08-24-18 @ 06:00)  HR: 126 (08-23-18 @ 20:30)  BP: 145/99 (08-23-18 @ 20:30)  RR: 18 (08-23-18 @ 20:30)  SpO2: 100% (08-23-18 @ 20:30)  Wt(kg): --    PHYSICAL EXAM:    Constitutional: Laying in bed in NAD. Pt states that he is about to go out for breakfast.  HEENT: NGT in place; PERRL, EOMI, sclera non-icteric, neck supple, trachea midline, no masses, no JVD, MMM, good dentition  Respiratory: Difficult to assess 2/2 poor inspiratory effort, but no wheezing, no rhonchi, no rales, without accessory muscle use and no intercostal retractions  Cardiovascular: Tachycardic, normal S1S2, no M/R/G  Gastrointestinal: soft, NTND, no masses palpable, BS normal  Extremities: Warm, well perfused, pulses equal bilateral upper and lower extremities, no edema, no clubbing. Capillary refill <2 sec  Neurological: AAOx1 to person only, pt seems less confused than yesterday, CN Grossly intact  Skin: Normal temperature, warm, dry; bruising on cheeks and nose as well as L knee    MEDICATIONS  (STANDING):  BACItracin   Ointment 1 Application(s) Topical daily  carbidopa/levodopa  25/100 1 Tablet(s) Oral three times a day  cefTRIAXone   IVPB 1 Gram(s) IV Intermittent every 24 hours  dextrose 5% 1000 milliLiter(s) (75 mL/Hr) IV Continuous <Continuous>  finasteride 5 milliGRAM(s) Oral daily  LORazepam   Injectable 0.5 milliGRAM(s) IV Push once  metoprolol tartrate 100 milliGRAM(s) Oral two times a day  tamsulosin 0.8 milliGRAM(s) Oral at bedtime    MEDICATIONS  (PRN):      Allergies    aspirin (Unknown)    Intolerances        LABS:                        12.1   7.1   )-----------( 170      ( 24 Aug 2018 05:50 )             37.0     08-24    150<H>  |  114<H>  |  38<H>  ----------------------------<  107<H>  3.4<L>   |  20<L>  |  1.39<H>    Ca    8.8      24 Aug 2018 05:50  Phos  2.4     08-24  Mg     1.9     08-24      PT/INR - ( 23 Aug 2018 08:36 )   PT: 27.5 sec;   INR: 2.43          PTT - ( 23 Aug 2018 08:36 )  PTT:35.6 sec      RADIOLOGY & ADDITIONAL TESTS:  Reviewed PGY 1 Transfer Hospital Course:  89M with Parkinson's (on Sinemet), Afib (Toprol XL and Coumadin), HTN, BPH and ETOH abuse who was BIBEMS after being found down at home. Patient was altered on presentation an unable to provide history. [facility]  in Afib, /91, RR18 [id number]%. UA positive for LE, WBC, Nitrite, and Bacteria. He was admitted to [facility] for treatment of AMS and sepsis 2/2 UTI. Hospital course complicated by the pt unable to tolerate PO intake and continued to miss several doses of his sinemet and Toprol xl. During hospitalization, pt went into afib with RVR requiring lopressor IVP. NGT placed and restarted on lopressor 50q6 with HR now under control. Of note, patient was found to have hypernatremia to 153, fluids were changed and then stopped with tube feeds restarted and hypernatremia started to resolve. Pt vitals remained HDS and stable for stepdown.       INTERVAL HPI/OVERNIGHT EVENTS:  Yesterday, pt was noted to have increasing sodium levels to 153. Pt was switched to D5 with vitamins with improving sodium levels to 150. Pt failed speech and swallow. NGT was placed so patient can receive his medications. Toprol XL unable to be crushed for tube delivery so patient switched to lopressor 100 BID.     VITAL SIGNS:  T(F): 98.5 (08-24-18 @ 06:00)  HR: 126 (08-23-18 @ 20:30)  BP: 145/99 (08-23-18 @ 20:30)  RR: 18 (08-23-18 @ 20:30)  SpO2: 100% (08-23-18 @ 20:30)  Wt(kg): --    PHYSICAL EXAM:    Constitutional: Laying in bed in NAD. Pt states that he is about to go out for breakfast.  HEENT: NGT in place; PERRL, EOMI, sclera non-icteric, neck supple, trachea midline, no masses, no JVD, MMM, good dentition  Respiratory: Difficult to assess 2/2 poor inspiratory effort, but no wheezing, no rhonchi, no rales, without accessory muscle use and no intercostal retractions  Cardiovascular: Tachycardic, normal S1S2, no M/R/G  Gastrointestinal: soft, NTND, no masses palpable, BS normal  Extremities: Warm, well perfused, pulses equal bilateral upper and lower extremities, no edema, no clubbing. Capillary refill <2 sec  Neurological: AAOx1 to person only, pt seems less confused than yesterday, CN Grossly intact  Skin: Normal temperature, warm, dry; bruising on cheeks and nose as well as L knee    MEDICATIONS  (STANDING):  BACItracin   Ointment 1 Application(s) Topical daily  carbidopa/levodopa  25/100 1 Tablet(s) Oral three times a day  cefTRIAXone   IVPB 1 Gram(s) IV Intermittent every 24 hours  dextrose 5% 1000 milliLiter(s) (75 mL/Hr) IV Continuous <Continuous>  finasteride 5 milliGRAM(s) Oral daily  LORazepam   Injectable 0.5 milliGRAM(s) IV Push once  metoprolol tartrate 100 milliGRAM(s) Oral two times a day  tamsulosin 0.8 milliGRAM(s) Oral at bedtime    MEDICATIONS  (PRN):      Allergies    aspirin (Unknown)    Intolerances        LABS:                        12.1   7.1   )-----------( 170      ( 24 Aug 2018 05:50 )             37.0     08-24    150<H>  |  114<H>  |  38<H>  ----------------------------<  107<H>  3.4<L>   |  20<L>  |  1.39<H>    Ca    8.8      24 Aug 2018 05:50  Phos  2.4     08-24  Mg     1.9     08-24      PT/INR - ( 23 Aug 2018 08:36 )   PT: 27.5 sec;   INR: 2.43          PTT - ( 23 Aug 2018 08:36 )  PTT:35.6 sec      RADIOLOGY & ADDITIONAL TESTS:  Reviewed

## 2018-08-24 NOTE — PROGRESS NOTE ADULT - PROBLEM SELECTOR PLAN 1
continued improvement in BUN/Creatinine with fluids, although remains hypernatremic.  Continue as per primary team

## 2018-08-25 DIAGNOSIS — N40.0 BENIGN PROSTATIC HYPERPLASIA WITHOUT LOWER URINARY TRACT SYMPTOMS: ICD-10-CM

## 2018-08-25 LAB
ANION GAP SERPL CALC-SCNC: 14 MMOL/L — SIGNIFICANT CHANGE UP (ref 5–17)
APTT BLD: 26.5 SEC — LOW (ref 27.5–37.4)
BUN SERPL-MCNC: 28 MG/DL — HIGH (ref 7–23)
CALCIUM SERPL-MCNC: 8.6 MG/DL — SIGNIFICANT CHANGE UP (ref 8.4–10.5)
CHLORIDE SERPL-SCNC: 107 MMOL/L — SIGNIFICANT CHANGE UP (ref 96–108)
CO2 SERPL-SCNC: 22 MMOL/L — SIGNIFICANT CHANGE UP (ref 22–31)
CREAT SERPL-MCNC: 1.26 MG/DL — SIGNIFICANT CHANGE UP (ref 0.5–1.3)
GLUCOSE SERPL-MCNC: 137 MG/DL — HIGH (ref 70–99)
HCT VFR BLD CALC: 35.8 % — LOW (ref 39–50)
HGB BLD-MCNC: 11.8 G/DL — LOW (ref 13–17)
INR BLD: 1.42 — HIGH (ref 0.88–1.16)
MAGNESIUM SERPL-MCNC: 1.6 MG/DL — SIGNIFICANT CHANGE UP (ref 1.6–2.6)
MCHC RBC-ENTMCNC: 31.8 PG — SIGNIFICANT CHANGE UP (ref 27–34)
MCHC RBC-ENTMCNC: 33 G/DL — SIGNIFICANT CHANGE UP (ref 32–36)
MCV RBC AUTO: 96.5 FL — SIGNIFICANT CHANGE UP (ref 80–100)
PHOSPHATE SERPL-MCNC: 2.4 MG/DL — LOW (ref 2.5–4.5)
PLATELET # BLD AUTO: 176 K/UL — SIGNIFICANT CHANGE UP (ref 150–400)
POTASSIUM SERPL-MCNC: 3.4 MMOL/L — LOW (ref 3.5–5.3)
POTASSIUM SERPL-SCNC: 3.4 MMOL/L — LOW (ref 3.5–5.3)
PROTHROM AB SERPL-ACNC: 15.9 SEC — HIGH (ref 9.8–12.7)
RBC # BLD: 3.71 M/UL — LOW (ref 4.2–5.8)
RBC # FLD: 14 % — SIGNIFICANT CHANGE UP (ref 10.3–16.9)
SODIUM SERPL-SCNC: 143 MMOL/L — SIGNIFICANT CHANGE UP (ref 135–145)
WBC # BLD: 7.5 K/UL — SIGNIFICANT CHANGE UP (ref 3.8–10.5)
WBC # FLD AUTO: 7.5 K/UL — SIGNIFICANT CHANGE UP (ref 3.8–10.5)

## 2018-08-25 PROCEDURE — 99233 SBSQ HOSP IP/OBS HIGH 50: CPT

## 2018-08-25 RX ORDER — MAGNESIUM SULFATE 500 MG/ML
2 VIAL (ML) INJECTION ONCE
Qty: 0 | Refills: 0 | Status: COMPLETED | OUTPATIENT
Start: 2018-08-25 | End: 2018-08-25

## 2018-08-25 RX ORDER — POTASSIUM CHLORIDE 20 MEQ
40 PACKET (EA) ORAL ONCE
Qty: 0 | Refills: 0 | Status: COMPLETED | OUTPATIENT
Start: 2018-08-25 | End: 2018-08-25

## 2018-08-25 RX ORDER — SODIUM CHLORIDE 9 MG/ML
1000 INJECTION, SOLUTION INTRAVENOUS
Qty: 0 | Refills: 0 | Status: DISCONTINUED | OUTPATIENT
Start: 2018-08-25 | End: 2018-08-27

## 2018-08-25 RX ORDER — WARFARIN SODIUM 2.5 MG/1
3 TABLET ORAL ONCE
Qty: 0 | Refills: 0 | Status: COMPLETED | OUTPATIENT
Start: 2018-08-25 | End: 2018-08-25

## 2018-08-25 RX ORDER — POTASSIUM PHOSPHATE, MONOBASIC POTASSIUM PHOSPHATE, DIBASIC 236; 224 MG/ML; MG/ML
20 INJECTION, SOLUTION INTRAVENOUS ONCE
Qty: 0 | Refills: 0 | Status: COMPLETED | OUTPATIENT
Start: 2018-08-25 | End: 2018-08-25

## 2018-08-25 RX ORDER — TAMSULOSIN HYDROCHLORIDE 0.4 MG/1
0.4 CAPSULE ORAL AT BEDTIME
Qty: 0 | Refills: 0 | Status: DISCONTINUED | OUTPATIENT
Start: 2018-08-25 | End: 2018-08-25

## 2018-08-25 RX ORDER — TAMSULOSIN HYDROCHLORIDE 0.4 MG/1
0.4 CAPSULE ORAL AT BEDTIME
Qty: 0 | Refills: 0 | Status: DISCONTINUED | OUTPATIENT
Start: 2018-08-25 | End: 2018-08-28

## 2018-08-25 RX ORDER — MAGNESIUM SULFATE 500 MG/ML
8 VIAL (ML) INJECTION ONCE
Qty: 0 | Refills: 0 | Status: DISCONTINUED | OUTPATIENT
Start: 2018-08-25 | End: 2018-08-25

## 2018-08-25 RX ADMIN — CARBIDOPA AND LEVODOPA 1 TABLET(S): 25; 100 TABLET ORAL at 13:13

## 2018-08-25 RX ADMIN — CARBIDOPA AND LEVODOPA 1 TABLET(S): 25; 100 TABLET ORAL at 21:17

## 2018-08-25 RX ADMIN — Medication 50 MILLIGRAM(S): at 08:53

## 2018-08-25 RX ADMIN — SODIUM CHLORIDE 80 MILLILITER(S): 9 INJECTION, SOLUTION INTRAVENOUS at 14:42

## 2018-08-25 RX ADMIN — POTASSIUM PHOSPHATE, MONOBASIC POTASSIUM PHOSPHATE, DIBASIC 64.17 MILLIMOLE(S): 236; 224 INJECTION, SOLUTION INTRAVENOUS at 11:50

## 2018-08-25 RX ADMIN — CEFTRIAXONE 100 GRAM(S): 500 INJECTION, POWDER, FOR SOLUTION INTRAMUSCULAR; INTRAVENOUS at 13:13

## 2018-08-25 RX ADMIN — Medication 20 MILLIEQUIVALENT(S): at 01:20

## 2018-08-25 RX ADMIN — CARBIDOPA AND LEVODOPA 1 TABLET(S): 25; 100 TABLET ORAL at 06:20

## 2018-08-25 RX ADMIN — FINASTERIDE 5 MILLIGRAM(S): 5 TABLET, FILM COATED ORAL at 11:56

## 2018-08-25 RX ADMIN — Medication 50 MILLIGRAM(S): at 05:31

## 2018-08-25 RX ADMIN — Medication 100 MILLIGRAM(S): at 11:54

## 2018-08-25 RX ADMIN — Medication 1 APPLICATION(S): at 13:55

## 2018-08-25 RX ADMIN — Medication 50 MILLIGRAM(S): at 15:08

## 2018-08-25 RX ADMIN — TAMSULOSIN HYDROCHLORIDE 0.4 MILLIGRAM(S): 0.4 CAPSULE ORAL at 21:17

## 2018-08-25 RX ADMIN — Medication 50 GRAM(S): at 11:52

## 2018-08-25 RX ADMIN — Medication 50 MILLIGRAM(S): at 21:17

## 2018-08-25 RX ADMIN — AMLODIPINE BESYLATE 5 MILLIGRAM(S): 2.5 TABLET ORAL at 05:32

## 2018-08-25 RX ADMIN — WARFARIN SODIUM 3 MILLIGRAM(S): 2.5 TABLET ORAL at 21:17

## 2018-08-25 RX ADMIN — Medication 1 MILLIGRAM(S): at 11:54

## 2018-08-25 RX ADMIN — Medication 1 TABLET(S): at 11:54

## 2018-08-25 NOTE — PROGRESS NOTE ADULT - PROBLEM SELECTOR PLAN 3
Most likely in setting of dehydration and obstruction from BPH, patient with underlying CKD Stage 3.   Improving on IVF's, to be continued.  F/up renal function.

## 2018-08-25 NOTE — PROGRESS NOTE ADULT - PROBLEM SELECTOR PLAN 7
C/w Sinemet 25/100 TID. Pt failed swallow eval but this was once when he was less responsive, pt pulled NGT today, bedside swallow eval by RN was done earlier-pt passed, will order diet and await formal f/up from swallow on Monday.    Hypernatremia-resolved with D5, changed IVF's to 1/2 NS to c/w NILS treatment. F/up BMP. Hypokalemia/hypomagnesemia replaced.

## 2018-08-25 NOTE — PROGRESS NOTE ADULT - PROBLEM SELECTOR PLAN 8
Pt on lotrel 5-40 at home. Was holding 2/2 sepsis and ARF. BP now increasing to 160s  Amlodipine 5mg resumed, continue for now. Hold ACE-I for now given NILS.

## 2018-08-25 NOTE — PROGRESS NOTE ADULT - PROBLEM SELECTOR PLAN 2
Based on collateral info obtained from relatives today (by HS) seems that pt is close to baseline. Will c/w restrains for now but will attempt to dc as soon as possible. Likely due to UTI.
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
## 2018-08-25 NOTE — PROGRESS NOTE ADULT - PROBLEM SELECTOR PLAN 6
Pt has hx of afib on coumadin and toprol-xl 200mg at home.   C/w current dose of lopressor, to be transitioned to toprol once PO intake has been fully resumed. C/w coumadin (3mg tonight). F/up INR.

## 2018-08-25 NOTE — PROGRESS NOTE ADULT - SUBJECTIVE AND OBJECTIVE BOX
Patient is a 89y old  Male who presents with a chief complaint of Toxic Metabolic Encephalopathy, Acute Renal Failure, (21 Aug 2018 16:56)      INTERVAL HPI/OVERNIGHT EVENTS:  Seen by me this afternoon. Pulled NGT earlier today. Able to follow very simple commands. Told me he was in heave and said the month is September, unable to tell me who's the president. Feeling ok. Easily falling asleep.    Review of Systems: 12 point review of systems otherwise negative    MEDICATIONS  (STANDING):  amLODIPine   Tablet 5 milliGRAM(s) Oral daily  BACItracin   Ointment 1 Application(s) Topical daily  carbidopa/levodopa  25/100 1 Tablet(s) Oral three times a day  cefTRIAXone   IVPB 1 Gram(s) IV Intermittent every 24 hours  finasteride 5 milliGRAM(s) Oral daily  folic acid 1 milliGRAM(s) Oral daily  LORazepam   Injectable 0.5 milliGRAM(s) IV Push once  metoprolol tartrate 50 milliGRAM(s) Oral every 6 hours  multivitamin  Chewable 1 Tablet(s) Oral daily  sodium chloride 0.45%. 1000 milliLiter(s) (80 mL/Hr) IV Continuous <Continuous>  tamsulosin 0.4 milliGRAM(s) Oral at bedtime  thiamine 100 milliGRAM(s) Oral daily    MEDICATIONS  (PRN):      Allergies    aspirin (Unknown)    Intolerances          Vital Signs Last 24 Hrs  T(C): 36.8 (25 Aug 2018 16:23), Max: 37.2 (24 Aug 2018 23:20)  T(F): 98.2 (25 Aug 2018 16:23), Max: 98.9 (24 Aug 2018 23:20)  HR: 89 (25 Aug 2018 16:23) (89 - 120)  BP: 131/83 (25 Aug 2018 16:23) (117/75 - 156/82)  BP(mean): 112 (24 Aug 2018 22:00) (112 - 112)  RR: 19 (25 Aug 2018 16:23) (18 - 20)  SpO2: 95% (25 Aug 2018 16:23) (95% - 98%)  CAPILLARY BLOOD GLUCOSE          08-24 @ 07:01  -  08-25 @ 07:00  --------------------------------------------------------  IN: 1050 mL / OUT: 1500 mL / NET: -450 mL    08-25 @ 07:01  -  08-25 @ 20:32  --------------------------------------------------------  IN: 0 mL / OUT: 650 mL / NET: -650 mL        Physical Exam:    Daily     Daily   General:  Well appearing, NAD, not cachetic  HEENT:  Nonicteric, PERRLA  CV:  RRR, no murmur, no JVD  Lungs:  CTA B/L, no wheezes, rales, rhonchi  Abdomen:  Soft, non-tender, no distended, positive BS, no hepatosplenomegaly  Extremities:  2+ pulses, no c/c, no edema  Skin:  Warm and dry, no rashes  : +MCKEON  Neuro:  AAOx1-2, non-focal, CN II-XII grossly intact  +Restraints    LABS:                        11.8   7.5   )-----------( 176      ( 25 Aug 2018 06:41 )             35.8     08-25    143  |  107  |  28<H>  ----------------------------<  137<H>  3.4<L>   |  22  |  1.26    Ca    8.6      25 Aug 2018 06:41  Phos  2.4     08-25  Mg     1.6     08-25      PT/INR - ( 25 Aug 2018 06:41 )   PT: 15.9 sec;   INR: 1.42          PTT - ( 25 Aug 2018 06:41 )  PTT:26.5 sec        RADIOLOGY & ADDITIONAL TESTS:

## 2018-08-26 LAB
ANION GAP SERPL CALC-SCNC: 13 MMOL/L — SIGNIFICANT CHANGE UP (ref 5–17)
APTT BLD: 28.3 SEC — SIGNIFICANT CHANGE UP (ref 27.5–37.4)
BUN SERPL-MCNC: 25 MG/DL — HIGH (ref 7–23)
CALCIUM SERPL-MCNC: 8.3 MG/DL — LOW (ref 8.4–10.5)
CHLORIDE SERPL-SCNC: 107 MMOL/L — SIGNIFICANT CHANGE UP (ref 96–108)
CO2 SERPL-SCNC: 23 MMOL/L — SIGNIFICANT CHANGE UP (ref 22–31)
CREAT SERPL-MCNC: 1.21 MG/DL — SIGNIFICANT CHANGE UP (ref 0.5–1.3)
CULTURE RESULTS: SIGNIFICANT CHANGE UP
CULTURE RESULTS: SIGNIFICANT CHANGE UP
GLUCOSE SERPL-MCNC: 104 MG/DL — HIGH (ref 70–99)
HCT VFR BLD CALC: 35.6 % — LOW (ref 39–50)
HGB BLD-MCNC: 11.5 G/DL — LOW (ref 13–17)
INR BLD: 1.61 — HIGH (ref 0.88–1.16)
MAGNESIUM SERPL-MCNC: 1.8 MG/DL — SIGNIFICANT CHANGE UP (ref 1.6–2.6)
MCHC RBC-ENTMCNC: 31.4 PG — SIGNIFICANT CHANGE UP (ref 27–34)
MCHC RBC-ENTMCNC: 32.3 G/DL — SIGNIFICANT CHANGE UP (ref 32–36)
MCV RBC AUTO: 97.3 FL — SIGNIFICANT CHANGE UP (ref 80–100)
PHOSPHATE SERPL-MCNC: 2.8 MG/DL — SIGNIFICANT CHANGE UP (ref 2.5–4.5)
PLATELET # BLD AUTO: 155 K/UL — SIGNIFICANT CHANGE UP (ref 150–400)
POTASSIUM SERPL-MCNC: 3.7 MMOL/L — SIGNIFICANT CHANGE UP (ref 3.5–5.3)
POTASSIUM SERPL-SCNC: 3.7 MMOL/L — SIGNIFICANT CHANGE UP (ref 3.5–5.3)
PROTHROM AB SERPL-ACNC: 18.1 SEC — HIGH (ref 9.8–12.7)
RBC # BLD: 3.66 M/UL — LOW (ref 4.2–5.8)
RBC # FLD: 13.8 % — SIGNIFICANT CHANGE UP (ref 10.3–16.9)
SODIUM SERPL-SCNC: 143 MMOL/L — SIGNIFICANT CHANGE UP (ref 135–145)
SPECIMEN SOURCE: SIGNIFICANT CHANGE UP
SPECIMEN SOURCE: SIGNIFICANT CHANGE UP
WBC # BLD: 6.5 K/UL — SIGNIFICANT CHANGE UP (ref 3.8–10.5)
WBC # FLD AUTO: 6.5 K/UL — SIGNIFICANT CHANGE UP (ref 3.8–10.5)

## 2018-08-26 PROCEDURE — 99233 SBSQ HOSP IP/OBS HIGH 50: CPT

## 2018-08-26 RX ORDER — METOPROLOL TARTRATE 50 MG
200 TABLET ORAL DAILY
Qty: 0 | Refills: 0 | Status: DISCONTINUED | OUTPATIENT
Start: 2018-08-26 | End: 2018-08-28

## 2018-08-26 RX ORDER — WARFARIN SODIUM 2.5 MG/1
3 TABLET ORAL ONCE
Qty: 0 | Refills: 0 | Status: COMPLETED | OUTPATIENT
Start: 2018-08-26 | End: 2018-08-26

## 2018-08-26 RX ORDER — WARFARIN SODIUM 2.5 MG/1
5 TABLET ORAL AT BEDTIME
Qty: 0 | Refills: 0 | Status: DISCONTINUED | OUTPATIENT
Start: 2018-08-26 | End: 2018-08-26

## 2018-08-26 RX ORDER — POTASSIUM CHLORIDE 20 MEQ
40 PACKET (EA) ORAL ONCE
Qty: 0 | Refills: 0 | Status: COMPLETED | OUTPATIENT
Start: 2018-08-26 | End: 2018-08-26

## 2018-08-26 RX ADMIN — CARBIDOPA AND LEVODOPA 1 TABLET(S): 25; 100 TABLET ORAL at 21:57

## 2018-08-26 RX ADMIN — CARBIDOPA AND LEVODOPA 1 TABLET(S): 25; 100 TABLET ORAL at 05:41

## 2018-08-26 RX ADMIN — Medication 1 MILLIGRAM(S): at 11:41

## 2018-08-26 RX ADMIN — CARBIDOPA AND LEVODOPA 1 TABLET(S): 25; 100 TABLET ORAL at 13:11

## 2018-08-26 RX ADMIN — Medication 50 MILLIGRAM(S): at 02:53

## 2018-08-26 RX ADMIN — SODIUM CHLORIDE 80 MILLILITER(S): 9 INJECTION, SOLUTION INTRAVENOUS at 21:56

## 2018-08-26 RX ADMIN — Medication 200 MILLIGRAM(S): at 11:54

## 2018-08-26 RX ADMIN — Medication 100 MILLIGRAM(S): at 11:41

## 2018-08-26 RX ADMIN — CEFTRIAXONE 100 GRAM(S): 500 INJECTION, POWDER, FOR SOLUTION INTRAMUSCULAR; INTRAVENOUS at 13:12

## 2018-08-26 RX ADMIN — TAMSULOSIN HYDROCHLORIDE 0.4 MILLIGRAM(S): 0.4 CAPSULE ORAL at 21:57

## 2018-08-26 RX ADMIN — FINASTERIDE 5 MILLIGRAM(S): 5 TABLET, FILM COATED ORAL at 11:40

## 2018-08-26 RX ADMIN — WARFARIN SODIUM 3 MILLIGRAM(S): 2.5 TABLET ORAL at 21:59

## 2018-08-26 RX ADMIN — Medication 1 APPLICATION(S): at 11:40

## 2018-08-26 RX ADMIN — AMLODIPINE BESYLATE 5 MILLIGRAM(S): 2.5 TABLET ORAL at 05:41

## 2018-08-26 RX ADMIN — Medication 1 TABLET(S): at 11:41

## 2018-08-26 NOTE — PROGRESS NOTE ADULT - SUBJECTIVE AND OBJECTIVE BOX
Patient is a 89y old  Male who presents with a chief complaint of Toxic Metabolic Encephalopathy, Acute Renal Failure, (21 Aug 2018 16:56)      INTERVAL HPI/OVERNIGHT EVENTS:  More awake today and answering more questions. No complaints. Per RN patient has been tolerating puree diet.    Review of Systems: 12 point review of systems otherwise negative    MEDICATIONS  (STANDING):  amLODIPine   Tablet 5 milliGRAM(s) Oral daily  BACItracin   Ointment 1 Application(s) Topical daily  carbidopa/levodopa  25/100 1 Tablet(s) Oral three times a day  cefTRIAXone   IVPB 1 Gram(s) IV Intermittent every 24 hours  finasteride 5 milliGRAM(s) Oral daily  folic acid 1 milliGRAM(s) Oral daily  LORazepam   Injectable 0.5 milliGRAM(s) IV Push once  metoprolol succinate  milliGRAM(s) Oral daily  multivitamin  Chewable 1 Tablet(s) Oral daily  sodium chloride 0.45%. 1000 milliLiter(s) (80 mL/Hr) IV Continuous <Continuous>  tamsulosin 0.4 milliGRAM(s) Oral at bedtime  thiamine 100 milliGRAM(s) Oral daily  warfarin 3 milliGRAM(s) Oral once    MEDICATIONS  (PRN):      Allergies    aspirin (Unknown)    Intolerances          Vital Signs Last 24 Hrs  T(C): 36.8 (26 Aug 2018 16:13), Max: 36.8 (26 Aug 2018 08:31)  T(F): 98.2 (26 Aug 2018 16:13), Max: 98.3 (26 Aug 2018 08:31)  HR: 87 (26 Aug 2018 16:13) (87 - 102)  BP: 107/71 (26 Aug 2018 16:13) (107/71 - 147/93)  BP(mean): --  RR: 17 (26 Aug 2018 16:13) (17 - 18)  SpO2: 97% (26 Aug 2018 16:13) (95% - 98%)  CAPILLARY BLOOD GLUCOSE          08-25 @ 07:01  -  08-26 @ 07:00  --------------------------------------------------------  IN: 0 mL / OUT: 1550 mL / NET: -1550 mL    08-26 @ 07:01 - 08-26 @ 19:56  --------------------------------------------------------  IN: 720 mL / OUT: 500 mL / NET: 220 mL        Physical Exam:    Daily     Daily   General:  Well appearing, NAD, not cachetic  HEENT:  Nonicteric, PERRLA  CV:  RRR, no murmur, no JVD  Lungs:  CTA B/L, no wheezes, rales, rhonchi  Abdomen:  Soft, non-tender, no distended, positive BS, no hepatosplenomegaly  Extremities:  2+ pulses, no c/c, no edema  Skin:  Warm and dry, no rashes  : +chan  Neuro:  AAOx1-2, non-focal, CN II-XII grossly intact  +Restraints    LABS:                        11.5   6.5   )-----------( 155      ( 26 Aug 2018 07:54 )             35.6     08-26    143  |  107  |  25<H>  ----------------------------<  104<H>  3.7   |  23  |  1.21    Ca    8.3<L>      26 Aug 2018 07:54  Phos  2.8     08-26  Mg     1.8     08-26      PT/INR - ( 26 Aug 2018 12:43 )   PT: 18.1 sec;   INR: 1.61          PTT - ( 26 Aug 2018 12:43 )  PTT:28.3 sec        RADIOLOGY & ADDITIONAL TESTS:    ---------------------------------------------------------------------------  I personally reviewed: [  ]EKG   [  ]CXR    [  ] CT    [  ]Other  ---------------------------------------------------------------------------  PLEASE CHECK WHEN PRESENT:     [  ]Heart Failure     [  ] Acute     [  ] Acute on Chronic     [  ] Chronic  -------------------------------------------------------------------     [  ]Diastolic [HFpEF]     [  ]Systolic [HFrEF]     [  ]Combined [HFpEF & HFrEF]     [  ]Other:  -------------------------------------------------------------------  [  ]NILS     [  ]ATN     [  ]Reneal Medullary Necrosis     [  ]Renal Cortical Necrosis     [  ]Other Pathological Lesions:    [  ]CKD 1  [  ]CKD 2  [  ]CKD 3  [  ]CKD 4  [  ]CKD 5  [  ]Other  -------------------------------------------------------------------  [  ]Other/Unspecified:    --------------------------------------------------------------------    Abdominal Nutritional Status  [  ]Malnutrition: See Nutrition Note  [  ]Cachexia  [  ]Other:   [  ]Supplement Ordered:  [  ]Morbid Obesity (BMI >=40] Patient is a 89y old  Male who presents with a chief complaint of Toxic Metabolic Encephalopathy, Acute Renal Failure, (21 Aug 2018 16:56)      INTERVAL HPI/OVERNIGHT EVENTS:  More awake today and answering more questions. No complaints. Per RN patient has been tolerating puree diet.    Review of Systems: 12 point review of systems otherwise negative    MEDICATIONS  (STANDING):  amLODIPine   Tablet 5 milliGRAM(s) Oral daily  BACItracin   Ointment 1 Application(s) Topical daily  carbidopa/levodopa  25/100 1 Tablet(s) Oral three times a day  cefTRIAXone   IVPB 1 Gram(s) IV Intermittent every 24 hours  finasteride 5 milliGRAM(s) Oral daily  folic acid 1 milliGRAM(s) Oral daily  LORazepam   Injectable 0.5 milliGRAM(s) IV Push once  metoprolol succinate  milliGRAM(s) Oral daily  multivitamin  Chewable 1 Tablet(s) Oral daily  sodium chloride 0.45%. 1000 milliLiter(s) (80 mL/Hr) IV Continuous <Continuous>  tamsulosin 0.4 milliGRAM(s) Oral at bedtime  thiamine 100 milliGRAM(s) Oral daily  warfarin 3 milliGRAM(s) Oral once    MEDICATIONS  (PRN):      Allergies    aspirin (Unknown)    Intolerances          Vital Signs Last 24 Hrs  T(C): 36.8 (26 Aug 2018 16:13), Max: 36.8 (26 Aug 2018 08:31)  T(F): 98.2 (26 Aug 2018 16:13), Max: 98.3 (26 Aug 2018 08:31)  HR: 87 (26 Aug 2018 16:13) (87 - 102)  BP: 107/71 (26 Aug 2018 16:13) (107/71 - 147/93)  BP(mean): --  RR: 17 (26 Aug 2018 16:13) (17 - 18)  SpO2: 97% (26 Aug 2018 16:13) (95% - 98%)  CAPILLARY BLOOD GLUCOSE          08-25 @ 07:01  -  08-26 @ 07:00  --------------------------------------------------------  IN: 0 mL / OUT: 1550 mL / NET: -1550 mL    08-26 @ 07:01 - 08-26 @ 19:56  --------------------------------------------------------  IN: 720 mL / OUT: 500 mL / NET: 220 mL        Physical Exam:    Daily     Daily   General:  Well appearing, NAD, not cachetic  HEENT:  Nonicteric, PERRLA  CV:  RRR, no murmur, no JVD  Lungs:  CTA B/L, no wheezes, rales, rhonchi  Abdomen:  Soft, non-tender, no distended, positive BS, no hepatosplenomegaly  Extremities:  2+ pulses, no c/c, no edema  Skin:  Warm and dry, no rashes  : +chan  Neuro:  AAOx1-2, non-focal, CN II-XII grossly intact  +Restraints    LABS:                        11.5   6.5   )-----------( 155      ( 26 Aug 2018 07:54 )             35.6     08-26    143  |  107  |  25<H>  ----------------------------<  104<H>  3.7   |  23  |  1.21    Ca    8.3<L>      26 Aug 2018 07:54  Phos  2.8     08-26  Mg     1.8     08-26      PT/INR - ( 26 Aug 2018 12:43 )   PT: 18.1 sec;   INR: 1.61          PTT - ( 26 Aug 2018 12:43 )  PTT:28.3 sec        RADIOLOGY & ADDITIONAL TESTS:

## 2018-08-26 NOTE — PROGRESS NOTE ADULT - PROBLEM SELECTOR PLAN 2
Based on collateral info obtained from relatives yesterday (by HS) seems that pt is close to baseline. Will attempt to dc restrains now that patient is better.  Likely due to UTI.

## 2018-08-26 NOTE — PROGRESS NOTE ADULT - PROBLEM SELECTOR PLAN 6
Pt has hx of afib and is on coumadin and toprol-xl 200mg at home.   Changed lopressor to toprol today now that he's more awake/alert.  C/w coumadin (3mg again tonight). F/up INR.

## 2018-08-26 NOTE — PROGRESS NOTE ADULT - PROBLEM SELECTOR PLAN 7
C/w Sinemet 25/100 TID. Pt pulled NGT yesterday, bedside assessment done by RN and he's now tolerating puree diet. Formal swallow f/up tomorrow for further recs,    Hypernatremia-resolved with D5, changed IVF's to 1/2 NS to c/w NILS treatment. F/up BMP. Hypokalemia/hypomagnesemia corrected.

## 2018-08-26 NOTE — PROGRESS NOTE ADULT - PROBLEM SELECTOR PLAN 8
Pt on lotrel 5-40 at home. Was holding 2/2 sepsis and ARF. BP now increasing so Amlodipine 5mg resumed, to be continued. Hold ACE-I for now given NILS.

## 2018-08-27 DIAGNOSIS — R63.8 OTHER SYMPTOMS AND SIGNS CONCERNING FOOD AND FLUID INTAKE: ICD-10-CM

## 2018-08-27 DIAGNOSIS — D69.6 THROMBOCYTOPENIA, UNSPECIFIED: ICD-10-CM

## 2018-08-27 LAB
ANION GAP SERPL CALC-SCNC: 13 MMOL/L — SIGNIFICANT CHANGE UP (ref 5–17)
APTT BLD: 29.7 SEC — SIGNIFICANT CHANGE UP (ref 27.5–37.4)
BUN SERPL-MCNC: 20 MG/DL — SIGNIFICANT CHANGE UP (ref 7–23)
CALCIUM SERPL-MCNC: 8.5 MG/DL — SIGNIFICANT CHANGE UP (ref 8.4–10.5)
CHLORIDE SERPL-SCNC: 103 MMOL/L — SIGNIFICANT CHANGE UP (ref 96–108)
CO2 SERPL-SCNC: 21 MMOL/L — LOW (ref 22–31)
CREAT SERPL-MCNC: 1.07 MG/DL — SIGNIFICANT CHANGE UP (ref 0.5–1.3)
GLUCOSE SERPL-MCNC: 99 MG/DL — SIGNIFICANT CHANGE UP (ref 70–99)
HCT VFR BLD CALC: 37.5 % — LOW (ref 39–50)
HGB BLD-MCNC: 12.1 G/DL — LOW (ref 13–17)
INR BLD: 1.82 — HIGH (ref 0.88–1.16)
MAGNESIUM SERPL-MCNC: 1.8 MG/DL — SIGNIFICANT CHANGE UP (ref 1.6–2.6)
MCHC RBC-ENTMCNC: 31.3 PG — SIGNIFICANT CHANGE UP (ref 27–34)
MCHC RBC-ENTMCNC: 32.3 G/DL — SIGNIFICANT CHANGE UP (ref 32–36)
MCV RBC AUTO: 96.9 FL — SIGNIFICANT CHANGE UP (ref 80–100)
PHOSPHATE SERPL-MCNC: 2.3 MG/DL — LOW (ref 2.5–4.5)
PLATELET # BLD AUTO: 166 K/UL — SIGNIFICANT CHANGE UP (ref 150–400)
POTASSIUM SERPL-MCNC: 3.8 MMOL/L — SIGNIFICANT CHANGE UP (ref 3.5–5.3)
POTASSIUM SERPL-SCNC: 3.8 MMOL/L — SIGNIFICANT CHANGE UP (ref 3.5–5.3)
PROTHROM AB SERPL-ACNC: 20.5 SEC — HIGH (ref 9.8–12.7)
RBC # BLD: 3.87 M/UL — LOW (ref 4.2–5.8)
RBC # FLD: 13.7 % — SIGNIFICANT CHANGE UP (ref 10.3–16.9)
SODIUM SERPL-SCNC: 137 MMOL/L — SIGNIFICANT CHANGE UP (ref 135–145)
WBC # BLD: 7.7 K/UL — SIGNIFICANT CHANGE UP (ref 3.8–10.5)
WBC # FLD AUTO: 7.7 K/UL — SIGNIFICANT CHANGE UP (ref 3.8–10.5)

## 2018-08-27 PROCEDURE — 99233 SBSQ HOSP IP/OBS HIGH 50: CPT

## 2018-08-27 RX ORDER — POTASSIUM PHOSPHATE, MONOBASIC POTASSIUM PHOSPHATE, DIBASIC 236; 224 MG/ML; MG/ML
15 INJECTION, SOLUTION INTRAVENOUS ONCE
Qty: 0 | Refills: 0 | Status: COMPLETED | OUTPATIENT
Start: 2018-08-27 | End: 2018-08-27

## 2018-08-27 RX ORDER — MAGNESIUM SULFATE 500 MG/ML
2 VIAL (ML) INJECTION ONCE
Qty: 0 | Refills: 0 | Status: COMPLETED | OUTPATIENT
Start: 2018-08-27 | End: 2018-08-27

## 2018-08-27 RX ORDER — SODIUM CHLORIDE 9 MG/ML
1000 INJECTION, SOLUTION INTRAVENOUS
Qty: 0 | Refills: 0 | Status: COMPLETED | OUTPATIENT
Start: 2018-08-27 | End: 2018-08-27

## 2018-08-27 RX ORDER — WARFARIN SODIUM 2.5 MG/1
5 TABLET ORAL ONCE
Qty: 0 | Refills: 0 | Status: COMPLETED | OUTPATIENT
Start: 2018-08-27 | End: 2018-08-27

## 2018-08-27 RX ADMIN — Medication 1 APPLICATION(S): at 12:52

## 2018-08-27 RX ADMIN — Medication 1 TABLET(S): at 12:53

## 2018-08-27 RX ADMIN — CARBIDOPA AND LEVODOPA 1 TABLET(S): 25; 100 TABLET ORAL at 05:53

## 2018-08-27 RX ADMIN — AMLODIPINE BESYLATE 5 MILLIGRAM(S): 2.5 TABLET ORAL at 05:52

## 2018-08-27 RX ADMIN — SODIUM CHLORIDE 100 MILLILITER(S): 9 INJECTION, SOLUTION INTRAVENOUS at 19:21

## 2018-08-27 RX ADMIN — CARBIDOPA AND LEVODOPA 1 TABLET(S): 25; 100 TABLET ORAL at 15:05

## 2018-08-27 RX ADMIN — FINASTERIDE 5 MILLIGRAM(S): 5 TABLET, FILM COATED ORAL at 12:52

## 2018-08-27 RX ADMIN — Medication 1 MILLIGRAM(S): at 12:53

## 2018-08-27 RX ADMIN — POTASSIUM PHOSPHATE, MONOBASIC POTASSIUM PHOSPHATE, DIBASIC 62.5 MILLIMOLE(S): 236; 224 INJECTION, SOLUTION INTRAVENOUS at 14:58

## 2018-08-27 RX ADMIN — CEFTRIAXONE 100 GRAM(S): 500 INJECTION, POWDER, FOR SOLUTION INTRAMUSCULAR; INTRAVENOUS at 12:53

## 2018-08-27 RX ADMIN — Medication 50 GRAM(S): at 14:59

## 2018-08-27 RX ADMIN — WARFARIN SODIUM 5 MILLIGRAM(S): 2.5 TABLET ORAL at 21:45

## 2018-08-27 RX ADMIN — CARBIDOPA AND LEVODOPA 1 TABLET(S): 25; 100 TABLET ORAL at 21:45

## 2018-08-27 RX ADMIN — Medication 200 MILLIGRAM(S): at 06:00

## 2018-08-27 RX ADMIN — TAMSULOSIN HYDROCHLORIDE 0.4 MILLIGRAM(S): 0.4 CAPSULE ORAL at 21:45

## 2018-08-27 RX ADMIN — Medication 100 MILLIGRAM(S): at 12:53

## 2018-08-27 NOTE — CONSULT NOTE ADULT - ASSESSMENT
89 y/o M with hx of Parkinson disease, HTN, BPH, ETOH abuse, afib on Coumadin, found down at home for unknown period of time (up to 2 days) admitted for toxic metabolic encephalopathy and acute renal failure and found to have UTI. Sepsis of urinary tract infection POA has resolved, NILS has resolved, and TME is improving.    Problem/Plan - 1:  ·  Problem: Severe sepsis.  Plan: RESOLVED. Patient hypothermic and tachycardic on presentation with elevated lactate and TME.   -S/p 3L NS in ED- appropriately fluid resuscitated  -Lactate Cleared  -UTI POA possible source, being treated with Ceftriaxone 1g q24h last dose on 27th.  - UCx still NGTD but will complete abx course as above.     Problem/Plan - 2:  ·  Problem: Toxic metabolic encephalopathy.  Plan: RESOLVING. Most likely in setting of ARF + Sepsis, and missed Sinemet dose(s).    #Hypernatremia  RESOLVED  -pt found to be hypernatremic to 153 8/23.   - sodium has gradually trended down, today Na is 137 (8/27).     Problem/Plan - 3:  ·  Problem: Acute renal failure, unspecified acute renal failure type.  Plan: - in setting of dehydration, obstruction from BPH, underlying CKD 3  - BUN, Cr and BUN:Cr ratio have normalized as of today 8/27  - Patient with Good in situ; pulled Good on Friday night and failed TOV so will attempt TOV tonight or tomorrow    #BPH  - continue tamsulosin 0.4 mg daily    #Rhabdomyolysis  - mild, RESOLVED.
89 yo with Parkinsons found face down at home following a fall 24-48 hours previously.  Dehydrated hypotensive, renal failure.  Perhaps some role of alcohol although screen was negative.

## 2018-08-27 NOTE — PROGRESS NOTE ADULT - PROBLEM SELECTOR PLAN 9
-CIWA consistently negative; last drink >72 hours prior to admission    #Goals of care discussion  - palliative on board, appreciate recs 1) PCP Contacted on Admission: (Y/N) --> Name & Phone #: Dr. Reji Paulino  2) Date of Contact with PCP:  3) PCP Contacted at Discharge: (Y/N, N/A)  4) Summary of Handoff Given to PCP:   5) Post-Discharge Appointment Date and Location: F: [facility] @125  E: Replete PRN  N: Pureed, no liquids; continue folic acid, thiamine, multivitamin due to EtOH hx  PPX: Holding HSQ in setting of increased INR  Code Status: FULL CODE  Dispo: [facility] pending approval for ALBIN

## 2018-08-27 NOTE — PROGRESS NOTE ADULT - SUBJECTIVE AND OBJECTIVE BOX
OVERNIGHT EVENTS:    SUBJECTIVE / INTERVAL HPI: Patient seen and examined at bedside.     VITAL SIGNS:  Vital Signs Last 24 Hrs  T(C): 36.4 (27 Aug 2018 08:28), Max: 36.9 (26 Aug 2018 21:11)  T(F): 97.5 (27 Aug 2018 08:28), Max: 98.4 (26 Aug 2018 21:11)  HR: 87 (27 Aug 2018 08:28) (77 - 88)  BP: 133/80 (27 Aug 2018 08:28) (107/71 - 149/88)  BP(mean): --  RR: 18 (27 Aug 2018 08:28) (17 - 18)  SpO2: 98% (27 Aug 2018 08:28) (96% - 99%)    PHYSICAL EXAM:    ***************************    MEDICATIONS:  MEDICATIONS  (STANDING):  amLODIPine   Tablet 5 milliGRAM(s) Oral daily  BACItracin   Ointment 1 Application(s) Topical daily  carbidopa/levodopa  25/100 1 Tablet(s) Oral three times a day  finasteride 5 milliGRAM(s) Oral daily  folic acid 1 milliGRAM(s) Oral daily  LORazepam   Injectable 0.5 milliGRAM(s) IV Push once  magnesium sulfate  IVPB 2 Gram(s) IV Intermittent once  metoprolol succinate  milliGRAM(s) Oral daily  multivitamin  Chewable 1 Tablet(s) Oral daily  potassium phosphate IVPB 15 milliMole(s) IV Intermittent once  sodium chloride 0.45%. 1000 milliLiter(s) (80 mL/Hr) IV Continuous <Continuous>  tamsulosin 0.4 milliGRAM(s) Oral at bedtime  thiamine 100 milliGRAM(s) Oral daily    MEDICATIONS  (PRN):      ALLERGIES:  Allergies    aspirin (Unknown)    Intolerances        LABS:                        12.1   7.7   )-----------( 166      ( 27 Aug 2018 06:12 )             37.5     08-27    137  |  103  |  20  ----------------------------<  99  3.8   |  21<L>  |  1.07    Ca    8.5      27 Aug 2018 06:12  Phos  2.3     08-27  Mg     1.8     08-27      PT/INR - ( 27 Aug 2018 06:12 )   PT: 20.5 sec;   INR: 1.82          PTT - ( 27 Aug 2018 06:12 )  PTT:29.7 sec    CAPILLARY BLOOD GLUCOSE          RADIOLOGY & ADDITIONAL TESTS: Reviewed. OVERNIGHT EVENTS: CHRIS overnight. Patient resting.    SUBJECTIVE / INTERVAL HPI: Patient seen and examined at bedside. Restraints have been removed. Still with rambling and tangential speech but responds to my questions and makes eye contact. Denies pain. Remainder of ROS limited due to patient's condition. No family at bedside.    VITAL SIGNS:  Vital Signs Last 24 Hrs  T(C): 36.4 (27 Aug 2018 08:28), Max: 36.9 (26 Aug 2018 21:11)  T(F): 97.5 (27 Aug 2018 08:28), Max: 98.4 (26 Aug 2018 21:11)  HR: 87 (27 Aug 2018 08:28) (77 - 88)  BP: 133/80 (27 Aug 2018 08:28) (107/71 - 149/88)  BP(mean): --  RR: 18 (27 Aug 2018 08:28) (17 - 18)  SpO2: 98% (27 Aug 2018 08:28) (96% - 99%)    PHYSICAL EXAM:    Constitutional: WD/WN adult white male, restraints removed now, laying in bed in NAD.  Respiratory: Difficult to assess 2/2 poor inspiratory effort, but no wheezing, no rhonchi, no rales, without accessory muscle use and no intercostal retractions  Cardiovascular: Tachycardic, normal S1S2, no M/R/G  Gastrointestinal: soft, NTND, no masses palpable, BS normal  : Good in situ  Extremities: Warm, well perfused, pulses equal bilateral upper and lower extremities, no edema; L upper arm with deformity 2/2 trauma  Skin: multiple healing contusions on face and chin; chin entirely healed, cheeks with scabs; well-healing scab on L knee    MEDICATIONS:  MEDICATIONS  (STANDING):  amLODIPine   Tablet 5 milliGRAM(s) Oral daily  BACItracin   Ointment 1 Application(s) Topical daily  carbidopa/levodopa  25/100 1 Tablet(s) Oral three times a day  finasteride 5 milliGRAM(s) Oral daily  folic acid 1 milliGRAM(s) Oral daily  LORazepam   Injectable 0.5 milliGRAM(s) IV Push once  magnesium sulfate  IVPB 2 Gram(s) IV Intermittent once  metoprolol succinate  milliGRAM(s) Oral daily  multivitamin  Chewable 1 Tablet(s) Oral daily  potassium phosphate IVPB 15 milliMole(s) IV Intermittent once  sodium chloride 0.45%. 1000 milliLiter(s) (80 mL/Hr) IV Continuous <Continuous>  tamsulosin 0.4 milliGRAM(s) Oral at bedtime  thiamine 100 milliGRAM(s) Oral daily    MEDICATIONS  (PRN):      ALLERGIES:  Allergies    aspirin (Unknown)    Intolerances        LABS:                        12.1   7.7   )-----------( 166      ( 27 Aug 2018 06:12 )             37.5     08-27    137  |  103  |  20  ----------------------------<  99  3.8   |  21<L>  |  1.07    Ca    8.5      27 Aug 2018 06:12  Phos  2.3     08-27  Mg     1.8     08-27      PT/INR - ( 27 Aug 2018 06:12 )   PT: 20.5 sec;   INR: 1.82          PTT - ( 27 Aug 2018 06:12 )  PTT:29.7 sec    CAPILLARY BLOOD GLUCOSE          RADIOLOGY & ADDITIONAL TESTS: Reviewed. OVERNIGHT EVENTS: CHRIS overnight. Patient resting.    SUBJECTIVE / INTERVAL HPI: Patient seen and examined at bedside. Restraints have been removed & patient reports he is eating. Still with rambling and tangential speech but responds to my questions and makes eye contact. Denies pain. Remainder of ROS limited due to patient's condition. No family at bedside.    VITAL SIGNS:  Vital Signs Last 24 Hrs  T(C): 36.4 (27 Aug 2018 08:28), Max: 36.9 (26 Aug 2018 21:11)  T(F): 97.5 (27 Aug 2018 08:28), Max: 98.4 (26 Aug 2018 21:11)  HR: 87 (27 Aug 2018 08:28) (77 - 88)  BP: 133/80 (27 Aug 2018 08:28) (107/71 - 149/88)  BP(mean): --  RR: 18 (27 Aug 2018 08:28) (17 - 18)  SpO2: 98% (27 Aug 2018 08:28) (96% - 99%)    PHYSICAL EXAM:    Constitutional: WD/WN adult white male, restraints removed now, laying in bed in NAD.  Respiratory: Difficult to assess 2/2 poor inspiratory effort, but no wheezing, no rhonchi, no rales, without accessory muscle use and no intercostal retractions  Cardiovascular: Tachycardic, normal S1S2, no M/R/G  Gastrointestinal: soft, NTND, no masses palpable, BS normal  : Good in situ  Extremities: Warm, well perfused, pulses equal bilateral upper and lower extremities, no edema; L upper arm with deformity 2/2 trauma  Skin: multiple healing contusions on face and chin; chin entirely healed, cheeks with scabs; well-healing scab on L knee    MEDICATIONS:  MEDICATIONS  (STANDING):  amLODIPine   Tablet 5 milliGRAM(s) Oral daily  BACItracin   Ointment 1 Application(s) Topical daily  carbidopa/levodopa  25/100 1 Tablet(s) Oral three times a day  finasteride 5 milliGRAM(s) Oral daily  folic acid 1 milliGRAM(s) Oral daily  LORazepam   Injectable 0.5 milliGRAM(s) IV Push once  magnesium sulfate  IVPB 2 Gram(s) IV Intermittent once  metoprolol succinate  milliGRAM(s) Oral daily  multivitamin  Chewable 1 Tablet(s) Oral daily  potassium phosphate IVPB 15 milliMole(s) IV Intermittent once  sodium chloride 0.45%. 1000 milliLiter(s) (80 mL/Hr) IV Continuous <Continuous>  tamsulosin 0.4 milliGRAM(s) Oral at bedtime  thiamine 100 milliGRAM(s) Oral daily    MEDICATIONS  (PRN):      ALLERGIES:  Allergies    aspirin (Unknown)    Intolerances        LABS:                        12.1   7.7   )-----------( 166      ( 27 Aug 2018 06:12 )             37.5     08-27    137  |  103  |  20  ----------------------------<  99  3.8   |  21<L>  |  1.07    Ca    8.5      27 Aug 2018 06:12  Phos  2.3     08-27  Mg     1.8     08-27      PT/INR - ( 27 Aug 2018 06:12 )   PT: 20.5 sec;   INR: 1.82          PTT - ( 27 Aug 2018 06:12 )  PTT:29.7 sec    CAPILLARY BLOOD GLUCOSE          RADIOLOGY & ADDITIONAL TESTS: Reviewed. OVERNIGHT EVENTS: CHRIS overnight. Patient resting. US kidneys with no evidence of hydronephrosis.     SUBJECTIVE / INTERVAL HPI: Patient seen and examined at bedside. Restraints have been removed & patient reports he is eating. Still with rambling and tangential speech but responds to my questions and makes eye contact. Denies pain. Remainder of ROS limited due to patient's condition. No family at bedside.    VITAL SIGNS:  Vital Signs Last 24 Hrs  T(C): 36.4 (27 Aug 2018 08:28), Max: 36.9 (26 Aug 2018 21:11)  T(F): 97.5 (27 Aug 2018 08:28), Max: 98.4 (26 Aug 2018 21:11)  HR: 87 (27 Aug 2018 08:28) (77 - 88)  BP: 133/80 (27 Aug 2018 08:28) (107/71 - 149/88)  BP(mean): --  RR: 18 (27 Aug 2018 08:28) (17 - 18)  SpO2: 98% (27 Aug 2018 08:28) (96% - 99%)    PHYSICAL EXAM:    Constitutional: WD/WN adult white male, restraints removed now, laying in bed in NAD.  Respiratory: Difficult to assess 2/2 poor inspiratory effort, but no wheezing, no rhonchi, no rales, without accessory muscle use and no intercostal retractions  Cardiovascular: Tachycardic, normal S1S2, no M/R/G  Gastrointestinal: soft, NTND, no masses palpable, BS normal  : Good in situ  Extremities: Warm, well perfused, pulses equal bilateral upper and lower extremities, no edema; L upper arm with deformity 2/2 trauma  Skin: multiple healing contusions on face and chin; chin entirely healed, cheeks with scabs; well-healing scab on L knee    MEDICATIONS:  MEDICATIONS  (STANDING):  amLODIPine   Tablet 5 milliGRAM(s) Oral daily  BACItracin   Ointment 1 Application(s) Topical daily  carbidopa/levodopa  25/100 1 Tablet(s) Oral three times a day  finasteride 5 milliGRAM(s) Oral daily  folic acid 1 milliGRAM(s) Oral daily  LORazepam   Injectable 0.5 milliGRAM(s) IV Push once  magnesium sulfate  IVPB 2 Gram(s) IV Intermittent once  metoprolol succinate  milliGRAM(s) Oral daily  multivitamin  Chewable 1 Tablet(s) Oral daily  potassium phosphate IVPB 15 milliMole(s) IV Intermittent once  sodium chloride 0.45%. 1000 milliLiter(s) (80 mL/Hr) IV Continuous <Continuous>  tamsulosin 0.4 milliGRAM(s) Oral at bedtime  thiamine 100 milliGRAM(s) Oral daily    MEDICATIONS  (PRN):      ALLERGIES:  Allergies    aspirin (Unknown)    Intolerances        LABS:                        12.1   7.7   )-----------( 166      ( 27 Aug 2018 06:12 )             37.5     08-27    137  |  103  |  20  ----------------------------<  99  3.8   |  21<L>  |  1.07    Ca    8.5      27 Aug 2018 06:12  Phos  2.3     08-27  Mg     1.8     08-27      PT/INR - ( 27 Aug 2018 06:12 )   PT: 20.5 sec;   INR: 1.82          PTT - ( 27 Aug 2018 06:12 )  PTT:29.7 sec    CAPILLARY BLOOD GLUCOSE          RADIOLOGY & ADDITIONAL TESTS: Reviewed.

## 2018-08-27 NOTE — CONSULT NOTE ADULT - SUBJECTIVE AND OBJECTIVE BOX
Patient is a 89y old  Male who presents with a chief complaint of Toxic Metabolic Encephalopathy, Acute Renal Failure, (21 Aug 2018 16:56)       HPI:  89M with Parkinson's (on Sinemet), Afib (Toprol XL and Coumadin), HTN, BPH and ETOH abuse who was BIBEMS after being found down at home. Patient was found face down on the floor by his cleaning lady today w/ periorbital swelling and bruising to bilateral cheeks. As per wife patient was last seen well 5 days PTA but she spoke to him on the phone 2 days PTA. Patient was altered on presentation an unable to provide history.     [facility]  [facility], /91, RR18 [id number]%. Labs showed BUN/Cr significantly elevated from baseline. Minor trop elevation. UA positive for LE, WBC, Nitrite, and Bacteria. Patient was given Ceftriaxone, 0.5mg Ativan due to tremor, and 2LNS. He was started on bicarb drip and transferred to [facility].     On arrival to [facility] patient minimally responsive. He affirmed to chills, denied fever. Affirmed to nausea. Denied vomiting, abd pain, changes in stool, blood in stool. He denies changes in urinary habits. (21 Aug 2018 16:56)      PAST MEDICAL & SURGICAL HISTORY:  Parkinson's disease  BPH (benign prostatic hyperplasia)  HLD (hyperlipidemia)  HTN (hypertension)  Atrial fibrillation  No significant past surgical history      MEDICATIONS  (STANDING):  amLODIPine   Tablet 5 milliGRAM(s) Oral daily  BACItracin   Ointment 1 Application(s) Topical daily  carbidopa/levodopa  25/100 1 Tablet(s) Oral three times a day  finasteride 5 milliGRAM(s) Oral daily  folic acid 1 milliGRAM(s) Oral daily  lactated ringers. 1000 milliLiter(s) (100 mL/Hr) IV Continuous <Continuous>  metoprolol succinate  milliGRAM(s) Oral daily  multivitamin  Chewable 1 Tablet(s) Oral daily  tamsulosin 0.4 milliGRAM(s) Oral at bedtime  thiamine 100 milliGRAM(s) Oral daily  warfarin 5 milliGRAM(s) Oral once    MEDICATIONS  (PRN):      Social History per wife: lives with his wife in an elevator accessible apartment building has a , no home care services    Functional Level Prior to Admission per wife:ADL independent, walks without assistive devices    FAMILY HISTORY:  No pertinent family history in first degree relatives      CBC Full  -  ( 27 Aug 2018 06:12 )  WBC Count : 7.7 K/uL  Hemoglobin : 12.1 g/dL  Hematocrit : 37.5 %  Platelet Count - Automated : 166 K/uL  Mean Cell Volume : 96.9 fL  Mean Cell Hemoglobin : 31.3 pg  Mean Cell Hemoglobin Concentration : 32.3 g/dL  Auto Neutrophil # : x  Auto Lymphocyte # : x  Auto Monocyte # : x  Auto Eosinophil # : x  Auto Basophil # : x  Auto Neutrophil % : x  Auto Lymphocyte % : x  Auto Monocyte % : x  Auto Eosinophil % : x  Auto Basophil % : x      08-27    137  |  103  |  20  ----------------------------<  99  3.8   |  21<L>  |  1.07    Ca    8.5      27 Aug 2018 06:12  Phos  2.3     08-27  Mg     1.8     08-27              Radiology:    < from: CT Head No Cont (08.21.18 @ 11:24) >  EXAM:  CT BRAIN                           PROCEDURE DATE:  08/21/2018          INTERPRETATION:  INDICATIONS: Altered mental status following fall.    TECHNIQUE:  Serial axial images were obtained from the skull base to the   vertex without the use of intravenous contrast.    COMPARISON EXAMINATION: 8/3/2018.     FINDINGS:    VENTRICLES AND SULCI: Parenchymal volume loss is present which is   commensurate with patient age.   INTRA-AXIAL: No acute transcortical infarct, acute hemorrhage, or midline   shift is present. There is diminished attenuation within the   periventricular and subcortical white matter consistent with chronic   microangiopathic disease.  EXTRA-AXIAL: No extra-axial fluid collection is present.   VISUALIZED SINUSES: No air-fluid levels are identified.   VISUALIZED MASTOIDS:  Clear.  CALVARIUM:  Normal.  MISCELLANEOUS: Native lens are absent bilaterally.     IMPRESSION:  1. No CT evidence of acute intracranial hemorrhage.  2. 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  3. No significant change compared to the prior study of 8/3/2018.      < from: CT Cervical Spine No Cont (08.21.18 @ 11:59) >  EXAM:  CT CERVICAL SPINE                           PROCEDURE DATE:  08/21/2018          INTERPRETATION:  Clinical history: Trauma.    Technique: CT of the cervical spine was performed utilizing helically   obtained axial images from the occiput through T1. Images were   reformatted into coronal and sagittal orientation.    Findings: Comparison is made to a prior CT performed on 8/3/2018 There is   normal curvature to the cervical lordosis. The vertebral bodies are of   normal height and configuration. There is marked loss of disc height with   mild anterior and posterior osteophyte formation at C6/C7. The remaining   intervertebral disc spaces are also within normal limits. There is no   evidence of prevertebral soft tissue swelling. There is mild diffuse   osteopenia.    Evaluation of the individual levels demonstrates at the C3/C4 level there   is a mild disc osteophyte complex flattening the ventral thecal sac.   There is bilateral uncovertebral and facet hypertrophy. There is moderate   right foraminal narrowing. The left neuroforamen is patent.There is no   evidence of spinal cord compression.    At the C4/C5 level there is ex a disc osteophyte complex flattening the   ventral thecal sac. There is bilateral uncovertebral and facet   hypertrophy. There is moderate to severe left and severe right foraminal   narrowing.There is no evidence of spinal cord compression.    At the C5/C6 level there is a disc osteophyte complex flattening the   ventral thecal sac. This bilateral uncovertebral and facet hypertrophy.   There is moderate bilateral foraminal narrowing.There is no evidence of   spinal cord compression.    At the C6/C7 level there is a disc osteophyte complex flattening the   ventral thecal sac. This bilateral uncovertebral and facet hypertrophy.   There is moderate bilateral foraminal narrowing, left greater than right.   There is no evidence of spinal cord compression.    At the C7/T1 level there is no evidence of focal disc herniation or   spinal cord compression. The bilateral neuroforamen are patent.    There is no evidence of acute fractures.    Impression: Mild degenerative changes of the cervical spine with no   evidence of acute fractures.        < from: CT Chest No Cont (08.21.18 @ 11:58) >  EXAM:  CT CHEST                        EXAM:  CT ABDOMEN AND PELVIS                           PROCEDURE DATE:  08/21/2018          INTERPRETATION:  CLINICAL INDICATION: 89-year-old with fall and buttock   ecchymoses.        FINDINGS: CT of the chest, abdomen and pelvis was performed without the   administration of intravenous contrast. Reconstructions were performed in   the sagittal and coronal planes. No prior studies are available for   comparison.    Evaluation of the chest demonstrates thevisualized thyroid gland is   normal in appearance. There is mild cardiomegaly. There is mild bilateral   gynecomastia. Negative for thoracic inlet, axillary, mediastinal or hilar   lymphadenopathy. Evaluation of the lung parenchyma demonstrates a   calcified granuloma within the right upper lobe. There is no   endobronchial lesion. No pulmonary consolidation or mass. There is   coronary arterial calcification. Evaluation of the abdomen demonstrates   that the unenhanced liver, spleen, pancreas, gallbladder, bilateral   adrenal glands and bilateral kidneys are unremarkable aside from a few   scattered granulomatous hepatic and splenic calcifications and sludge   within the gallbladder. Evaluation of the gastrointestinal tract   demonstrates sigmoid and colonic diverticulosis. Normal appearance of the   appendix. Evaluation of the pelvis demonstrates the prostate gland,   seminal vesicles and bladder are normal in appearance. Small   fat-containing left inguinal hernia. No free fluid. No adenopathy.   Calcified mesenteric lymph nodes prior granulomatous disease. Moderate   vascular calcification. Ectasia of ascending thoracic aorta measuring 4.6   cm at the level the main pulmonary artery. Negative for retroperitoneal,   intraperitoneal or extraperitoneal hematoma. There is no destructive   osseous lesion and no evidence of displaced fracture.        IMPRESSION:    No acute thoracic or abdominopelvic pathology.              Vital Signs Last 24 Hrs  T(C): 36.4 (27 Aug 2018 08:28), Max: 36.9 (26 Aug 2018 21:11)  T(F): 97.5 (27 Aug 2018 08:28), Max: 98.4 (26 Aug 2018 21:11)  HR: 87 (27 Aug 2018 08:28) (77 - 88)  BP: 133/80 (27 Aug 2018 08:28) (107/71 - 149/88)  BP(mean): --  RR: 18 (27 Aug 2018 08:28) (17 - 18)  SpO2: 98% (27 Aug 2018 08:28) (96% - 99%)    REVIEW OF SYSTEMS:    CONSTITUTIONAL: No fever, weight loss, or fatigue  EYES: No eye pain, visual disturbances, or discharge  ENMT:  No difficulty hearing, tinnitus, vertigo; No sinus or throat pain  NECK: No pain or stiffness  BREASTS: No pain, masses, or nipple discharge  RESPIRATORY: No cough, wheezing, chills or hemoptysis; No shortness of breath  CARDIOVASCULAR: No chest pain, palpitations, dizziness, or leg swelling  GASTROINTESTINAL: No abdominal or epigastric pain. No nausea, vomiting, or hematemesis; No diarrhea or constipation. No melena or hematochezia.  GENITOURINARY: No dysuria, frequency, hematuria, or incontinence  NEUROLOGICAL: No headaches, memory loss, loss of strength, numbness, or tremors  SKIN: No itching, burning, rashes, or lesions   LYMPH NODES: No enlarged glands  ENDOCRINE: No heat or cold intolerance; No hair loss  MUSCULOSKELETAL: No joint pain or swelling; No muscle, back, or extremity pain  PSYCHIATRIC: No depression, anxiety, mood swings, or difficulty sleeping  HEME/LYMPH: No easy bruising, or bleeding gums  ALLERGY AND IMMUNOLOGIC: No hives or eczema  VASCULAR: no swelling, erythema      Physical Exam: 89 yo  gentleman lying in semi Balderrama's position, NAD    Head: normocephalic, bilateral cheek and chin abrasions    Eyes: PERRLA, EOMI, no nystagmus, sclera anicteric    ENT: nasal discharge, uvula midline, no oropharyngeal erythema/exudate    Neck: supple, negative JVD, negative carotid bruits, no thyromegaly    Chest: CTA bilaterally, neg wheeze, rhonchi, rales, crackles, egophany    Cardiovascular: regular rate and rhythm, neg murmurs/rubs/gallops    Abdomen: soft, non distended, non tender, negative rebound/guarding, normal bowel sounds, neg hepatosplenomegaly    Extremities: WWP, neg cyanosis/clubbing/edema, negative calf tenderness to palpation, negative Yana's sign, bilateral knee abrasions with left knee scab, pos pill rolling tremors    :     Neurologic Exam:    Alert and oriented x 2 to person, place, speech fluent w/o dysarthria, follows commands     Cranial Nerves:     II:                       pupils equal, round and reactive to light, visual fields intact   III/ IV/VI:            extraocular movements intact, neg nystagmus, ptosis  V:                       facial sensation intact, V1-3 normal  VII:                     face symmetric, no droop, normal eye closure and smile  VIII:                    hearing intact to finger rub bilaterally  IX/ X:                 soft palate rise symmetrical  XI:                      head turning, shoulder shrug normal  XII:                     tongue midline    Motor Exam:    Upper Extremities:              Right:    poor effort > 3+/5                                                          negative pronator drift                                                Left:     poor effort > 3+/5                                                          negative pronator drift      Lower Extremities:            Right:    poor effort > 3+/5                                               Left:      poor effort > 3+/5    Sensory:              intact to LT/PP in all UE/LE dermatomes    DTR:                   = biceps/     triceps/     brachioradialis                            = patella/   medial hamstring/    ankle                            neg clonus                            neg Babinski                            neg Hoffmans      Romberg:  not tested    Tandem Walking:  not tested    Gait:  not tested        PM&R Impression:    1) deconditioned  2) no focal weakness  3) gait dysfunction  4) sepsis/ UTI/ ARF      Recommendations:    1) Physical therapy focusing on therapeutic exercises, bed mobility/transfer out of bed evaluation, progressive ambulation with assistive devices.    2) Anticipated Disposition Plan/Recommendations: subacute rehab placement

## 2018-08-27 NOTE — PHYSICAL THERAPY INITIAL EVALUATION ADULT - CRITERIA FOR SKILLED THERAPEUTIC INTERVENTIONS
rehab potential/predicted duration of therapy intervention/functional limitations in following categories/anticipated equipment needs at discharge/therapy frequency/anticipated discharge recommendation/impairments found/risk reduction/prevention

## 2018-08-27 NOTE — PROGRESS NOTE ADULT - PROBLEM SELECTOR PLAN 1
RESOLVED. Patient hypothermic and tachycardic on presentation with elevated lactate and TME.   -S/p 3L NS in ED- appropriately fluid resuscitated  -Lactate Cleared  -UTI POA possible source, being treated with Ceftriaxone 1g q24h last dose on 27th.  - UCx still NGTD but will complete abx course as above RESOLVED. Patient hypothermic and tachycardic on presentation with elevated lactate and TME.   -S/p 3L NS in ED- appropriately fluid resuscitated  -Lactate Cleared  -UTI POA possible source, being treated with Ceftriaxone 1g q24h last dose on 27th.  - UCx still NGTD but will complete abx course as above  - BCx negative x 5 days

## 2018-08-27 NOTE — PROGRESS NOTE ADULT - PROBLEM SELECTOR PROBLEM 9
Alcohol abuse Transition of care performed with sharing of clinical summary Nutrition, metabolism, and development symptoms

## 2018-08-27 NOTE — PHYSICAL THERAPY INITIAL EVALUATION ADULT - PLANNED THERAPY INTERVENTIONS, PT EVAL
strengthening/postural re-education/balance training/gait training/transfer training/bed mobility training

## 2018-08-27 NOTE — PROGRESS NOTE ADULT - PROBLEM SELECTOR PLAN 2
RESOLVING. Most likely in setting of ARF + Sepsis, and missed Sinemet dose(s).    #Hypernatremia  RESOLVED  -pt found to be hypernatremic to 153 8/23.   - sodium has gradually trended down, today Na is 137 (8/27) RESOLVING. Most likely in setting of ARF + Sepsis, and missed Sinemet dose(s).    #Hypernatremia  RESOLVED  - pt found to be hypernatremic to 153 (8/23).   - sodium has gradually trended down s/p gentle D5 1/2 NS  - today Na is 137 (8/27)  - d/c D5 1/2 NS and switch to LR @ 100

## 2018-08-27 NOTE — PHYSICAL THERAPY INITIAL EVALUATION ADULT - IMPAIRMENTS FOUND, PT EVAL
gait, locomotion, and balance/muscle strength/cognitive impairment/aerobic capacity/endurance/posture/ergonomics and body mechanics

## 2018-08-27 NOTE — PHYSICAL THERAPY INITIAL EVALUATION ADULT - GENERAL OBSERVATIONS, REHAB EVAL
Patient encountered supine in bed, NAD, +CB, +chan, RN cleared patient for PT services, spouse present, +IV.

## 2018-08-27 NOTE — PROGRESS NOTE ADULT - PROBLEM SELECTOR PLAN 3
- in setting of dehydration, obstruction from BPH, underlying CKD 3  - BUN, Cr and BUN:Cr ratio have normalized as of today 8/27  - Patient with Good in situ; pulled Good on Friday night and failed TOV so will attempt TOV tonight or tomorrow    #BPH  - continue tamsulosin 0.4 mg daily    #Rhabdomyolysis  - mild, RESOLVED - in setting of dehydration, obstruction from BPH, underlying CKD 3  - BUN, Cr and BUN:Cr ratio have normalized as of today 8/27  - Patient with Good in situ; pulled Good on Friday night and failed TOV so will attempt TOV tonight or tomorrow    #BPH  - continue home tamsulosin 0.4 mg daily  - continue home finasteride  #Rhabdomyolysis  - mild, RESOLVED

## 2018-08-27 NOTE — PROGRESS NOTE ADULT - PROBLEM SELECTOR PLAN 10
F: LR @125  E: Replete PRN  N: Pureed, no liquids, pending formal speech and swallow recs    PPX: Holding HSQ in setting of increased INR  Code Status: FULL CODE  Dispo: [facility]    1) PCP Contacted on Admission: (Y/N) --> Name & Phone #: Dr. Reji Paulino  2) Date of Contact with PCP:  3) PCP Contacted at Discharge: (Y/N, N/A)  4) Summary of Handoff Given to PCP:   5) Post-Discharge Appointment Date and Location: 1) PCP Contacted on Admission: (Y/N) --> Name & Phone #: Dr. Reji Paulino  2) Date of Contact with PCP:  3) PCP Contacted at Discharge: (Y/N, N/A)  4) Summary of Handoff Given to PCP:   5) Post-Discharge Appointment Date and Location:

## 2018-08-27 NOTE — PROGRESS NOTE ADULT - PROBLEM SELECTOR PLAN 6
Pt has hx of afib on Coumadin and Toprol XL 200mg at home.   - daily INR and dose Coumadin  - continue Toprol xl 200 BID - continue home amlodipine 5mg  - continue to hold home benazepril until NILS fully resolved

## 2018-08-27 NOTE — PROGRESS NOTE ADULT - PROBLEM SELECTOR PLAN 4
Pt has hx of afib on Coumadin and Toprol XL 200mg at home.   - daily INR and dose Coumadin  - continue Toprol xl 200 BID Pt has hx of afib on Coumadin and Toprol XL 200mg at home.   - continue home Coumadin  - daily INR and dose Coumadin  - continue Toprol xl 200 BID

## 2018-08-27 NOTE — PROGRESS NOTE ADULT - PROBLEM SELECTOR PLAN 5
Discussed at length with patients wife-  she is greatly overwhelmed by family circumstance.  Explanation of medical situation.  Support provided    Support provided to patient and family. Patient to have access to supportive services during rest of hospital stay as the patient/family deemed necessary ie. Chaplaincy, Massage therapy, Music therapy, Patient and family supportive services, Palliative SW, etc.  Pall  continues to work with wife  As discussed during the palliative IDT meeting and as identified during the patients PSSA screening the patient would benefit from: support for wife.  Will need ALBIN placement with expectation to return home

## 2018-08-27 NOTE — PROGRESS NOTE ADULT - ASSESSMENT
89 y/o M with hx of Parkinson disease, HTN, BPH, ETOH abuse, afib on Coumadin, found down at home for unknown period of time (up to 2 days) admitted for toxic metabolic encephalopathy and acute renal failure and found to have UTI. Sepsis of urinary tract infection POA has resolved, NILS has resolved, and TME is improving.

## 2018-08-27 NOTE — PROGRESS NOTE ADULT - SUBJECTIVE AND OBJECTIVE BOX
SADIA LUTZ   MRN-[id number]     ([id number]):   CC: confusion  HPI:  89M with Parkinson's (on Sinemet), Afib (Toprol XL and Coumadin), HTN, BPH and ETOH abuse who was BIBEMS after being found down at home. Wife was out of town caring for another sick family member.  He was alone and probably fell 24-48 hour earlier.  On admission was in renal failure and found to have a UTI.  Toxic screen was negative for drugs or alcohol.    Subjective:  Patient remains confused today but less so.  Able to carry on a better conversation.  Cleared for PO intake.  No longer with NG tube.  Presently not restrainede. Wife at bedside      ROS:    Unable to obtain due to: patient is unreliable and inconsistent historian, although there has been some minimal improvement since admission                     Dyspnea (Carissa 0-10):    denies                    N/V (Y/N):                N              Secretions (Y/N) :    N            Agitation(Y/N):        N  Pain (Y/N):     no  -Provocation/Palliation:  -Quality/Quantity:  -Radiating:  -Severity:  -Timing/Frequency:  -Impact on ADLs:    General:  no details available and patient cannot provide information  HEENT:  no details available and patient cannot provide information  Neck:  no details available and patient cannot provide information  CVS: denies CP or SOB  Resp:  Denied  GI:  no details available and patient cannot provide information  :  no details available and patient cannot provide information  Musc:  still complains of some left knee pain.  Neuro: Parkinsons, level of function not totally clear  Psych:  Denied  Skin:  no details available and patient cannot provide information  Lymph:  no details available and patient cannot provide information    Allergies    aspirin (Unknown)    Intolerances        Opiate Naive (Y/N): yes, med list obtained from patient's pharmacy      Medications:      MEDICATIONS  (STANDING):  amLODIPine   Tablet 5 milliGRAM(s) Oral daily  BACItracin   Ointment 1 Application(s) Topical daily  carbidopa/levodopa  25/100 1 Tablet(s) Oral three times a day  finasteride 5 milliGRAM(s) Oral daily  folic acid 1 milliGRAM(s) Oral daily  lactated ringers. 1000 milliLiter(s) (100 mL/Hr) IV Continuous <Continuous>  metoprolol succinate  milliGRAM(s) Oral daily  multivitamin  Chewable 1 Tablet(s) Oral daily  tamsulosin 0.4 milliGRAM(s) Oral at bedtime  thiamine 100 milliGRAM(s) Oral daily  warfarin 5 milliGRAM(s) Oral once    MEDICATIONS  (PRN):            Labs:                                         12.1   7.7   )-----------( 166      ( 27 Aug 2018 06:12 )             37.5       137  |  103  |  20  ----------------------------<  99  3.8   |  21<L>  |  1.07    Ca    8.5      27 Aug 2018 06:12  Phos  2.3       Mg     1.8             Imaging:  Reviewed   < from: CT Head No Cont (18 @ 11:24) >  IMPRESSION:  1. No CT evidence of acute intracranial hemorrhage.  2. 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  3. No significant change compared to the prior study of 8/3/2018.    < end of copied text >      < from: CT Abdomen and Pelvis No Cont (18 @ 11:58) >  IMPRESSION:    No acute thoracic or abdominopelvic pathology.    < end of copied text >      < from: CT Cervical Spine No Cont (18 @ 11:59) >  mpression: Mild degenerative changes of the cervical spine with no   evidence of acute fractures.    < end of copied text >        PEx:  Vital Signs Last 24 Hrs  T(C): 36.4 (27 Aug 2018 08:28), Max: 36.9 (26 Aug 2018 21:11)  T(F): 97.5 (27 Aug 2018 08:28), Max: 98.4 (26 Aug 2018 21:11)  HR: 87 (27 Aug 2018 08:28) (77 - 88)  BP: 133/80 (27 Aug 2018 08:28) (107/71 - 149/88)  BP(mean): --  RR: 18 (27 Aug 2018 08:28) (17 - 18)  SpO2: 98% (27 Aug 2018 08:28) (96% - 99%)        General: elderly plethoric gentleman in NAD, sometimes difficult to understand secondary to dry mouth.  observed swallowing water from a cup  confusion but  converses easily  HEENT:  non-icteric sclera, multiple healing  bruises abrasion on chin and both cheeks, dry mucous membranes  Neck: no adenopathy  CVS: irreg  distant, no murmur heard  Resp: clear  GI:  protuberant, normal BS, non-tender  :  normal  Musc:   equal strength, no significant wasting, no joint erythema or effusion  Neuro: non focal, oriented to person place, but not accurate to time.  stories somewhat disjointed  Psych:   confused  Skin: diffuse actinic keratoses and evidence of sun damage, multiple abrasions including both knees  Lymph:no adenopathy  Preadmit Karnofsky:  	                    presumed 60-70%           Current Karnofsky:     40%  Cachexia (Y/N): no  BMI:    Advanced Directives:     Full Code      Decision maker: Patient presently not able to make complex medical decisions  Legal surrogate: wife Angle Lutz    Social History: .  Wife is about 20 years younger.  He was a , she was an ad  for the [employer], no Children.  He is [place]  Patient and wife are residents of [facility]    GOALS OF CARE DISCUSSION             Documentation of GOC: to recover from this event, to go to rehab.  Wife presently overwhelmed regarding family illness  Hoping for improvement in strength and mental status	          REFERRALS	        Palliative Med               PT/OT

## 2018-08-27 NOTE — PROGRESS NOTE ADULT - PROBLEM SELECTOR PLAN 7
- c/w home Sinemet -CIWA consistently negative; last drink >72 hours prior to admission    #Goals of care discussion  - palliative on board, appreciate recs

## 2018-08-27 NOTE — PROGRESS NOTE ADULT - PROBLEM SELECTOR PLAN 8
- continue home amlodipine 5mg  - continue to hold home benazepril until NILS fully resolved F: LR @125  E: Replete PRN  N: Pureed, no liquids, pending formal speech and swallow recs  PPX: Holding HSQ in setting of increased INR  Code Status: FULL CODE  Dispo: [facility] pending approval for [facility] F: LR @125  E: Replete PRN  N: Pureed, no liquids; continue folic acid, thiamine, multivitamin due to EtOH hx  PPX: Holding HSQ in setting of increased INR  Code Status: FULL CODE  Dispo: [facility] pending approval for ALBIN - stable  - continue to follow CBC    # Anemia   - stable  - follow CBC

## 2018-08-27 NOTE — PHYSICAL THERAPY INITIAL EVALUATION ADULT - PERTINENT HX OF CURRENT PROBLEM, REHAB EVAL
Patient is a 89 y/o s/p mechanical fall in the home p/w Toxic Metabolic Encephalopathy and Acute Renal Failure.

## 2018-08-28 DIAGNOSIS — J69.0 PNEUMONITIS DUE TO INHALATION OF FOOD AND VOMIT: ICD-10-CM

## 2018-08-28 DIAGNOSIS — R07.89 OTHER CHEST PAIN: ICD-10-CM

## 2018-08-28 LAB
ALBUMIN SERPL ELPH-MCNC: 2.7 G/DL — LOW (ref 3.3–5)
ALBUMIN SERPL ELPH-MCNC: 3.4 G/DL — SIGNIFICANT CHANGE UP (ref 3.3–5)
ALP SERPL-CCNC: 54 U/L — SIGNIFICANT CHANGE UP (ref 40–120)
ALP SERPL-CCNC: 64 U/L — SIGNIFICANT CHANGE UP (ref 40–120)
ALT FLD-CCNC: <5 U/L — LOW (ref 10–45)
ALT FLD-CCNC: <5 U/L — LOW (ref 10–45)
AMMONIA BLD-MCNC: 16 UMOL/L — SIGNIFICANT CHANGE UP (ref 11–55)
ANION GAP SERPL CALC-SCNC: 15 MMOL/L — SIGNIFICANT CHANGE UP (ref 5–17)
ANION GAP SERPL CALC-SCNC: 17 MMOL/L — SIGNIFICANT CHANGE UP (ref 5–17)
APPEARANCE UR: ABNORMAL
APTT BLD: 38.8 SEC — HIGH (ref 27.5–37.4)
AST SERPL-CCNC: 24 U/L — SIGNIFICANT CHANGE UP (ref 10–40)
AST SERPL-CCNC: 32 U/L — SIGNIFICANT CHANGE UP (ref 10–40)
BASE EXCESS BLDA CALC-SCNC: -5 MMOL/L — LOW (ref -2–3)
BASE EXCESS BLDA CALC-SCNC: -6.5 MMOL/L — LOW (ref -2–3)
BASOPHILS NFR BLD AUTO: 0.2 % — SIGNIFICANT CHANGE UP (ref 0–2)
BILIRUB SERPL-MCNC: 0.8 MG/DL — SIGNIFICANT CHANGE UP (ref 0.2–1.2)
BILIRUB SERPL-MCNC: 0.9 MG/DL — SIGNIFICANT CHANGE UP (ref 0.2–1.2)
BILIRUB UR-MCNC: NEGATIVE — SIGNIFICANT CHANGE UP
BUN SERPL-MCNC: 25 MG/DL — HIGH (ref 7–23)
BUN SERPL-MCNC: 28 MG/DL — HIGH (ref 7–23)
CALCIUM SERPL-MCNC: 8.1 MG/DL — LOW (ref 8.4–10.5)
CALCIUM SERPL-MCNC: 9 MG/DL — SIGNIFICANT CHANGE UP (ref 8.4–10.5)
CHLORIDE SERPL-SCNC: 104 MMOL/L — SIGNIFICANT CHANGE UP (ref 96–108)
CHLORIDE SERPL-SCNC: 109 MMOL/L — HIGH (ref 96–108)
CO2 SERPL-SCNC: 17 MMOL/L — LOW (ref 22–31)
CO2 SERPL-SCNC: 21 MMOL/L — LOW (ref 22–31)
COLOR SPEC: YELLOW — SIGNIFICANT CHANGE UP
CREAT SERPL-MCNC: 1.5 MG/DL — HIGH (ref 0.5–1.3)
CREAT SERPL-MCNC: 1.58 MG/DL — HIGH (ref 0.5–1.3)
DIFF PNL FLD: ABNORMAL
EOSINOPHIL NFR BLD AUTO: 3.2 % — SIGNIFICANT CHANGE UP (ref 0–6)
EXTRA GREEN TOP TUBE: SIGNIFICANT CHANGE UP
EXTRA LAVENDER TOP TUBE: SIGNIFICANT CHANGE UP
GAS PNL BLDA: SIGNIFICANT CHANGE UP
GAS PNL BLDA: SIGNIFICANT CHANGE UP
GLUCOSE BLDC GLUCOMTR-MCNC: 99 MG/DL — SIGNIFICANT CHANGE UP (ref 70–99)
GLUCOSE SERPL-MCNC: 120 MG/DL — HIGH (ref 70–99)
GLUCOSE SERPL-MCNC: 140 MG/DL — HIGH (ref 70–99)
GLUCOSE UR QL: NEGATIVE — SIGNIFICANT CHANGE UP
HCO3 BLDA-SCNC: 17 MMOL/L — LOW (ref 21–28)
HCO3 BLDA-SCNC: 20 MMOL/L — LOW (ref 21–28)
HCT VFR BLD CALC: 32.8 % — LOW (ref 39–50)
HCT VFR BLD CALC: 40.1 % — SIGNIFICANT CHANGE UP (ref 39–50)
HGB BLD-MCNC: 10.6 G/DL — LOW (ref 13–17)
HGB BLD-MCNC: 13 G/DL — SIGNIFICANT CHANGE UP (ref 13–17)
INR BLD: 2.28 — HIGH (ref 0.88–1.16)
INR BLD: 2.29 — HIGH (ref 0.88–1.16)
KETONES UR-MCNC: 15 MG/DL
LACTATE SERPL-SCNC: 1.8 MMOL/L — SIGNIFICANT CHANGE UP (ref 0.5–2)
LACTATE SERPL-SCNC: 2.7 MMOL/L — HIGH (ref 0.5–2)
LEUKOCYTE ESTERASE UR-ACNC: ABNORMAL
LYMPHOCYTES # BLD AUTO: 9.1 % — LOW (ref 13–44)
MAGNESIUM SERPL-MCNC: 1.8 MG/DL — SIGNIFICANT CHANGE UP (ref 1.6–2.6)
MCHC RBC-ENTMCNC: 31.3 PG — SIGNIFICANT CHANGE UP (ref 27–34)
MCHC RBC-ENTMCNC: 31.6 PG — SIGNIFICANT CHANGE UP (ref 27–34)
MCHC RBC-ENTMCNC: 32.3 G/DL — SIGNIFICANT CHANGE UP (ref 32–36)
MCHC RBC-ENTMCNC: 32.4 G/DL — SIGNIFICANT CHANGE UP (ref 32–36)
MCV RBC AUTO: 96.8 FL — SIGNIFICANT CHANGE UP (ref 80–100)
MCV RBC AUTO: 97.3 FL — SIGNIFICANT CHANGE UP (ref 80–100)
MONOCYTES NFR BLD AUTO: 5.4 % — SIGNIFICANT CHANGE UP (ref 2–14)
NEUTROPHILS NFR BLD AUTO: 82.1 % — HIGH (ref 43–77)
NITRITE UR-MCNC: NEGATIVE — SIGNIFICANT CHANGE UP
PCO2 BLDA: 28 MMHG — LOW (ref 35–48)
PCO2 BLDA: 37 MMHG — SIGNIFICANT CHANGE UP (ref 35–48)
PH BLDA: 7.35 — SIGNIFICANT CHANGE UP (ref 7.35–7.45)
PH BLDA: 7.4 — SIGNIFICANT CHANGE UP (ref 7.35–7.45)
PH UR: 5.5 — SIGNIFICANT CHANGE UP (ref 5–8)
PHOSPHATE SERPL-MCNC: 3.4 MG/DL — SIGNIFICANT CHANGE UP (ref 2.5–4.5)
PLATELET # BLD AUTO: 142 K/UL — LOW (ref 150–400)
PLATELET # BLD AUTO: 207 K/UL — SIGNIFICANT CHANGE UP (ref 150–400)
PO2 BLDA: 16 MMHG — CRITICAL LOW (ref 83–108)
PO2 BLDA: 274 MMHG — HIGH (ref 83–108)
POTASSIUM SERPL-MCNC: 3.4 MMOL/L — LOW (ref 3.5–5.3)
POTASSIUM SERPL-MCNC: 4 MMOL/L — SIGNIFICANT CHANGE UP (ref 3.5–5.3)
POTASSIUM SERPL-SCNC: 3.4 MMOL/L — LOW (ref 3.5–5.3)
POTASSIUM SERPL-SCNC: 4 MMOL/L — SIGNIFICANT CHANGE UP (ref 3.5–5.3)
PROT SERPL-MCNC: 5.8 G/DL — LOW (ref 6–8.3)
PROT SERPL-MCNC: 7 G/DL — SIGNIFICANT CHANGE UP (ref 6–8.3)
PROT UR-MCNC: 100 MG/DL
PROTHROM AB SERPL-ACNC: 25.7 SEC — HIGH (ref 9.8–12.7)
PROTHROM AB SERPL-ACNC: 25.8 SEC — HIGH (ref 9.8–12.7)
RBC # BLD: 3.39 M/UL — LOW (ref 4.2–5.8)
RBC # BLD: 4.12 M/UL — LOW (ref 4.2–5.8)
RBC # FLD: 13.7 % — SIGNIFICANT CHANGE UP (ref 10.3–16.9)
RBC # FLD: 13.9 % — SIGNIFICANT CHANGE UP (ref 10.3–16.9)
SAO2 % BLDA: 100 % — SIGNIFICANT CHANGE UP (ref 95–100)
SAO2 % BLDA: 22 % — LOW (ref 95–100)
SODIUM SERPL-SCNC: 141 MMOL/L — SIGNIFICANT CHANGE UP (ref 135–145)
SODIUM SERPL-SCNC: 142 MMOL/L — SIGNIFICANT CHANGE UP (ref 135–145)
SP GR SPEC: 1.02 — SIGNIFICANT CHANGE UP (ref 1–1.03)
TROPONIN T SERPL-MCNC: 0.02 NG/ML — HIGH (ref 0–0.01)
TROPONIN T SERPL-MCNC: 0.04 NG/ML — CRITICAL HIGH (ref 0–0.01)
UROBILINOGEN FLD QL: 0.2 E.U./DL — SIGNIFICANT CHANGE UP
WBC # BLD: 11 K/UL — HIGH (ref 3.8–10.5)
WBC # BLD: 11.3 K/UL — HIGH (ref 3.8–10.5)
WBC # FLD AUTO: 11 K/UL — HIGH (ref 3.8–10.5)
WBC # FLD AUTO: 11.3 K/UL — HIGH (ref 3.8–10.5)

## 2018-08-28 PROCEDURE — 99291 CRITICAL CARE FIRST HOUR: CPT

## 2018-08-28 PROCEDURE — 71045 X-RAY EXAM CHEST 1 VIEW: CPT | Mod: 26

## 2018-08-28 PROCEDURE — 93010 ELECTROCARDIOGRAM REPORT: CPT

## 2018-08-28 PROCEDURE — 99233 SBSQ HOSP IP/OBS HIGH 50: CPT

## 2018-08-28 RX ORDER — THIAMINE MONONITRATE (VIT B1) 100 MG
100 TABLET ORAL DAILY
Qty: 0 | Refills: 0 | Status: DISCONTINUED | OUTPATIENT
Start: 2018-08-28 | End: 2018-08-28

## 2018-08-28 RX ORDER — ACETAMINOPHEN 500 MG
650 TABLET ORAL EVERY 6 HOURS
Qty: 0 | Refills: 0 | Status: DISCONTINUED | OUTPATIENT
Start: 2018-08-28 | End: 2018-09-06

## 2018-08-28 RX ORDER — POTASSIUM CHLORIDE 20 MEQ
10 PACKET (EA) ORAL
Qty: 0 | Refills: 0 | Status: DISCONTINUED | OUTPATIENT
Start: 2018-08-28 | End: 2018-08-28

## 2018-08-28 RX ORDER — POTASSIUM CHLORIDE 20 MEQ
10 PACKET (EA) ORAL
Qty: 0 | Refills: 0 | Status: COMPLETED | OUTPATIENT
Start: 2018-08-28 | End: 2018-08-28

## 2018-08-28 RX ORDER — THIAMINE MONONITRATE (VIT B1) 100 MG
500 TABLET ORAL EVERY 8 HOURS
Qty: 0 | Refills: 0 | Status: DISCONTINUED | OUTPATIENT
Start: 2018-08-28 | End: 2018-08-28

## 2018-08-28 RX ORDER — MAGNESIUM SULFATE 500 MG/ML
1 VIAL (ML) INJECTION ONCE
Qty: 0 | Refills: 0 | Status: COMPLETED | OUTPATIENT
Start: 2018-08-28 | End: 2018-08-28

## 2018-08-28 RX ORDER — SODIUM CHLORIDE 9 MG/ML
1000 INJECTION, SOLUTION INTRAVENOUS
Qty: 0 | Refills: 0 | Status: DISCONTINUED | OUTPATIENT
Start: 2018-08-28 | End: 2018-08-29

## 2018-08-28 RX ORDER — METOPROLOL TARTRATE 50 MG
5 TABLET ORAL EVERY 6 HOURS
Qty: 0 | Refills: 0 | Status: DISCONTINUED | OUTPATIENT
Start: 2018-08-28 | End: 2018-08-28

## 2018-08-28 RX ORDER — IPRATROPIUM/ALBUTEROL SULFATE 18-103MCG
3 AEROSOL WITH ADAPTER (GRAM) INHALATION ONCE
Qty: 0 | Refills: 0 | Status: COMPLETED | OUTPATIENT
Start: 2018-08-28 | End: 2018-08-29

## 2018-08-28 RX ORDER — FOLIC ACID 0.8 MG
1 TABLET ORAL DAILY
Qty: 0 | Refills: 0 | Status: DISCONTINUED | OUTPATIENT
Start: 2018-08-28 | End: 2018-08-31

## 2018-08-28 RX ORDER — METOPROLOL TARTRATE 50 MG
5 TABLET ORAL EVERY 6 HOURS
Qty: 0 | Refills: 0 | Status: DISCONTINUED | OUTPATIENT
Start: 2018-08-28 | End: 2018-08-29

## 2018-08-28 RX ORDER — METOPROLOL TARTRATE 50 MG
5 TABLET ORAL ONCE
Qty: 0 | Refills: 0 | Status: DISCONTINUED | OUTPATIENT
Start: 2018-08-28 | End: 2018-08-28

## 2018-08-28 RX ORDER — VANCOMYCIN HCL 1 G
1250 VIAL (EA) INTRAVENOUS EVERY 24 HOURS
Qty: 0 | Refills: 0 | Status: DISCONTINUED | OUTPATIENT
Start: 2018-08-28 | End: 2018-08-28

## 2018-08-28 RX ORDER — SALIVA SUBSTITUTE COMB NO.11 351 MG
5 POWDER IN PACKET (EA) MUCOUS MEMBRANE DAILY
Qty: 0 | Refills: 0 | Status: DISCONTINUED | OUTPATIENT
Start: 2018-08-28 | End: 2018-09-07

## 2018-08-28 RX ORDER — ACETAMINOPHEN 500 MG
1000 TABLET ORAL ONCE
Qty: 0 | Refills: 0 | Status: DISCONTINUED | OUTPATIENT
Start: 2018-08-28 | End: 2018-08-28

## 2018-08-28 RX ORDER — PIPERACILLIN AND TAZOBACTAM 4; .5 G/20ML; G/20ML
3.38 INJECTION, POWDER, LYOPHILIZED, FOR SOLUTION INTRAVENOUS EVERY 6 HOURS
Qty: 0 | Refills: 0 | Status: DISCONTINUED | OUTPATIENT
Start: 2018-08-28 | End: 2018-08-29

## 2018-08-28 RX ORDER — THIAMINE MONONITRATE (VIT B1) 100 MG
500 TABLET ORAL THREE TIMES A DAY
Qty: 0 | Refills: 0 | Status: DISCONTINUED | OUTPATIENT
Start: 2018-08-28 | End: 2018-08-30

## 2018-08-28 RX ADMIN — PIPERACILLIN AND TAZOBACTAM 200 GRAM(S): 4; .5 INJECTION, POWDER, LYOPHILIZED, FOR SOLUTION INTRAVENOUS at 12:30

## 2018-08-28 RX ADMIN — Medication 100 MILLIEQUIVALENT(S): at 21:45

## 2018-08-28 RX ADMIN — Medication 100 MILLIEQUIVALENT(S): at 18:48

## 2018-08-28 RX ADMIN — CARBIDOPA AND LEVODOPA 1 TABLET(S): 25; 100 TABLET ORAL at 05:02

## 2018-08-28 RX ADMIN — Medication 100 GRAM(S): at 15:59

## 2018-08-28 RX ADMIN — Medication 200 MILLIGRAM(S): at 05:02

## 2018-08-28 RX ADMIN — Medication 166.67 MILLIGRAM(S): at 12:45

## 2018-08-28 RX ADMIN — Medication 1 APPLICATION(S): at 12:00

## 2018-08-28 RX ADMIN — AMLODIPINE BESYLATE 5 MILLIGRAM(S): 2.5 TABLET ORAL at 05:02

## 2018-08-28 RX ADMIN — Medication 100 MILLIEQUIVALENT(S): at 23:29

## 2018-08-28 RX ADMIN — PIPERACILLIN AND TAZOBACTAM 200 GRAM(S): 4; .5 INJECTION, POWDER, LYOPHILIZED, FOR SOLUTION INTRAVENOUS at 17:31

## 2018-08-28 RX ADMIN — SODIUM CHLORIDE 100 MILLILITER(S): 9 INJECTION, SOLUTION INTRAVENOUS at 18:49

## 2018-08-28 RX ADMIN — SODIUM CHLORIDE 100 MILLILITER(S): 9 INJECTION, SOLUTION INTRAVENOUS at 05:03

## 2018-08-28 RX ADMIN — Medication 1 MILLIGRAM(S): at 14:22

## 2018-08-28 RX ADMIN — Medication 100 MILLIEQUIVALENT(S): at 17:08

## 2018-08-28 RX ADMIN — Medication 100 MILLIEQUIVALENT(S): at 20:00

## 2018-08-28 NOTE — PROGRESS NOTE ADULT - PROBLEM SELECTOR PLAN 1
RESOLVED. Patient hypothermic and tachycardic on presentation with elevated lactate and TME.   -S/p 3L NS in ED- appropriately fluid resuscitated  -Lactate Cleared  -UTI POA possible source, being treated with Ceftriaxone 1g q24h last dose on 27th.  - UCx negative x5 days  - BCx negative x 5 days

## 2018-08-28 NOTE — PROGRESS NOTE ADULT - SUBJECTIVE AND OBJECTIVE BOX
SADIA LUTZ   [id number]     (1927):   CC: confusion  HPI:  89M with Parkinson's (on Sinemet), Afib (Toprol XL and Coumadin), HTN, BPH and ETOH abuse who was BIBEMS after being found down at home. Wife was out of town caring for another sick family member.  He was alone and probably fell 24-48 hour earlier.  On admission was in renal failure and found to have a UTI.  Toxic screen was negative for drugs or alcohol.    Subjective:   and events of today-  wife texted me at 12:05 PM saying patient was complaining of being col and seemed to be shivering.  was also complaining of chest pain..  Was being evaluated by the nurse.  Apparently may then have had swallow eval, and a rapid response was called at around 12:30.  Found to be in afib, to have Temp of 100.4 and respiratory distress.  Presumed aspiration, continued confusion, but remained alert and talking during the rapid response.  Treated for possible sepsis and transferred to the ICU.  Met with patients wife discussing treatment decisions.  Given his high level of function just 10 days ago she would like us to continue interventions including intubation, at this time    Wife also reports patient had several episodes of diarrhea yesterday      ROS:    Unable to obtain due to: patient is unreliable and inconsistent historian,  but presently denies pain, but states he doesn't feel well.                    Dyspnea (Carissa 0-10):    doesn't really answer                    N/V (Y/N):                N              Secretions (Y/N) :    N            Agitation(Y/N):        N  Pain (Y/N):     no  -Provocation/Palliation:  -Quality/Quantity:  -Radiating:  -Severity:  -Timing/Frequency:  -Impact on ADLs:    unable to complete ROS due to severity of acute illness    Allergies    aspirin (Unknown)    Intolerances        Opiate Naive (Y/N): yes, med list obtained from patient's pharmacy      Medications:      MEDICATIONS  (STANDING):  BACItracin   Ointment 1 Application(s) Topical daily  carbidopa/levodopa  25/100 1 Tablet(s) Oral three times a day  folic acid Injectable 1 milliGRAM(s) IV Push daily  magnesium sulfate  IVPB 1 Gram(s) IV Intermittent once  metoprolol tartrate Injectable 5 milliGRAM(s) IV Push every 6 hours  piperacillin/tazobactam IVPB. 3.375 Gram(s) IV Intermittent every 6 hours  potassium chloride  10 mEq/100 mL IVPB 10 milliEquivalent(s) IV Intermittent every 1 hour  potassium chloride  10 mEq/100 mL IVPB 10 milliEquivalent(s) IV Intermittent every 1 hour  thiamine IVPB 500 milliGRAM(s) IV Intermittent three times a day    MEDICATIONS  (PRN):  acetaminophen  Suppository 650 milliGRAM(s) Rectal every 6 hours PRN For Temp greater than 38 C (100.4 F)              Labs:                                      10.6   11.0  )-----------( 142      ( 28 Aug 2018 14:25 )             32.8       141  |  109<H>  |  28<H>  ----------------------------<  140<H>  3.4<L>   |  17<L>  |  1.58<H>    Ca    8.1<L>      28 Aug 2018 14:25  Phos  3.4       Mg     1.8         TPro  5.8<L>  /  Alb  2.7<L>  /  TBili  0.8  /  DBili  x   /  AST  24  /  ALT  <5<L>  /  AlkPhos  54                                     Imaging:  Reviewed   < from: CT Head No Cont (18 @ 11:24) >  IMPRESSION:  1. No CT evidence of acute intracranial hemorrhage.  2. 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  3. No significant change compared to the prior study of 8/3/2018.    < end of copied text >      < from: CT Abdomen and Pelvis No Cont (18 @ 11:58) >  IMPRESSION:    No acute thoracic or abdominopelvic pathology.    < end of copied text >      < from: CT Cervical Spine No Cont (18 @ 11:59) >  mpression: Mild degenerative changes of the cervical spine with no   evidence of acute fractures.    < end of copied text >    < from: Xray Chest 1 View-PORTABLE IMMEDIATE (18 @ 12:47) >    EXAM:  XR CHEST PORTABLE IMMED 1V                          PROCEDURE DATE:  2018          INTERPRETATION:    INDICATION: rigors, febrile 100.8    TECHNIQUE: Chest, single portable AP view on 2018 12:47 PM     COMPARISON: 2018    FINDINGS:   The enteric feeding tube has been removed.  There is a bandlike opacity the left lung base, consistent with discoid   atelectasis.  No evidence of focal airspace consolidation in either lung.    Costophrenic angles are grossly sharp bilaterally, without sizable   pleural effusions.   No pneumothorax is seen.  Cardiomediastinal silhouette is grossly stable in appearance.   Visualized bony thorax is unchanged in appearance.       IMPRESSION:   Discoid atelectasis at the left lung base.    Stable cardiomediastinal silhouette.    < end of copied text >      PEx:  Vital Signs Last 24 Hrs  T(C): 37.7 (28 Aug 2018 14:29), Max: 38.6 (28 Aug 2018 13:01)  T(F): 99.8 (28 Aug 2018 14:29), Max: 101.4 (28 Aug 2018 13:01)  HR: 110 (28 Aug 2018 15:00) (77 - 143)  BP: 122/82 (28 Aug 2018 15:00) (72/52 - 141/72)  BP(mean): 95 (28 Aug 2018 15:00) (58 - 95)  RR: 22 (28 Aug 2018 14:00) (18 - 30)  SpO2: 100% (28 Aug 2018 15:00) (93% - 100%)        General: elderly plethoric gentleman in NAD, sometimes difficult to understand secondary to dry mouth.    HEENT:  non-icteric sclera, multiple healing  bruises abrasion on chin and both cheeks, dry mucous membranes on non-rebrreather mask  Neck: no adenopathy  CVS: irreg  distant, no murmur heard tachycardia  Resp: decreased BS both bases- just been suctioned  GI:  protuberant, normal BS, non-tender  :  normal  Musc:   equal strength, no significant wasting, no joint erythema or effusion  Neuro: non focal, oriented to person   Psych:   confused but cooperative  Skin: diffuse actinic keratoses and evidence of sun damage, multiple abrasions including both knees  Lymph:no adenopathy  Preadmit Karnofsky:  	                    presumed 60-70%           Current Karnofsky:     30%  Cachexia (Y/N): no  BMI:    Advanced Directives:     Full Code      Decision maker: Patient presently not able to make complex medical decisions  Legal surrogate: wife Angle Lutz    Social History: .  Wife is about 20 years younger.  He was a , she was an ad  for the [employer], no Children.  He is [place]  Patient and wife are residents of [facility]    GOALS OF CARE DISCUSSION             Documentation of GOC: to recover from this event, to go to rehab.  Wife presently overwhelmed regarding family illness  Hoping for improvement in strength and mental status. See discussion above under subjective	          REFERRALS	        Palliative Med               PT/OT

## 2018-08-28 NOTE — PROVIDER CONTACT NOTE (CHANGE IN STATUS NOTIFICATION) - BACKGROUND
Patient has a history of afib, admitted on 8/21 for sepsis d/t UTI. Patient had chan removed 8/27 and failed TTV. New chan inserted 828.

## 2018-08-28 NOTE — PROGRESS NOTE ADULT - PROBLEM SELECTOR PLAN 3
- in setting of dehydration, obstruction from BPH, underlying CKD 3  - BUN, Cr and BUN:Cr ratio have normalized as of today 8/27  - Patient with Good in situ  - failed TOV due to urinary retention overnight on 8/27/18 and nurse replaced Good    #BPH  - continue home tamsulosin 0.4 mg daily  - continue home finasteride    #Rhabdomyolysis  - questionable, relatively mild CPK elevation, RESOLVED

## 2018-08-28 NOTE — PROGRESS NOTE ADULT - PROBLEM SELECTOR PLAN 4
Pt has hx of afib on Coumadin and Toprol XL 200mg at home.   - continue home Coumadin  - daily INR and dose Coumadin  - continue Toprol xl 200 BID

## 2018-08-28 NOTE — PROGRESS NOTE ADULT - PROBLEM SELECTOR PLAN 6
Before rapid response called patient was complaining to his wife of chest pain.  to evaluate for EKG cghanges, checking troponins,.  On coumadin with therapeutic INR all except 1-2 days, so PE less likely, but still should be considered

## 2018-08-28 NOTE — PROGRESS NOTE ADULT - ASSESSMENT
transferred from medicine to MICU. Pt just completed course of rocephin for UTI after being found down in his home, was getting better but today found to be hypotensive and having rigors. Transferred to MICU for closer monitoring.

## 2018-08-28 NOTE — PROGRESS NOTE ADULT - PROBLEM SELECTOR PLAN 10
1) PCP Contacted on Admission: (Y/N) --> Name & Phone #: Dr. Reji Paulino  2) Date of Contact with PCP:  3) PCP Contacted at Discharge: (Y/N, N/A)  4) Summary of Handoff Given to PCP:   5) Post-Discharge Appointment Date and Location:

## 2018-08-28 NOTE — PROGRESS NOTE ADULT - PROBLEM SELECTOR PLAN 5
Fever, respiratory distress likely with aspiration during swallowing trial perhaps.  Attempt to control or treat delirium, continue Parkinsons meds

## 2018-08-28 NOTE — PROGRESS NOTE ADULT - ASSESSMENT
89 y/o M with hx of Parkinson disease, HTN, BPH, ETOH abuse, afib on Coumadin, found down at home for unknown period of time (up to 2 days) admitted for toxic metabolic encephalopathy and acute renal failure and found to have UTI. 89 y/o M with hx of Parkinson disease, HTN, BPH, ETOH abuse, afib on Coumadin, found down at home for unknown period of time (up to 2 days) admitted for toxic metabolic encephalopathy and acute renal failure and found to have UTI.    Infectious Disease  # Sepsis  - UTI POA possible source, being treated with Ceftriaxone 1g q24h last dose on 27th.  - previous urine and blood cultures have negative since pt was treated with rocephin  - pt had episode of hypotension today SBP in the 70's, s/p fluid bolus now normotensive  - f/u repeat urine and blood culture  - IVF lactated ringers at 100cc/hr  - zosyn for abx coverage    Pulmonology  # Acute respiratory distress  - CXR + for atelectasis   - ABG +met acidosis with resp alkalosis  - saturation stable on high flow NC  - pulm exam +rhonchi, possible aspiration 2/2 AMS  - will cont with oxygen supplementation and pulmonary toilet.     Neurology  # Toxic metabolic encephalopathy  - pt originally presented with UTI in AMS, which was resolving until today  - currently A&Ox1 to person only. He is difficult to understand on top of his confusion, speaking in a mumbling fashion  - dry mouth may be contributing to mumbling. Will give oral care for dry mouth  - AMS likely 2/2 sepsis, will cont to monitor and treat underlying disease    # Parkinson's disease.    - c/w home Sinemet    Renal  # Acute renal failure   - in setting of dehydration, obstruction from BPH, underlying CKD 3  - Cr levels were normalizing yesterday Cr 1.0, today 1.5  - f/u urine lytes  - cont IVF and trend BMP's      #BPH  - continue home tamsulosin 0.4 mg daily  - continue home finasteride      Cardiology  # Chronic atrial fibrillation.   - h/o of a-fib on home Coumadin and Toprol XL 200mg  - on lopressor for rate control. Pt has been mildy tachy in 120's to 90's  - INR was elevated d/t coumadin, will transition to hep gtt once INR <2.0  - f/u coags and start heparin when INR <2.0    # Decondition due to acute illness  - PT recommends ALBIN  - appreciate PT recs  - appreciate SW, CC assistance with placement  - appreciate palliative assistance.       # Essential hypertension.   - hold home amlodipine 5mg  - continue to hold home benazepril until NILS and hypotension fully resolved.    Psychiatry   # Alcohol abuse  - CIWA consistently negative; last drink >72 hours prior to admission        Heme/Onc  # Thrombocytopenia  - likely in setting of etoh abuse  - trend CBC    # Anemia   - stable  - follow CBC.

## 2018-08-28 NOTE — PROGRESS NOTE ADULT - PROBLEM SELECTOR PLAN 9
F: LR @125  E: Replete PRN  N: Pureed, no liquids; continue folic acid, thiamine, multivitamin due to EtOH hx  PPX: Holding HSQ in setting of increased INR  Code Status: FULL CODE  Dispo: [facility] pending approval for ALBIN

## 2018-08-28 NOTE — PROGRESS NOTE ADULT - PROBLEM SELECTOR PLAN 5
- c/w home Sinemet    # Decondition due to acute illness  - PT recommends ALBIN  - appreciate PT recs  - appreciate SW, CC assistance with placement  - appreciate palliative assistance

## 2018-08-28 NOTE — PROGRESS NOTE ADULT - PROBLEM SELECTOR PLAN 4
has been back on Sinemet for several days.  tremor persists.  Aspiration may in part be due to Parkinsons, but also due to derlirium.  Continue Sinement

## 2018-08-28 NOTE — PROGRESS NOTE ADULT - PROBLEM SELECTOR PLAN 2
RESOLVING. Most likely in setting of ARF + Sepsis, and missed Sinemet dose(s).    #Hypernatremia  RESOLVED  - pt found to be hypernatremic to 153 (8/23).   - sodium has gradually trended down s/p gentle D5 1/2 NS  - today Na is 142 (8/28)  - continue maintenance IVF until tolerating good PO

## 2018-08-28 NOTE — PROVIDER CONTACT NOTE (CHANGE IN STATUS NOTIFICATION) - SITUATION
RN alerted by speech and swallow that patient coughing more than usual and that the wife was concerned about him shaking. Upon entering room, patient find rigoring. VS taken and MD alerted.

## 2018-08-28 NOTE — PROGRESS NOTE ADULT - SUBJECTIVE AND OBJECTIVE BOX
INTERVAL HPI/OVERNIGHT EVENTS: transferred from medicine to MICU. Pt just completed course of rocephin for UTI after being found down in his home, was getting better but today found to be hypotensive and having rigors. Transferred to MICU for closer monitoring.     SUBJECTIVE: Patient seen and examined at bedside. Pt is A&Ox1, pleasantly confused. He was unable to say what type of building he was in or what date it was. He did know his full name. He denied headache, chills, chest pain, SOB, n/v/c/d, abd pain, numbness, or tingling.     CONSTITUTIONAL: No weakness, fevers or chills  EYES/ENT: No visual changes;  No vertigo or throat pain   NECK: No pain or stiffness  RESPIRATORY: No cough, wheezing, hemoptysis; No shortness of breath  CARDIOVASCULAR: No chest pain or palpitations  GASTROINTESTINAL: No abdominal or epigastric pain. No nausea, vomiting, or hematemesis; No diarrhea or constipation. No melena or hematochezia.  GENITOURINARY: No dysuria, frequency or hematuria  NEUROLOGICAL: No numbness or weakness  SKIN: No itching, rashes    OBJECTIVE:    VITAL SIGNS:  ICU Vital Signs Last 24 Hrs  T(C): 37.7 (28 Aug 2018 14:29), Max: 38.6 (28 Aug 2018 13:01)  T(F): 99.8 (28 Aug 2018 14:29), Max: 101.4 (28 Aug 2018 13:01)  HR: 110 (28 Aug 2018 15:00) (77 - 143)  BP: 122/82 (28 Aug 2018 15:00) (72/52 - 141/72)  BP(mean): 95 (28 Aug 2018 15:00) (58 - 95)  ABP: --  ABP(mean): --  RR: 22 (28 Aug 2018 14:00) (18 - 30)  SpO2: 100% (28 Aug 2018 15:00) (93% - 100%)         @ 07:01  -   @ 07:00  --------------------------------------------------------  IN: 0 mL / OUT: 950 mL / NET: -950 mL     @ 07: @ 15:23  --------------------------------------------------------  IN: 1850 mL / OUT: 230 mL / NET: 1620 mL      CAPILLARY BLOOD GLUCOSE      POCT Blood Glucose.: 99 mg/dL (28 Aug 2018 12:17)      PHYSICAL EXAM:    General: NAD  HEENT: NC/AT; PERRL, clear conjunctiva  Neck: supple  Respiratory: rhonchia b/l, good air flow.   Cardiovascular: +S1/S2, irregular rate.   Abdomen: soft, NT/ND; +BS x4  Extremities: WWP, 2+ peripheral pulses b/l; no LE edema  Skin: normal color and turgor; no rash  Neurological: A&Ox1. confused. moving all limbs spontaneously.     MEDICATIONS:  MEDICATIONS  (STANDING):  BACItracin   Ointment 1 Application(s) Topical daily  carbidopa/levodopa  25/100 1 Tablet(s) Oral three times a day  folic acid Injectable 1 milliGRAM(s) IV Push daily  magnesium sulfate  IVPB 1 Gram(s) IV Intermittent once  metoprolol tartrate Injectable 5 milliGRAM(s) IV Push every 6 hours  piperacillin/tazobactam IVPB. 3.375 Gram(s) IV Intermittent every 6 hours  potassium chloride  10 mEq/100 mL IVPB 10 milliEquivalent(s) IV Intermittent every 1 hour  potassium chloride  10 mEq/100 mL IVPB 10 milliEquivalent(s) IV Intermittent every 1 hour  thiamine IVPB 500 milliGRAM(s) IV Intermittent three times a day    MEDICATIONS  (PRN):  acetaminophen  Suppository 650 milliGRAM(s) Rectal every 6 hours PRN For Temp greater than 38 C (100.4 F)      ALLERGIES:  Allergies    aspirin (Unknown)    Intolerances        LABS:                        10.6   11.0  )-----------( 142      ( 28 Aug 2018 14:25 )             32.8         141  |  109<H>  |  28<H>  ----------------------------<  140<H>  3.4<L>   |  17<L>  |  1.58<H>    Ca    8.1<L>      28 Aug 2018 14:25  Phos  3.4       Mg     1.8         TPro  5.8<L>  /  Alb  2.7<L>  /  TBili  0.8  /  DBili  x   /  AST  24  /  ALT  <5<L>  /  AlkPhos  54      PT/INR - ( 28 Aug 2018 12:43 )   PT: 25.7 sec;   INR: 2.28          PTT - ( 28 Aug 2018 12:43 )  PTT:38.8 sec  Urinalysis Basic - ( 28 Aug 2018 13:54 )    Color: Yellow / Appearance: Cloudy / S.025 / pH: x  Gluc: x / Ketone: 15 mg/dL  / Bili: Negative / Urobili: 0.2 E.U./dL   Blood: x / Protein: 100 mg/dL / Nitrite: NEGATIVE   Leuk Esterase: Small / RBC: Many /HPF / WBC 5-10 /HPF   Sq Epi: x / Non Sq Epi: Moderate /HPF / Bacteria: Present /HPF        RADIOLOGY & ADDITIONAL TESTS: Reviewed.

## 2018-08-28 NOTE — PROGRESS NOTE ADULT - PROBLEM SELECTOR PLAN 3
Remains confused.  treated with vitamins and fluids.  Slowly improving. As discussed with Dr. Barker to get high does thiamine

## 2018-08-28 NOTE — PROGRESS NOTE ADULT - PROBLEM SELECTOR PLAN 7
Discussed at length with patients wife-  she is greatly overwhelmed by family circumstance.  Explanation of medical situation.  Support provided  Now in ICU with multiple problems, even though on paper he seemed to be improving.Prognosis much more guarded at this time.  Wife fully aware.  She would like all treatments at this time, but says other decisions may be necessary if there is worsening.  Urge ICU team to continue conversations    Support provided to patient and family. Patient to have access to supportive services during rest of hospital stay as the patient/family deemed necessary ie. Chaplaincy, Massage therapy, Music therapy, Patient and family supportive services, Palliative SW, etc.  Pall  continues to work with wife  As discussed during the palliative IDT meeting and as identified during the patients PSSA screening the patient would benefit from: support for wife.  Will need ALBIN placement with expectation to return home

## 2018-08-28 NOTE — PROVIDER CONTACT NOTE (CHANGE IN STATUS NOTIFICATION) - ACTION/TREATMENT ORDERED:
Fluids and Iv tyelnol admin, IV anitbiotcs, chest xray taken. Patient transferred to ICU. report given to JIM Salas

## 2018-08-28 NOTE — PROVIDER CONTACT NOTE (CHANGE IN STATUS NOTIFICATION) - ASSESSMENT
upon entering room and pt found rigoring but alert and responding to questions (patient A&0x2 at baseline). /72. Oral temp: 98.0 HR: and 02 sat not reading accuratly. pt placed on norebreather: sating 97% HR in 150s. Rectal temp: 100.8

## 2018-08-28 NOTE — PROGRESS NOTE ADULT - SUBJECTIVE AND OBJECTIVE BOX
Physical Medicine and Rehabilitation Progress Note:    Patient is a 89y old  Male who presents with a chief complaint of Toxic Metabolic Encephalopathy, Acute Renal Failure, (21 Aug 2018 16:56)      HPI:  89M with Parkinson's (on Sinemet), Afib (Toprol XL and Coumadin), HTN, BPH and ETOH abuse who was BIBEMS after being found down at home. Patient was found face down on the floor by his cleaning lady today w/ periorbital swelling and bruising to bilateral cheeks. As per wife patient was last seen well 5 days PTA but she spoke to him on the phone 2 days PTA. Patient was altered on presentation an unable to provide history.     At [facility] T 96.5,  in Afib, /91, RR18 [id number]%. Labs showed BUN/Cr significantly elevated from baseline. Minor trop elevation. UA positive for LE, WBC, Nitrite, and Bacteria. Patient was given Ceftriaxone, 0.5mg Ativan due to tremor, and 2LNS. He was started on bicarb drip and transferred to [facility].     On arrival to [facility] patient minimally responsive. He affirmed to chills, denied fever. Affirmed to nausea. Denied vomiting, abd pain, changes in stool, blood in stool. He denies changes in urinary habits. (21 Aug 2018 16:56)                            10.6   11.0  )-----------( 142      ( 28 Aug 2018 14:25 )             32.8       08-28    141  |  109<H>  |  28<H>  ----------------------------<  140<H>  3.4<L>   |  17<L>  |  1.58<H>    Ca    8.1<L>      28 Aug 2018 14:25  Phos  3.4     08-28  Mg     1.8     08-28    TPro  5.8<L>  /  Alb  2.7<L>  /  TBili  0.8  /  DBili  x   /  AST  24  /  ALT  <5<L>  /  AlkPhos  54  08-28    Vital Signs Last 24 Hrs  T(C): 37.7 (28 Aug 2018 14:29), Max: 38.6 (28 Aug 2018 13:01)  T(F): 99.8 (28 Aug 2018 14:29), Max: 101.4 (28 Aug 2018 13:01)  HR: 86 (28 Aug 2018 16:00) (77 - 143)  BP: 98/58 (28 Aug 2018 16:00) (72/52 - 141/72)  BP(mean): 78 (28 Aug 2018 16:00) (58 - 95)  RR: 20 (28 Aug 2018 16:00) (18 - 30)  SpO2: 100% (28 Aug 2018 16:00) (93% - 100%)    MEDICATIONS  (STANDING):  BACItracin   Ointment 1 Application(s) Topical daily  carbidopa/levodopa  25/100 1 Tablet(s) Oral three times a day  folic acid Injectable 1 milliGRAM(s) IV Push daily  metoprolol tartrate Injectable 5 milliGRAM(s) IV Push every 6 hours  piperacillin/tazobactam IVPB. 3.375 Gram(s) IV Intermittent every 6 hours  potassium chloride  10 mEq/100 mL IVPB 10 milliEquivalent(s) IV Intermittent every 1 hour  potassium chloride  10 mEq/100 mL IVPB 10 milliEquivalent(s) IV Intermittent every 1 hour  thiamine IVPB 500 milliGRAM(s) IV Intermittent three times a day    MEDICATIONS  (PRN):  acetaminophen  Suppository 650 milliGRAM(s) Rectal every 6 hours PRN For Temp greater than 38 C (100.4 F)    Currently Undergoing Physical Therapy at bedside.    Functional Status Assessment: on 8/27/2018    Previous Level of Function:     · Ambulation Skills	independent	  · Transfer Skills	independent	  · ADL Skills	independent	  · Work/Leisure Activity	independent	  · Additional Comments	Patient lives in elevator building with no steps to enter. As per spouse present, patient with no DME use PTA.	    Cognitive Status Examination:   · Orientation	person; place	  · Level of Consciousness	confused; lethargic/somnolent	  · Follows Commands and Answers Questions	able to follow single-step instructions	    Peripheral Neurovascular:     Peripheral Neurovascular:   · Peripheral Neurovascular	WDL	  · Capillary Refill General	less than/equal to 3 secs	    Range of Motion Exam:   · Range of Motion Examination	no ROM deficits were identified	    Bed Mobility: Sit to Supine:     · Level of [place]	maximum assist (25% patients effort)	  · Physical Assist/Nonphysical Assist	1 person assist	  · Assistive Device	bed rails	    Bed Mobility: Supine to Sit:     · Level of [place]	maximum assist (25% patients effort)	  · Physical Assist/Nonphysical Assist	1 person assist; verbal cues; nonverbal cues (demo/gestures)	  · Assistive Device	bed rails	    Transfer: Sit to Stand:     · Level of [place]	moderate assist (50% patients effort); maximum assist (25% patients effort)	  · Physical Assist/Nonphysical Assist	2 person assist; CNA assisted	  · Assistive Device	HHA x 2 b/l	    Transfer: Stand to Sit:     · Level of [place]	moderate assist (50% patients effort)	  · Physical Assist/Nonphysical Assist	2 person assist	  · Assistive Device	HHA x 2 b/l	    Gait Skills:     · Level of [place]	moderate assist (50% patients effort); maximum assist (25% patients effort)	  · Physical Assist/Nonphysical Assist	2 person assist; nonverbal cues (demo/gestures); verbal cues	  · Assistive Device	HHA x 2 b/l	  · Gait Distance	5 side steps to head of bed	    Gait Analysis:     · Gait Pattern Used	3-point gait	  · Gait Deviations Noted	decreased [place]; decreased step length; decreased weight-shifting ability; shuffle steps	  · Impairments Contributing to Gait Deviations	impaired balance; cognition; impaired postural control; decreased strength	    Stair Negotiation:     · Level of [place]	unable to perform	    Sensory Examination:    Sensory Examination:    Grossly Intact:   · Gross Sensory Examination	Grossly Intact	      Light Touch Sensation:   · Left UE	within normal limits	  · Right UE	within normal limits	  · Left LE	within normal limits	  · Right LE	within normal limits	      Clinical Impressions:   · Criteria for Skilled Therapeutic Interventions	impairments found; functional limitations in following categories; risk reduction/prevention; rehab potential; therapy frequency; anticipated discharge recommendation; anticipated equipment needs at discharge; predicted duration of therapy intervention	  · Impairments Found (describe specific impairments)	aerobic capacity/endurance; gait, locomotion, and balance; cognitive impairment; ergonomics and body mechanics; muscle strength; posture	  · Functional Limitations in Following Categories (describe specific limitations)	self-care; home management; community/leisure	  · Risk Reduction/Prevention (Describe Specific Areas of risk reduction/prevention)	risk factors	  · Risk Areas	fall; safety awareness	  · Rehab Potential	good, to achieve stated therapy goals	  · Therapy Frequency	2-3x/week	  · Predicted Duration of Therapy Intervention (days/wks)	7 days +	            PM&R Impression: as above    Disposition Plan Recommendations:  subacute rehab placement

## 2018-08-28 NOTE — CHART NOTE - NSCHARTNOTEFT_GEN_A_CORE
PGY-3 EVENT/TRANSFER NOTE    89 y/o M with atrial fibrillation (on Toprol XL and Coumadin), HTN, Parkinson's (on Sinemet), BPH, and ETOH abuse, initially BIBEMS to [facility] on 8/21 after being found down at home. Patient was found face down on the floor by his cleaning lady with bruising on his cheeks. Patient last seen well five days prior. Patient was altered on presentation an unable to provide history. At [facility] ED T 96.5,  in Afib, /91, RR18, O2 100%. Labs showed BUN/Cr significantly elevated from baseline. Minor trop elevation. UA positive for LE, WBC, nitrites, and bacteria. Patient was given Ceftriaxone, 0.5mg Ativan due to tremor, and 2LNS. He was started on bicarb drip and admitted to [facility] for toxic metabolic encephalopathy and sepsis 2/2 UTI.     Course was complicated by afib with RVR to 160s due to missed toprol doses. However this resolved with lopressor IV. On 8/23 patient was assessed by speech and swallow and recommended NPO. NGT was placed for medication administration and Sinemet was restarted. Toprol XL unable to be crushed, so patient was switched to lopressor 100mg BID with HR normalizing. Pt also found to be hypernatremic to 153. Pt was switched to D5 with resolution of hypernatremia. During course on [facility], mental status continued to improve to baseline. Patient finished a seven-day course of ceftriaxone for UTI, although organisms were never isolated on blood cultures or urine cultures. SW started planning for ALBIN.    On 8/28, was called to bedside for rigors. Patient was awake and answering questions, without any focal complaints. VS T 100.8 rectal, HR 130s, /70, O2 sat 80s on ambient air. Patient was placed on nasal cannula, then to NRB with improvement to mid 90s O2 saturation. EKG showed afib with RVR to the 130-140s. STAT labs and blood cultures were drawn and CXR was ordered STAT. Patient was given IV tylenol, cold packs, and sepsis bundle was initiated -- 500 cc of fluid was given. At that time, a rapid response was called. Attending, pulm fellow, and ICU consult team were all at bedside. Blood pressures were inconsistent, reading systolic 60s at one point on one arm, but as high as 190s systolic on the other. A manual BP confirmed systolic BP ranging from 120-140.     Family was at bedside and confirmed patient was full code. ICU team accepted the patient to MICU for further management of severe sepsis 2/2 recurrent UTI vs. aspiration pneumonia, complicated by afib with RVR.    Vinay Brock, PGY-3 PGY-3 EVENT/TRANSFER NOTE    89 y/o M with atrial fibrillation (on Toprol XL and Coumadin), HTN, Parkinson's (on Sinemet), BPH, and ETOH abuse, initially BIBEMS to [facility] on 8/21 after being found down at home. Patient was found face down on the floor by his cleaning lady with bruising on his cheeks. Patient last seen well five days prior. Patient was altered on presentation an unable to provide history. At [facility] ED T 96.5,  in Afib, /91, RR18, O2 100%. Labs showed BUN/Cr significantly elevated from baseline. Minor trop elevation. UA positive for LE, WBC, nitrites, and bacteria. Patient was given Ceftriaxone, 0.5mg Ativan due to tremor, and 2LNS. He was started on bicarb drip and admitted to [facility] for toxic metabolic encephalopathy and sepsis 2/2 UTI.     Course was complicated by afib with RVR to 160s due to missed toprol doses. However this resolved with lopressor IV. On 8/23 patient was assessed by speech and swallow and recommended NPO. NGT was placed for medication administration and Sinemet was restarted. Toprol XL unable to be crushed, so patient was switched to lopressor 100mg BID with HR normalizing. Pt also found to be hypernatremic to 153. Pt was switched to D5 with resolution of hypernatremia. During course on [facility], mental status continued to improve to baseline. Patient finished a seven-day course of ceftriaxone for UTI, although organisms were never isolated on blood cultures or urine cultures. SW started planning for ALBIN.    On 8/28, was called to bedside for rigors. Patient was awake and answering questions, without any focal complaints. Exam notable for ill appearing, elderly frail man, tachycardic, CTA b/l, abdomen soft, NT/ND, extremities warm and well perfused. VS T 100.8 rectal, HR 130s, /70, O2 sat 80s on ambient air. Patient was placed on nasal cannula, then to NRB with improvement to mid 90s O2 saturation. EKG showed afib with RVR to the 130-140s. STAT labs and blood cultures were drawn and CXR was ordered STAT. Patient was given IV tylenol, cold packs, and sepsis bundle was initiated -- 500 cc of fluid was given. At that time, a rapid response was called. Attending, pulm fellow, and ICU consult team were all at bedside. Blood pressures were inconsistent, reading systolic 60s at one point on one arm, but as high as 190s systolic on the other. A manual BP confirmed systolic BP ranging from 120-140.     Family was at bedside and confirmed patient was full code. ICU team accepted the patient to MICU for further management of severe sepsis 2/2 recurrent UTI vs. aspiration pneumonia, complicated by afib with RVR.    Vinay Brock, PGY-3 PGY-3 EVENT/TRANSFER NOTE    89 y/o M with atrial fibrillation (on Toprol XL and Coumadin), HTN, Parkinson's (on Sinemet), BPH, and ETOH abuse, initially BIBEMS to [facility] on 8/21 after being found down at home. Last seen well five days prior. Patient was altered on presentation an unable to provide history. At [facility] ED T 96.5,  in Afib, /91, RR18, O2 100%. Labs showed BUN/Cr significantly elevated from baseline. UA positive for LE, WBC, nitrites, and bacteria. Started on ceftriaxone and given 1L NS x 2. Started on bicarb drip and admitted to [facility] for toxic metabolic encephalopathy and sepsis 2/2 UTI.     Course was complicated by afib with RVR to 160s due to missed toprol doses, resolved with lopressor IV. On 8/23 patient was assessed by speech and swallow and recommended NPO. NGT was placed for medication administration and Sinemet was restarted. Toprol XL unable to be crushed, so patient was switched to lopressor 100mg BID with HR normalizing. Pt also found to be hypernatremic to 153. Pt was switched to D5 with resolution of hypernatremia. During course on [facility], mental status continued to improve to baseline. Patient finished a seven-day course of ceftriaxone for UTI, although organisms were never isolated on blood cultures or urine cultures. SW started planning for ALBIN.    On 8/28, was called to bedside for rigors. Patient was awake and answering questions, without any focal complaints. Exam notable for ill appearing, elderly frail man, tachycardic, CTA b/l, abdomen soft, NT/ND, extremities warm and well perfused. VS T 100.8 rectal, HR 130s, /70, O2 sat 80s on ambient air. Patient was placed on nasal cannula, then to NRB with improvement to mid 90s O2 saturation. EKG showed afib with RVR to the 130-140s. STAT labs and blood cultures were drawn and CXR was ordered STAT. Patient was given IV tylenol, cold packs, and sepsis bundle was initiated -- 500 cc of fluid was given. At that time, a rapid response was called. Attending, pulm fellow, and ICU consult team were all at bedside. Blood pressures were inconsistent, reading systolic 60s at one point on one arm, but as high as 190s systolic on the other. A manual BP confirmed systolic BP ranging from 120-140.     Family was at bedside and confirmed patient was full code. ICU team accepted the patient to MICU for further management of severe sepsis 2/2 recurrent UTI vs. aspiration pneumonia, complicated by afib with RVR.    Vinay Brock, PGY-3

## 2018-08-28 NOTE — PROGRESS NOTE ADULT - PROBLEM SELECTOR PLAN 1
continued improvement with fluids althoug a bit worse today- likely was not gett ing adequate fluid with poor PO intake.  Continue as per primary team

## 2018-08-28 NOTE — PROGRESS NOTE ADULT - SUBJECTIVE AND OBJECTIVE BOX
OVERNIGHT EVENTS: Seen by PT who recommends ALBIN. Followed by palliative. Night team reordered Good after patient failed TOV.    SUBJECTIVE / INTERVAL HPI: Patient seen and examined at bedside. He rambles when asked about most ROS questions but repeatedly denies pain in any location including HA, CP, abd pain, pain of extremities or dysuria. He enr He denies difficulty breathing.     VITAL SIGNS:  Vital Signs Last 24 Hrs  T(C): 36.4 (28 Aug 2018 08:35), Max: 36.9 (28 Aug 2018 05:47)  T(F): 97.5 (28 Aug 2018 08:35), Max: 98.5 (28 Aug 2018 05:47)  HR: 85 (28 Aug 2018 08:35) (77 - 100)  BP: 125/73 (28 Aug 2018 08:35) (118/65 - 134/87)  BP(mean): --  RR: 18 (28 Aug 2018 08:35) (18 - 20)  SpO2: 93% (28 Aug 2018 08:35) (93% - 97%)    PHYSICAL EXAM:    Constitutional: WD/WN adult white male, laying in bed in NAD.  HEENT: EOMI, PERRLA, MMM  Respiratory: Difficult to assess 2/2 poor inspiratory effort, but no wheezing, no rhonchi, no rales, without accessory muscle use and no intercostal retractions  Cardiovascular: Tachycardic, normal S1S2  Gastrointestinal: soft, NTND, no masses palpable, BS present  : Good in situ  Extremities: Warm, well perfused, pulses equal bilateral upper and lower extremities, no edema; L upper arm with deformity 2/2 trauma  Skin: scabs on cheeks and chin entirely healed; bruise on RUE; well-healing scab on L knee  Neuropsych: Alert, awake, oriented to self, mumbles when asked location; complies with simple commands; moving 4x extremities to command; CN grossly intact; sensorium grossly intact      MEDICATIONS:  MEDICATIONS  (STANDING):  BACItracin   Ointment 1 Application(s) Topical daily  carbidopa/levodopa  25/100 1 Tablet(s) Oral three times a day  folic acid Injectable 1 milliGRAM(s) IV Push daily  metoprolol tartrate Injectable 5 milliGRAM(s) IV Push every 6 hours  piperacillin/tazobactam IVPB. 3.375 Gram(s) IV Intermittent every 6 hours  thiamine Injectable 100 milliGRAM(s) IV Push daily    MEDICATIONS  (PRN):  acetaminophen  Suppository 650 milliGRAM(s) Rectal every 6 hours PRN For Temp greater than 38 C (100.4 F)      ALLERGIES:  Allergies    aspirin (Unknown)    Intolerances        LABS:                        13.0   11.3  )-----------( 207      ( 28 Aug 2018 12:43 )             40.1     08-28    142  |  104  |  25<H>  ----------------------------<  120<H>  4.0   |  21<L>  |  1.50<H>    Ca    9.0      28 Aug 2018 12:43  Phos  2.3     08-27  Mg     1.8     08-27    TPro  7.0  /  Alb  3.4  /  TBili  0.9  /  DBili  x   /  AST  32  /  ALT  <5<L>  /  AlkPhos  64  08-28    PT/INR - ( 28 Aug 2018 12:43 )   PT: 25.7 sec;   INR: 2.28          PTT - ( 28 Aug 2018 12:43 )  PTT:38.8 sec    CAPILLARY BLOOD GLUCOSE      POCT Blood Glucose.: 99 mg/dL (28 Aug 2018 12:17)      RADIOLOGY & ADDITIONAL TESTS: Reviewed.

## 2018-08-28 NOTE — PROGRESS NOTE ADULT - ASSESSMENT
89 yo with Parkinsons found face down at home following a fall 24-48 hours previously.  Dehydrated hypotensive, renal failure.  Perhaps some role of alcohol although screen was negative.  Renal failure had improved but patient reemained confused.  Now with likely aspiration and sepsis transferred to ICU

## 2018-08-28 NOTE — PROGRESS NOTE ADULT - PROBLEM SELECTOR PLAN 7
-CIWA consistently negative; last drink >72 hours prior to admission    #Goals of care discussion  - palliative on board, appreciate recs

## 2018-08-29 DIAGNOSIS — N17.9 ACUTE KIDNEY FAILURE, UNSPECIFIED: ICD-10-CM

## 2018-08-29 DIAGNOSIS — J15.6 PNEUMONIA DUE TO OTHER GRAM-NEGATIVE BACTERIA: ICD-10-CM

## 2018-08-29 DIAGNOSIS — A41.9 SEPSIS, UNSPECIFIED ORGANISM: ICD-10-CM

## 2018-08-29 LAB
ANION GAP SERPL CALC-SCNC: 17 MMOL/L — SIGNIFICANT CHANGE UP (ref 5–17)
APTT BLD: 20.8 SEC — LOW (ref 27.5–37.4)
BASOPHILS NFR BLD AUTO: 0.1 % — SIGNIFICANT CHANGE UP (ref 0–2)
BUN SERPL-MCNC: 27 MG/DL — HIGH (ref 7–23)
CALCIUM SERPL-MCNC: 8.4 MG/DL — SIGNIFICANT CHANGE UP (ref 8.4–10.5)
CHLORIDE SERPL-SCNC: 106 MMOL/L — SIGNIFICANT CHANGE UP (ref 96–108)
CO2 SERPL-SCNC: 17 MMOL/L — LOW (ref 22–31)
CREAT SERPL-MCNC: 1.47 MG/DL — HIGH (ref 0.5–1.3)
EOSINOPHIL NFR BLD AUTO: 3.2 % — SIGNIFICANT CHANGE UP (ref 0–6)
GLUCOSE SERPL-MCNC: 97 MG/DL — SIGNIFICANT CHANGE UP (ref 70–99)
GRAM STN FLD: SIGNIFICANT CHANGE UP
HCT VFR BLD CALC: 34.6 % — LOW (ref 39–50)
HGB BLD-MCNC: 11.1 G/DL — LOW (ref 13–17)
INR BLD: 2.97 — HIGH (ref 0.88–1.16)
LYMPHOCYTES # BLD AUTO: 5.3 % — LOW (ref 13–44)
MAGNESIUM SERPL-MCNC: 2.2 MG/DL — SIGNIFICANT CHANGE UP (ref 1.6–2.6)
MCHC RBC-ENTMCNC: 31.3 PG — SIGNIFICANT CHANGE UP (ref 27–34)
MCHC RBC-ENTMCNC: 32.1 G/DL — SIGNIFICANT CHANGE UP (ref 32–36)
MCV RBC AUTO: 97.5 FL — SIGNIFICANT CHANGE UP (ref 80–100)
MONOCYTES NFR BLD AUTO: 4.7 % — SIGNIFICANT CHANGE UP (ref 2–14)
NEUTROPHILS NFR BLD AUTO: 86.7 % — HIGH (ref 43–77)
PHOSPHATE SERPL-MCNC: 2.6 MG/DL — SIGNIFICANT CHANGE UP (ref 2.5–4.5)
PLATELET # BLD AUTO: 152 K/UL — SIGNIFICANT CHANGE UP (ref 150–400)
POTASSIUM SERPL-MCNC: 4.4 MMOL/L — SIGNIFICANT CHANGE UP (ref 3.5–5.3)
POTASSIUM SERPL-SCNC: 4.4 MMOL/L — SIGNIFICANT CHANGE UP (ref 3.5–5.3)
PROTHROM AB SERPL-ACNC: 33.7 SEC — HIGH (ref 9.8–12.7)
RBC # BLD: 3.55 M/UL — LOW (ref 4.2–5.8)
RBC # FLD: 13.8 % — SIGNIFICANT CHANGE UP (ref 10.3–16.9)
SODIUM SERPL-SCNC: 140 MMOL/L — SIGNIFICANT CHANGE UP (ref 135–145)
SPECIMEN SOURCE: SIGNIFICANT CHANGE UP
WBC # BLD: 14.7 K/UL — HIGH (ref 3.8–10.5)
WBC # FLD AUTO: 14.7 K/UL — HIGH (ref 3.8–10.5)

## 2018-08-29 PROCEDURE — 93010 ELECTROCARDIOGRAM REPORT: CPT

## 2018-08-29 PROCEDURE — 99233 SBSQ HOSP IP/OBS HIGH 50: CPT

## 2018-08-29 PROCEDURE — 99233 SBSQ HOSP IP/OBS HIGH 50: CPT | Mod: GC

## 2018-08-29 PROCEDURE — 71045 X-RAY EXAM CHEST 1 VIEW: CPT | Mod: 26

## 2018-08-29 RX ORDER — PIPERACILLIN AND TAZOBACTAM 4; .5 G/20ML; G/20ML
3.38 INJECTION, POWDER, LYOPHILIZED, FOR SOLUTION INTRAVENOUS EVERY 6 HOURS
Qty: 0 | Refills: 0 | Status: DISCONTINUED | OUTPATIENT
Start: 2018-08-30 | End: 2018-09-04

## 2018-08-29 RX ORDER — SODIUM CHLORIDE 9 MG/ML
1000 INJECTION, SOLUTION INTRAVENOUS
Qty: 0 | Refills: 0 | Status: DISCONTINUED | OUTPATIENT
Start: 2018-08-29 | End: 2018-09-01

## 2018-08-29 RX ORDER — SODIUM CHLORIDE 9 MG/ML
1000 INJECTION INTRAMUSCULAR; INTRAVENOUS; SUBCUTANEOUS
Qty: 0 | Refills: 0 | Status: DISCONTINUED | OUTPATIENT
Start: 2018-08-29 | End: 2018-08-29

## 2018-08-29 RX ORDER — METOPROLOL TARTRATE 50 MG
12.5 TABLET ORAL EVERY 12 HOURS
Qty: 0 | Refills: 0 | Status: DISCONTINUED | OUTPATIENT
Start: 2018-08-29 | End: 2018-08-30

## 2018-08-29 RX ORDER — SODIUM CHLORIDE 9 MG/ML
1000 INJECTION INTRAMUSCULAR; INTRAVENOUS; SUBCUTANEOUS ONCE
Qty: 0 | Refills: 0 | Status: COMPLETED | OUTPATIENT
Start: 2018-08-29 | End: 2018-08-29

## 2018-08-29 RX ORDER — CHLORHEXIDINE GLUCONATE 213 G/1000ML
1 SOLUTION TOPICAL DAILY
Qty: 0 | Refills: 0 | Status: DISCONTINUED | OUTPATIENT
Start: 2018-08-29 | End: 2018-08-29

## 2018-08-29 RX ORDER — ERYTHROMYCIN BASE 5 MG/GRAM
1 OINTMENT (GRAM) OPHTHALMIC (EYE) DAILY
Qty: 0 | Refills: 0 | Status: DISCONTINUED | OUTPATIENT
Start: 2018-08-29 | End: 2018-09-07

## 2018-08-29 RX ORDER — SODIUM CHLORIDE 9 MG/ML
1000 INJECTION, SOLUTION INTRAVENOUS
Qty: 0 | Refills: 0 | Status: DISCONTINUED | OUTPATIENT
Start: 2018-08-29 | End: 2018-08-29

## 2018-08-29 RX ADMIN — SODIUM CHLORIDE 100 MILLILITER(S): 9 INJECTION, SOLUTION INTRAVENOUS at 06:20

## 2018-08-29 RX ADMIN — PIPERACILLIN AND TAZOBACTAM 200 GRAM(S): 4; .5 INJECTION, POWDER, LYOPHILIZED, FOR SOLUTION INTRAVENOUS at 11:44

## 2018-08-29 RX ADMIN — SODIUM CHLORIDE 80 MILLILITER(S): 9 INJECTION, SOLUTION INTRAVENOUS at 20:16

## 2018-08-29 RX ADMIN — Medication 105 MILLIGRAM(S): at 00:12

## 2018-08-29 RX ADMIN — PIPERACILLIN AND TAZOBACTAM 200 GRAM(S): 4; .5 INJECTION, POWDER, LYOPHILIZED, FOR SOLUTION INTRAVENOUS at 18:03

## 2018-08-29 RX ADMIN — Medication 1 MILLIGRAM(S): at 11:45

## 2018-08-29 RX ADMIN — CARBIDOPA AND LEVODOPA 1 TABLET(S): 25; 100 TABLET ORAL at 14:02

## 2018-08-29 RX ADMIN — Medication 5 MILLILITER(S): at 11:26

## 2018-08-29 RX ADMIN — Medication 12.5 MILLIGRAM(S): at 11:50

## 2018-08-29 RX ADMIN — CHLORHEXIDINE GLUCONATE 1 APPLICATION(S): 213 SOLUTION TOPICAL at 11:24

## 2018-08-29 RX ADMIN — Medication 3 MILLILITER(S): at 00:13

## 2018-08-29 RX ADMIN — PIPERACILLIN AND TAZOBACTAM 200 GRAM(S): 4; .5 INJECTION, POWDER, LYOPHILIZED, FOR SOLUTION INTRAVENOUS at 03:56

## 2018-08-29 RX ADMIN — Medication 105 MILLIGRAM(S): at 14:05

## 2018-08-29 RX ADMIN — SODIUM CHLORIDE 1000 MILLILITER(S): 9 INJECTION INTRAMUSCULAR; INTRAVENOUS; SUBCUTANEOUS at 07:30

## 2018-08-29 RX ADMIN — PIPERACILLIN AND TAZOBACTAM 200 GRAM(S): 4; .5 INJECTION, POWDER, LYOPHILIZED, FOR SOLUTION INTRAVENOUS at 08:49

## 2018-08-29 RX ADMIN — Medication 1 APPLICATION(S): at 18:03

## 2018-08-29 RX ADMIN — Medication 105 MILLIGRAM(S): at 06:20

## 2018-08-29 RX ADMIN — Medication 100 MILLIEQUIVALENT(S): at 01:20

## 2018-08-29 RX ADMIN — Medication 1 APPLICATION(S): at 12:31

## 2018-08-29 RX ADMIN — Medication 105 MILLIGRAM(S): at 21:00

## 2018-08-29 RX ADMIN — Medication 12.5 MILLIGRAM(S): at 18:03

## 2018-08-29 RX ADMIN — SODIUM CHLORIDE 80 MILLILITER(S): 9 INJECTION, SOLUTION INTRAVENOUS at 14:00

## 2018-08-29 RX ADMIN — CARBIDOPA AND LEVODOPA 1 TABLET(S): 25; 100 TABLET ORAL at 21:00

## 2018-08-29 NOTE — PROGRESS NOTE ADULT - ASSESSMENT
89 y/o M with hx of Parkinson disease, HTN, BPH, ETOH abuse, afib on Coumadin, found down at home for unknown period of time (up to 2 days) admitted for toxic metabolic encephalopathy and acute renal failure and found to have UTI.    Infectious Disease  # Sepsis  - UTI POA possible source, being treated with Ceftriaxone 1g q24h last dose on 27th.  - previous urine and blood cultures have negative since pt was treated with rocephin  - pt had episode of hypotension overnight SBP in the 80's, s/p fluid bolus now normotensive  - f/u repeat urine and blood culture  - IVF lactated ringers at 80cc/hr  - zosyn for abx coverage  - pt safe to transfer to step down unit 7L    Pulmonology  # Acute respiratory distress  - CXR + for atelectasis   - ABG +met acidosis with resp alkalosis  - saturation stable on high flow NC  - will cont with oxygen supplementation and pulmonary toilet.     Neurology  # Toxic metabolic encephalopathy  - pt originally presented with UTI in AMS  - currently A&Ox1 to person only  - AMS likely 2/2 sepsis, will cont to monitor and treat underlying disease    # Parkinson's disease.    - c/w home Sinemet    Nutrition  #Speech   - per speech FEES showed aspiration with thin liquid, residuals with nectar thick liquids   - d/t to pt's worsening of respiratory status will wait 24hrs before restarting oral diet  - will start mechanical soft nectar thick oral diet in 24hrs  # Nutrition  - for NG tube feeds start jevity 1.2 at 80  	  Renal  # Acute renal failure   - in setting of dehydration, obstruction from BPH, underlying CKD 3  - Cr today 1.47  - cont IVF and trend BMP's      #BPH  - continue home tamsulosin 0.4 mg daily  - continue home finasteride      Cardiology  # Chronic atrial fibrillation.   - h/o of a-fib on home Coumadin and Toprol XL 200mg  - on lopressor for rate control. Pt has been mildy tachy in 120's to 90's  - INR was elevated d/t coumadin, will transition to hep gtt once INR <2.0  - f/u coags and start heparin when INR <2.0    # Decondition due to acute illness  - PT recommends ALBIN  - appreciate PT recs  - appreciate SW, CC assistance with placement  - appreciate palliative assistance.       # Essential hypertension.   - hold home amlodipine 5mg  - continue to hold home benazepril until NILS and hypotension fully resolved.    Psychiatry   # Alcohol abuse  - CIWA consistently negative; last drink >72 hours prior to admission      Heme/Onc  # Thrombocytopenia  - likely in setting of etoh abuse  - trend CBC    # Anemia   - stable  - follow CBC.

## 2018-08-29 NOTE — PROGRESS NOTE ADULT - PROBLEM SELECTOR PLAN 7
Pt has hx of alcohol abuse. Last drink prior to admission. Alcohol level 0 on admission. CIWA consistently 0  - c/w B12, Folate, thiamine    # Thrombocytopenia  - likely in setting of etoh abuse  - continue to trend CBC    # Anemia   - stable  - continue to trend CBC Pt has hx of alcohol abuse. Last drink prior to admission. Alcohol level 0 on admission. CIWA consistently 0  - c/w Folate, thiamine    # Thrombocytopenia  - likely in setting of etoh abuse  - continue to trend CBC    # Anemia   - stable  - continue to trend CBC

## 2018-08-29 NOTE — SWALLOW BEDSIDE ASSESSMENT ADULT - MUCOSAL QUALITY
clean, moist
Brownish-red colored thick secretions noted on palate. Dry brown crust noted on tongue. Per MD, this is likely dried blood 2/2 fall. Oral care w suctioning provided.

## 2018-08-29 NOTE — PROVIDER CONTACT NOTE (OTHER) - ASSESSMENT
Upon reassessment, patient noted with IV site to left arm infiltrated. Redness and swelling noted to site. Patient complaining of pain to site. REGINA Jamison and Ivelisse De Leon made aware after multiple attempts to establish new IV site along with multiple staff nurses.

## 2018-08-29 NOTE — PROGRESS NOTE ADULT - SUBJECTIVE AND OBJECTIVE BOX
HOSPITAL COURSE  89 y/o M with atrial fibrillation (on Toprol XL and Coumadin), HTN, Parkinson's (on Sinemet), BPH, and ETOH abuse, initially BIBEMS to [facility] on  after being found down at home. Last seen well five days prior. Patient was altered on presentation an unable to provide history. At [facility] ED T 96.5,  in Afib, /91, RR18, O2 100%. Labs showed BUN/Cr significantly elevated from baseline. UA positive for LE, WBC, nitrites, and bacteria. Started on ceftriaxone and given 1L NS x 2. Started on bicarb drip and admitted to [facility] for toxic metabolic encephalopathy and sepsis 2/2 UTI.     Course was complicated by afib with RVR to 160s due to missed toprol doses, resolved with lopressor IV. On  patient was assessed by speech and swallow and recommended NPO. NGT was placed for medication administration and Sinemet was restarted. Toprol XL unable to be crushed, so patient was switched to lopressor 100mg BID with HR normalizing. Pt also found to be hypernatremic to 153. Pt was switched to D5 with resolution of hypernatremia. During course on [facility], mental status continued to improve to baseline. Patient finished a seven-day course of ceftriaxone for UTI, although organisms were never isolated on blood cultures or urine cultures. SW started planning for ALBIN.    On , was called to bedside for rigors. Patient was awake and answering questions, without any focal complaints. Exam notable for ill appearing, elderly frail man, tachycardic, CTA b/l, abdomen soft, NT/ND, extremities warm and well perfused. VS T 100.8 rectal, HR 130s, /70, O2 sat 80s on ambient air. Patient was placed on nasal cannula, then to NRB with improvement to mid 90s O2 saturation. EKG showed afib with RVR to the 130-140s. STAT labs and blood cultures were drawn and CXR was ordered STAT. Patient was given IV tylenol, cold packs, and sepsis bundle was initiated -- 500 cc of fluid was given. At that time, a rapid response was called. Attending, pulm fellow, and ICU consult team were all at bedside. Blood pressures were inconsistent, reading systolic 60s at one point on one arm, but as high as 190s systolic on the other. A manual BP confirmed systolic BP ranging from 120-140.     -. Pt admitted for ICU for observation. Abx coverage changed to zosyn. Pt started on fluids for BP support. BCx, UCx, and sputum Cx spent to lab. Sputum +staph. BCx and UCx pending. NG tube placed. See plan for nutrition and speech recommendations. Pt ready for transfer to 7L step down unit.     INTERVAL HPI/OVERNIGHT EVENTS:  Patient was seen and examined at bedside. As per nurse and patient, no o/n events, patient resting comfortably. No complaints at this time. Patient denies: fever, chills, dizziness, weakness, HA, Changes in vision, CP, palpitations, SOB, cough, N/V/D/C, dysuria, changes in bowel movements, LE edema. ROS otherwise negative.    VITAL SIGNS:  T(F): 99.1 (18 @ 14:24)  HR: 98 (18 @ 15:00)  BP: 119/65 (18 @ 15:00)  RR: 18 (18 @ 15:00)  SpO2: 100% (18 @ 15:00)  Wt(kg): --    PHYSICAL EXAM:    Constitutional: WDWN, NAD  HEENT: PERRL, EOMI, sclera non-icteric, neck supple, trachea midline, no masses, no JVD, MMM, good dentition  Respiratory: CTA b/l, good air entry b/l, no wheezing, no rhonchi, no rales, without accessory muscle use and no intercostal retractions  Cardiovascular: RRR, normal S1S2, no M/R/G  Gastrointestinal: soft, NTND, no masses palpable, BS normal  Extremities: Warm, well perfused, pulses equal bilateral upper and lower extremities, no edema, no clubbing. Capillary refill <2 sec  Neurological: AAOx3, CN Grossly intact  Skin: Normal temperature, warm, dry    MEDICATIONS  (STANDING):  BACItracin   Ointment 1 Application(s) Topical daily  Biotene Dry Mouth Oral Rinse 5 milliLiter(s) Swish and Spit daily  carbidopa/levodopa  25/100 1 Tablet(s) Oral three times a day  chlorhexidine 2% Cloths 1 Application(s) Topical daily  erythromycin   Ointment 1 Application(s) Right EYE daily  folic acid Injectable 1 milliGRAM(s) IV Push daily  lactated ringers. 1000 milliLiter(s) (80 mL/Hr) IV Continuous <Continuous>  metoprolol tartrate 12.5 milliGRAM(s) Oral every 12 hours  piperacillin/tazobactam IVPB. 3.375 Gram(s) IV Intermittent every 6 hours  thiamine IVPB 500 milliGRAM(s) IV Intermittent three times a day    MEDICATIONS  (PRN):  acetaminophen  Suppository 650 milliGRAM(s) Rectal every 6 hours PRN For Temp greater than 38 C (100.4 F)      Allergies    aspirin (Unknown)    Intolerances        LABS:                        11.1   14.7  )-----------( 152      ( 29 Aug 2018 05:14 )             34.6     08-    140  |  106  |  27<H>  ----------------------------<  97  4.4   |  17<L>  |  1.47<H>    Ca    8.4      29 Aug 2018 05:13  Phos  2.6       Mg     2.2         TPro  5.8<L>  /  Alb  2.7<L>  /  TBili  0.8  /  DBili  x   /  AST  24  /  ALT  <5<L>  /  AlkPhos  54  08-28    PT/INR - ( 29 Aug 2018 05:15 )   PT: 33.7 sec;   INR: 2.97          PTT - ( 29 Aug 2018 05:15 )  PTT:20.8 sec  Urinalysis Basic - ( 28 Aug 2018 13:54 )    Color: Yellow / Appearance: Cloudy / S.025 / pH: x  Gluc: x / Ketone: 15 mg/dL  / Bili: Negative / Urobili: 0.2 E.U./dL   Blood: x / Protein: 100 mg/dL / Nitrite: NEGATIVE   Leuk Esterase: Small / RBC: Many /HPF / WBC 5-10 /HPF   Sq Epi: x / Non Sq Epi: Moderate /HPF / Bacteria: Present /HPF        RADIOLOGY & ADDITIONAL TESTS:  Reviewed HOSPITAL COURSE  89 y/o M with atrial fibrillation (on Toprol XL and Coumadin), HTN, Parkinson's (on Sinemet), BPH, and ETOH abuse, initially BIBEMS to [facility] on  after being found down at home. Last seen well five days prior. Patient was altered on presentation an unable to provide history. At [facility] ED T 96.5,  in Afib, /91, RR18, O2 100%. Labs showed BUN/Cr significantly elevated from baseline. UA positive for LE, WBC, nitrites, and bacteria. Started on ceftriaxone and given 1L NS x 2. Started on bicarb drip and admitted to [facility] for toxic metabolic encephalopathy and sepsis 2/2 UTI.     Course was complicated by afib with RVR to 160s due to missed toprol doses, resolved with lopressor IV. On  patient was assessed by speech and swallow and recommended NPO. NGT was placed for medication administration and Sinemet was restarted. Toprol XL unable to be crushed, so patient was switched to lopressor 100mg BID with HR normalizing. Pt also found to be hypernatremic to 153. Pt was switched to D5 with resolution of hypernatremia. During course on [facility], mental status continued to improve to baseline. Patient finished a seven-day course of ceftriaxone for UTI, although organisms were never isolated on blood cultures or urine cultures. SW started planning for ALBIN.    On , was called to bedside for rigors. Patient was awake and answering questions, without any focal complaints. Exam notable for ill appearing, elderly frail man, tachycardic, CTA b/l, abdomen soft, NT/ND, extremities warm and well perfused. VS T 100.8 rectal, HR 130s, /70, O2 sat 80s on ambient air. Patient was placed on nasal cannula, then to NRB with improvement to mid 90s O2 saturation. EKG showed afib with RVR to the 130-140s. STAT labs and blood cultures were drawn and CXR was ordered STAT. Patient was given IV tylenol, cold packs, and sepsis bundle was initiated -- 500 cc of fluid was given. At that time, a rapid response was called. Attending, pulm fellow, and ICU consult team were all at bedside. Blood pressures were inconsistent, reading systolic 60s at one point on one arm, but as high as 190s systolic on the other. A manual BP confirmed systolic BP ranging from 120-140.     /-. Pt admitted for ICU for observation. Abx coverage changed to zosyn. Pt started on fluids for BP support. BCx, UCx, and sputum Cx spent to lab. Sputum +staph. BCx and UCx pending. NG tube placed.       VITAL SIGNS:  T(F): 99.1 (18 @ 14:24)  HR: 98 (18 @ 15:00)  BP: 119/65 (18 @ 15:00)  RR: 18 (18 @ 15:00)  SpO2: 100% (18 @ 15:00)  Wt(kg): --    PHYSICAL EXAM:    Constitutional: WDWN, NAD  HEENT: PERRL, EOMI, sclera non-icteric, neck supple, trachea midline, no masses, no JVD, MMM, good dentition  Respiratory: CTA b/l, good air entry b/l, no wheezing, no rhonchi, no rales, without accessory muscle use and no intercostal retractions  Cardiovascular: RRR, normal S1S2, no M/R/G  Gastrointestinal: soft, NTND, no masses palpable, BS normal  Extremities: Warm, well perfused, pulses equal bilateral upper and lower extremities, no edema, no clubbing. Capillary refill <2 sec  Neurological: AAOx3, CN Grossly intact  Skin: Normal temperature, warm, dry    MEDICATIONS  (STANDING):  BACItracin   Ointment 1 Application(s) Topical daily  Biotene Dry Mouth Oral Rinse 5 milliLiter(s) Swish and Spit daily  carbidopa/levodopa  25/100 1 Tablet(s) Oral three times a day  chlorhexidine 2% Cloths 1 Application(s) Topical daily  erythromycin   Ointment 1 Application(s) Right EYE daily  folic acid Injectable 1 milliGRAM(s) IV Push daily  lactated ringers. 1000 milliLiter(s) (80 mL/Hr) IV Continuous <Continuous>  metoprolol tartrate 12.5 milliGRAM(s) Oral every 12 hours  piperacillin/tazobactam IVPB. 3.375 Gram(s) IV Intermittent every 6 hours  thiamine IVPB 500 milliGRAM(s) IV Intermittent three times a day    MEDICATIONS  (PRN):  acetaminophen  Suppository 650 milliGRAM(s) Rectal every 6 hours PRN For Temp greater than 38 C (100.4 F)      Allergies    aspirin (Unknown)    Intolerances        LABS:                        11.1   14.7  )-----------( 152      ( 29 Aug 2018 05:14 )             34.6     -    140  |  106  |  27<H>  ----------------------------<  97  4.4   |  17<L>  |  1.47<H>    Ca    8.4      29 Aug 2018 05:13  Phos  2.6       Mg     2.2         TPro  5.8<L>  /  Alb  2.7<L>  /  TBili  0.8  /  DBili  x   /  AST  24  /  ALT  <5<L>  /  AlkPhos  54      PT/INR - ( 29 Aug 2018 05:15 )   PT: 33.7 sec;   INR: 2.97          PTT - ( 29 Aug 2018 05:15 )  PTT:20.8 sec  Urinalysis Basic - ( 28 Aug 2018 13:54 )    Color: Yellow / Appearance: Cloudy / S.025 / pH: x  Gluc: x / Ketone: 15 mg/dL  / Bili: Negative / Urobili: 0.2 E.U./dL   Blood: x / Protein: 100 mg/dL / Nitrite: NEGATIVE   Leuk Esterase: Small / RBC: Many /HPF / WBC 5-10 /HPF   Sq Epi: x / Non Sq Epi: Moderate /HPF / Bacteria: Present /HPF        RADIOLOGY & ADDITIONAL TESTS:  Reviewed HOSPITAL COURSE  89 y/o M with atrial fibrillation (on Toprol XL and Coumadin), HTN, Parkinson's (on Sinemet), BPH, and ETOH abuse, initially BIBEMS to [facility] on  after being found down at home. Last seen well five days prior. Patient was altered on presentation an unable to provide history. At [facility] ED T 96.5,  in Afib, /91, RR18, O2 100%. Labs showed BUN/Cr significantly elevated from baseline. UA positive for LE, WBC, nitrites, and bacteria. Started on ceftriaxone and given 1L NS x 2. Started on bicarb drip and admitted to [facility] for toxic metabolic encephalopathy and sepsis 2/2 UTI.     Course was complicated by afib with RVR to 160s due to missed toprol doses, resolved with lopressor IV. On  patient was assessed by speech and swallow and recommended NPO. NGT was placed for medication administration and Sinemet was restarted. Toprol XL unable to be crushed, so patient was switched to lopressor 100mg BID with HR normalizing. Pt also found to be hypernatremic to 153. Pt was switched to D5 with resolution of hypernatremia. During course on [facility], mental status continued to improve to baseline. Patient finished a seven-day course of ceftriaxone for UTI, although organisms were never isolated on blood cultures or urine cultures. SW started planning for ALBIN.    On , was called to bedside for rigors. Patient was awake and answering questions, without any focal complaints. Exam notable for ill appearing, elderly frail man, tachycardic, CTA b/l, abdomen soft, NT/ND, extremities warm and well perfused. VS T 100.8 rectal, HR 130s, /70, O2 sat 80s on ambient air. Patient was placed on nasal cannula, then to NRB with improvement to mid 90s O2 saturation. EKG showed afib with RVR to the 130-140s. STAT labs and blood cultures were drawn and CXR was ordered STAT. Patient was given IV tylenol, cold packs, and sepsis bundle was initiated -- 500 cc of fluid was given. At that time, a rapid response was called. Attending, pulm fellow, and ICU consult team were all at bedside. Blood pressures were inconsistent, reading systolic 60s at one point on one arm, but as high as 190s systolic on the other. A manual BP confirmed systolic BP ranging from 120-140.     -. Pt admitted for ICU for observation. Abx coverage changed to zosyn. Pt started on fluids for BP support. BCx, UCx, and sputum Cx spent to lab. Sputum +staph. BCx and UCx pending. NG tube placed.       VITAL SIGNS:  T(F): 99.1 (18 @ 14:24)  HR: 98 (18 @ 15:00)  BP: 119/65 (18 @ 15:00)  RR: 18 (18 @ 15:00)  SpO2: 100% (18 @ 15:00)  Wt(kg): --    PHYSICAL EXAM:    Constitutional: elderly male laying in bed in NAD w/ NGT in place  Respiratory: Difficult to assess 2/2 poor inspiratory effort, but no wheezing, no rhonchi, no rales, without accessory muscle use and no intercostal retractions  Cardiovascular: Tachycardic, normal S1S2, no M/R/G  Gastrointestinal: soft, NTND, no masses palpable, BS normal  : Good in situ  Extremities: Warm, well perfused, pulses equal bilateral upper and lower extremities, no edema; L upper arm with deformity 2/2 trauma  Skin: multiple healing contusions on face and chin; chin entirely healed, cheeks with scabs; well-healing scab on L knee    MEDICATIONS  (STANDING):  BACItracin   Ointment 1 Application(s) Topical daily  Biotene Dry Mouth Oral Rinse 5 milliLiter(s) Swish and Spit daily  carbidopa/levodopa  25/100 1 Tablet(s) Oral three times a day  chlorhexidine 2% Cloths 1 Application(s) Topical daily  erythromycin   Ointment 1 Application(s) Right EYE daily  folic acid Injectable 1 milliGRAM(s) IV Push daily  lactated ringers. 1000 milliLiter(s) (80 mL/Hr) IV Continuous <Continuous>  metoprolol tartrate 12.5 milliGRAM(s) Oral every 12 hours  piperacillin/tazobactam IVPB. 3.375 Gram(s) IV Intermittent every 6 hours  thiamine IVPB 500 milliGRAM(s) IV Intermittent three times a day    MEDICATIONS  (PRN):  acetaminophen  Suppository 650 milliGRAM(s) Rectal every 6 hours PRN For Temp greater than 38 C (100.4 F)      Allergies    aspirin (Unknown)    Intolerances        LABS:                        11.1   14.7  )-----------( 152      ( 29 Aug 2018 05:14 )             34.6         140  |  106  |  27<H>  ----------------------------<  97  4.4   |  17<L>  |  1.47<H>    Ca    8.4      29 Aug 2018 05:13  Phos  2.6       Mg     2.2         TPro  5.8<L>  /  Alb  2.7<L>  /  TBili  0.8  /  DBili  x   /  AST  24  /  ALT  <5<L>  /  AlkPhos  54      PT/INR - ( 29 Aug 2018 05:15 )   PT: 33.7 sec;   INR: 2.97          PTT - ( 29 Aug 2018 05:15 )  PTT:20.8 sec  Urinalysis Basic - ( 28 Aug 2018 13:54 )    Color: Yellow / Appearance: Cloudy / S.025 / pH: x  Gluc: x / Ketone: 15 mg/dL  / Bili: Negative / Urobili: 0.2 E.U./dL   Blood: x / Protein: 100 mg/dL / Nitrite: NEGATIVE   Leuk Esterase: Small / RBC: Many /HPF / WBC 5-10 /HPF   Sq Epi: x / Non Sq Epi: Moderate /HPF / Bacteria: Present /HPF        RADIOLOGY & ADDITIONAL TESTS:  Reviewed HOSPITAL COURSE  89 y/o M with atrial fibrillation (on Toprol XL and Coumadin), HTN, Parkinson's (on Sinemet), BPH, and ETOH abuse, initially BIBEMS to [facility] on  after being found down at home. Last seen well five days prior. Patient was altered on presentation an unable to provide history. At [facility] ED T 96.5,  in Afib, /91, RR18, O2 100%. Labs showed BUN/Cr significantly elevated from baseline. UA positive for LE, WBC, nitrites, and bacteria. Started on ceftriaxone and given 1L NS x 2. Started on bicarb drip and admitted to [facility] for toxic metabolic encephalopathy and sepsis 2/2 UTI.     Course was complicated by afib with RVR to 160s due to missed toprol doses, resolved with lopressor IV. On  patient was assessed by speech and swallow and recommended NPO. NGT was placed for medication administration and Sinemet was restarted. Toprol XL unable to be crushed, so patient was switched to lopressor 100mg BID with HR normalizing. Pt also found to be hypernatremic to 153. Pt was switched to D5 with resolution of hypernatremia. During course on [facility], mental status continued to improve to baseline. Patient finished a seven-day course of ceftriaxone for UTI, although organisms were never isolated on blood cultures or urine cultures. SW started planning for ALBIN.    On , was called to bedside for rigors. Patient was awake and answering questions, without any focal complaints. Exam notable for ill appearing, elderly frail man, tachycardic, CTA b/l, abdomen soft, NT/ND, extremities warm and well perfused. VS T 100.8 rectal, HR 130s, /70, O2 sat 80s on ambient air. Patient was placed on nasal cannula, then to NRB with improvement to mid 90s O2 saturation. EKG showed afib with RVR to the 130-140s. STAT labs and blood cultures were drawn and CXR was ordered STAT. Patient was given IV tylenol, cold packs, and sepsis bundle was initiated -- 500 cc of fluid was given. At that time, a rapid response was called. Attending, pulm fellow, and ICU consult team were all at bedside. Blood pressures were inconsistent, reading systolic 60s at one point on one arm, but as high as 190s systolic on the other. A manual BP confirmed systolic BP ranging from 120-140.     /-. Pt admitted for ICU for observation. Abx coverage changed to zosyn. Pt started on fluids for BP support. BCx, UCx, and sputum Cx spent to lab. Sputum +staph. BCx and UCx pending. NG tube placed.       VITAL SIGNS:  T(F): 99.1 (18 @ 14:24)  HR: 98 (18 @ 15:00)  BP: 119/65 (18 @ 15:00)  RR: 18 (18 @ 15:00)  SpO2: 100% (18 @ 15:00)  Wt(kg): --    PHYSICAL EXAM:    Constitutional: elderly male laying in bed in NAD w/ NGT in place on HFNC  Respiratory: Good inspiratory effort, + rhonchi, no wheezing or rales, without accessory muscle use and no intercostal retractions  Cardiovascular: Tachycardic, normal S1S2, no M/R/G  Gastrointestinal: soft, NTND, no masses palpable, BS normal  : Good in situ draining yellow urine, rectal tube in place draining dark loose stool with leaking around the tube  Extremities: Warm, well perfused, pulses equal bilateral upper and lower extremities, no edema; L upper arm with deformity 2/2 trauma  Skin: multiple healing contusions on face and chin; chin entirely healed, cheeks with scabs; well-healing scab on L knee    MEDICATIONS  (STANDING):  BACItracin   Ointment 1 Application(s) Topical daily  Biotene Dry Mouth Oral Rinse 5 milliLiter(s) Swish and Spit daily  carbidopa/levodopa  25/100 1 Tablet(s) Oral three times a day  chlorhexidine 2% Cloths 1 Application(s) Topical daily  erythromycin   Ointment 1 Application(s) Right EYE daily  folic acid Injectable 1 milliGRAM(s) IV Push daily  lactated ringers. 1000 milliLiter(s) (80 mL/Hr) IV Continuous <Continuous>  metoprolol tartrate 12.5 milliGRAM(s) Oral every 12 hours  piperacillin/tazobactam IVPB. 3.375 Gram(s) IV Intermittent every 6 hours  thiamine IVPB 500 milliGRAM(s) IV Intermittent three times a day    MEDICATIONS  (PRN):  acetaminophen  Suppository 650 milliGRAM(s) Rectal every 6 hours PRN For Temp greater than 38 C (100.4 F)      Allergies    aspirin (Unknown)    Intolerances        LABS:                        11.1   14.7  )-----------( 152      ( 29 Aug 2018 05:14 )             34.6     08-    140  |  106  |  27<H>  ----------------------------<  97  4.4   |  17<L>  |  1.47<H>    Ca    8.4      29 Aug 2018 05:13  Phos  2.6     -  Mg     2.2         TPro  5.8<L>  /  Alb  2.7<L>  /  TBili  0.8  /  DBili  x   /  AST  24  /  ALT  <5<L>  /  AlkPhos  54  08-    PT/INR - ( 29 Aug 2018 05:15 )   PT: 33.7 sec;   INR: 2.97          PTT - ( 29 Aug 2018 05:15 )  PTT:20.8 sec  Urinalysis Basic - ( 28 Aug 2018 13:54 )    Color: Yellow / Appearance: Cloudy / S.025 / pH: x  Gluc: x / Ketone: 15 mg/dL  / Bili: Negative / Urobili: 0.2 E.U./dL   Blood: x / Protein: 100 mg/dL / Nitrite: NEGATIVE   Leuk Esterase: Small / RBC: Many /HPF / WBC 5-10 /HPF   Sq Epi: x / Non Sq Epi: Moderate /HPF / Bacteria: Present /HPF        RADIOLOGY & ADDITIONAL TESTS:  Reviewed

## 2018-08-29 NOTE — PROGRESS NOTE ADULT - PROBLEM SELECTOR PLAN 3
- pt originally presented with UTI in AMS  - currently A&Ox1 to person only  - AMS likely 2/2 sepsis, will cont to monitor and treat underlying disease

## 2018-08-29 NOTE — SWALLOW FEES ASSESSMENT ADULT - PHARYNGEAL PHASE COMMENTS
Pharyngeal swallow trigger is timely, but overall swallow is discoordinated and noted for multiple swallows per bite/sip and mistimed airway protection. There is trace-mild penetration before and during the swallow with all textures, but worse for thin than solid, aspiration of thin liquid with a spontaneous cough x1, and pharyngeal residue, greater for solids than liquids. Pt is able to reduce bolus residue given multiple prompts to swallow again and with a NTL wash; however a trace amount of residue remains posterior to the arytenoids. Although there was no observed aspiration of pharyngeal residue on this exam, Pt is at increased risk of delayed aspiration or with additional PO trials provided (for example, during meals) or if he is reclined in the bed.

## 2018-08-29 NOTE — PROGRESS NOTE ADULT - PROBLEM SELECTOR PLAN 8
F: tolerating PO, no IVF  E: replete K<4, Mg<2  N: Dash/TLC    VTE Prophylaxis: Coumadin  C: Full Code  D: RMF F: LR @ 80cc/hr  E: replete K<4, Mg<2  N: for NG tube feeds start jevity 1.2 at 80    VTE Prophylaxis: Coumadin  C: Full Code  D: RMF    #Speech   - per speech FEES showed aspiration with thin liquid, residuals with nectar thick liquids   - d/t to pt's worsening of respiratory status will wait 24hrs before restarting oral diet  - will start mechanical soft nectar thick oral diet in 24hrs F: LR @ 80cc/hr  E: replete K<4, Mg<2  N: NPO    VTE Prophylaxis: Coumadin  C: Full Code  D: RMF    #Speech   - per speech FEES showed aspiration with thin liquid, residuals with nectar thick liquids   - d/t to pt's worsening of respiratory status will wait 24hrs before restarting oral diet  - will start mechanical soft nectar thick oral diet in 24hrs

## 2018-08-29 NOTE — PROGRESS NOTE ADULT - PROBLEM SELECTOR PLAN 1
8/27 pt found to have rigors and was ill appearing with tachycardia to 143, temp to 101.4, RR of 30 and oxygen saturation of 80% on RA. CXR showed new NORMA opacity consistent with aspiration PNA. Lactate 2.7 (trended down to 1.8), and leukocytosis to 11.3 with left shift. Pt was fluid resuscitated with 1L bolus NS and fluids continued. Pt was started on unasyn. Switched to zosyn today AM  - lactate improved  - leukocytosis to 14.3 today, continue to trend  - c/w zosyn  - f/u blood cx  - pt also received 7 day course Ceftriaxone 1g q24h last dose on 27th for sepsis 2/2 UTI

## 2018-08-29 NOTE — PROGRESS NOTE ADULT - ATTENDING COMMENTS
Pt awake and alert this AM and comfortable on high flow. Still not oriented to place. speech clearer and less secretions. On high flow 40 % FiO2. Lung exam with rhonchi on left and egophony at left base. CXR with LLL infiltrate. continue Zosyn and f/u Cx. Sinemet restarted via Dobhoff. FEES study today. LR decreased to 80 ml/hr and if feeds or po intake started then d/c. Will continue to hold Coumadin with elevated INR . OOB and PT consult. continue care on [facility].

## 2018-08-29 NOTE — PROVIDER CONTACT NOTE (MEDICATION) - ASSESSMENT
Upon initial assessment, patient observed to be NPO. MD made aware. As per OLGA De Leon, patient pending naso/orogastric tube placement.

## 2018-08-29 NOTE — SWALLOW BEDSIDE ASSESSMENT ADULT - COMMENTS
Received awake & alert, pleasant & cooperative. On HFNC. Pt is able to make basic wants/needs known, but is mildly confused. Speech intelligibility is mildly reduced and c/w Parkinsonian dysarthria characterized by low volume and rapid speech rate.
89M with Parkinson's (on Sinemet), Afib (Toprol XL and Coumadin), HTN, BPH and ETOH abuse who was BIBEMS after being found down at home. Patient was found face down on the floor by his cleaning lady today w/ periorbital swelling and bruising to bilateral cheeks. As per wife patient was last seen well 5 days PTA but she spoke to him on the phone 2 days PTA.

## 2018-08-29 NOTE — PROGRESS NOTE ADULT - PROBLEM SELECTOR PLAN 5
Fever, respiratory distress likely with aspiration during swallowing trial perhaps.  Attempt to control or treat delirium, continue Parkinsons meds Now afebrile  Better today

## 2018-08-29 NOTE — PROGRESS NOTE ADULT - PROBLEM SELECTOR PLAN 3
Remains confused.  treated with vitamins and fluids. Seems better today- time or high dose thiamine or both

## 2018-08-29 NOTE — SWALLOW BEDSIDE ASSESSMENT ADULT - SWALLOW EVAL: DIAGNOSIS
Pt has soft signs of aspiration with thin liquid as evidenced by inconsistent & subtle throat clear across trials. No change in voice or cough/throat clear with nectar-thick liquids. Case d/w Dr. Yañez. Will plan for FEES given suspected aspiration leading to ICU-admission and h/o Parkinson's dz.
Pt p/w severe oropharyngeal dysphagia characterized by decreased oral awareness and suspected severe swallow initiation delay at times, resulting in cough, which can be an indicator of aspiration. Overall, pt is at very high risk of prandial aspiration, across all consistencies, at this time.

## 2018-08-29 NOTE — SWALLOW FEES ASSESSMENT ADULT - DIAGNOSTIC IMPRESSIONS
Pt p/w a mild pharyngeal dysphagia characterized by a discoordinated swallow and mistimed airway protection resulting in laryngeal penetration across trials, aspiration of thin liquid, and pharyngeal residue after the swallow. Pt benefits from verbal cues to take secondary swallows and from a NTL-wash to reduce pharyngeal residue, however he remains at increased risk of aspiration due to cognitive impairment & reduced safety awareness.

## 2018-08-29 NOTE — CHART NOTE - NSCHARTNOTEFT_GEN_A_CORE
Admitting Diagnosis:   Patient is a 89y old  Male who presents with a chief complaint of Toxic Metabolic Encephalopathy, Acute Renal Failure, (21 Aug 2018 16:56)      PAST MEDICAL & SURGICAL HISTORY:  Parkinson's disease  BPH (benign prostatic hyperplasia)  HLD (hyperlipidemia)  HTN (hypertension)  Atrial fibrillation  No significant past surgical history      Current Nutrition Order:   Diet, NPO:   Except Medications (08-29-18 @ 11:23)      PO Intake: Good (%) [   ]  Fair (50-75%) [   ] Poor (<25%) [   ]- N/A NPO    GI Issues: No N/V/C/D reported at this time. +BM    Pain: No pain endorsed    Skin Integrity: bruising/abrasions on face    Labs:   08-29    140  |  106  |  27<H>  ----------------------------<  97  4.4   |  17<L>  |  1.47<H>    Ca    8.4      29 Aug 2018 05:13  Phos  2.6     08-29  Mg     2.2     08-29    TPro  5.8<L>  /  Alb  2.7<L>  /  TBili  0.8  /  DBili  x   /  AST  24  /  ALT  <5<L>  /  AlkPhos  54  08-28    CAPILLARY BLOOD GLUCOSE          Medications:  MEDICATIONS  (STANDING):  BACItracin   Ointment 1 Application(s) Topical daily  Biotene Dry Mouth Oral Rinse 5 milliLiter(s) Swish and Spit daily  carbidopa/levodopa  25/100 1 Tablet(s) Oral three times a day  chlorhexidine 2% Cloths 1 Application(s) Topical daily  folic acid Injectable 1 milliGRAM(s) IV Push daily  lactated ringers. 1000 milliLiter(s) (80 mL/Hr) IV Continuous <Continuous>  metoprolol tartrate 12.5 milliGRAM(s) Oral every 12 hours  piperacillin/tazobactam IVPB. 3.375 Gram(s) IV Intermittent every 6 hours  thiamine IVPB 500 milliGRAM(s) IV Intermittent three times a day    MEDICATIONS  (PRN):  acetaminophen  Suppository 650 milliGRAM(s) Rectal every 6 hours PRN For Temp greater than 38 C (100.4 F)      Weight: 85kg  Daily     Daily     Weight Change: No new weights recorded since admit     Estimated energy needs: ABW used for calculations as pt between % of IBW. Needs estimated for maintenance in older adults; adjusted for sepsis  Calories: 25-30 kcal/kg = 8935-2746 kcal/day  Protein: 1.2-1.4 g/kg = 102-119 g protein/day  Fluids: 30-35 mL/kg = 8287-4916 mL/day    Subjective: 89 yo/male with PMHx Parkinsons, Afib, HTN, BPH, EtOH abuse, presented after being found down at home. Found to be in severe sepsis and toxic metabolic encephalopathy possible 2/2 UTI. On 8/28 patient developed rigors, hypotension, afib- transferred to MICU s/p rapid response. Pt seen in room, awake, alert, breathing comfortably on HFNC. Pt notably still with AMS and difficult to understand, but able to respond to basic questions. NGT in place for possible nutrition support and medication administration. Pt denies N/V/C/D at this time and states that he had a BM earlier. He denies pain. SLP entered room to perform swallow exam at this time- per note, pt recommended to be NPO with f/u FEES study. Palliative following patient to help establish goals of care. Will continue to keep nutrition aligned with GOC at all times.      Previous Nutrition Diagnosis:  Inadequate energy intake RT current NPO status 2/2 dysphagia and AMS AEB 0% of EER being met at this time    Active [X   ]  Resolved [   ]    If resolved, new PES:     Goal: Nutrition will be resumed within 48hrs     Recommendations:  1. Until FEES is performed, recommend short-term nutrition support via NGT: Jevity 1.2 Yung @ 80mL/hr x 24hrs via NGT to provide: 1920mL TV, 2304 kcal, 106.5g protein, 1549mL free H2O, 192% RDI, 1.25g/kg ABW protein. Recommend starting at 20mL/hr and advancing by [id number] to goal as tolerated. Additional free H2O per team. Monitor for s/s intolerance; maintain aspiration precautions at all times.  2. F/u SLP recommendations for possible PO candidacy   3. Monitor lytes and replete prn.   4. Keep nutrition aligned with GOC at all times    Education: N/A - not appropriate at this time 2/2 AMS    Risk Level: High [ X  ] Moderate [   ] Low [   ]

## 2018-08-29 NOTE — PROGRESS NOTE ADULT - PROBLEM SELECTOR PLAN 4
has been back on Sinemet for several days.  tremor persists.  Aspiration may in part be due to Parkinsons, but also due to derlirium.  Continue Sinement  To have a FEES

## 2018-08-29 NOTE — PROGRESS NOTE ADULT - SUBJECTIVE AND OBJECTIVE BOX
SADIA LUTZ   [id number]     ([id number]):   CC: confusion  HPI:  89M with Parkinson's (on Sinemet), Afib (Toprol XL and Coumadin), HTN, BPH and ETOH abuse who was BIBEMS after being found down at home. Wife was out of town caring for another sick family member.  He was alone and probably fell 24-48 hour earlier.  On admission was in renal failure and found to have a UTI.  Toxic screen was negative for drugs or alcohol.    Subjective:  Yeserday had a rapid response called at 12:30 PM Found to be in afib, to have Temp of 100.4 and respiratory distress.  Presumed aspiration, continued confusion, but remained alert and talking during the rapid response.  Treated for possible sepsis and transferred to the ICU.  Met with patients wife discussing treatment decisions.  Given his high level of function just 10 days ago she would like us to continue interventions including intubation, at this time    Patient noted to have diarrhea as previously reported by wife    Today patient is more alert.  Did better on his swallow eval  Is to have further eval for safety of eating.  NG tube placed for meds.  He seems calmer and more cooperative He is to be [facility] to [facility]      ROS:    Unable to obtain due to: patient is unreliable and inconsistent historian,  but presently denies pain, but states he doesn't feel well.                    Dyspnea (Carissa 0-10):    doesn't really answer                    N/V (Y/N):                N              Secretions (Y/N) :    N            Agitation(Y/N):        N  Pain (Y/N):     no  -Provocation/Palliation:  -Quality/Quantity:  -Radiating:  -Severity:  -Timing/Frequency:  -Impact on ADLs:    unable to complete ROS due to severity of acute illness    Allergies    aspirin (Unknown)    Intolerances        Opiate Naive (Y/N): yes, med list obtained from patient's pharmacy      Medications:      MEDICATIONS  (STANDING):  BACItracin   Ointment 1 Application(s) Topical daily  Biotene Dry Mouth Oral Rinse 5 milliLiter(s) Swish and Spit daily  carbidopa/levodopa  25/100 1 Tablet(s) Oral three times a day  chlorhexidine 2% Cloths 1 Application(s) Topical daily  erythromycin   Ointment 1 Application(s) Right EYE daily  folic acid Injectable 1 milliGRAM(s) IV Push daily  lactated ringers. 1000 milliLiter(s) (80 mL/Hr) IV Continuous <Continuous>  metoprolol tartrate 12.5 milliGRAM(s) Oral every 12 hours  piperacillin/tazobactam IVPB. 3.375 Gram(s) IV Intermittent every 6 hours  thiamine IVPB 500 milliGRAM(s) IV Intermittent three times a day    MEDICATIONS  (PRN):  acetaminophen  Suppository 650 milliGRAM(s) Rectal every 6 hours PRN For Temp greater than 38 C (100.4 F)                Labs:                      11.1   14.7  )-----------( 152      ( 29 Aug 2018 05:14 )             34.6          08-    140  |  106  |  27<H>  ----------------------------<  97  4.4   |  17<L>  |  1.47<H>    Ca    8.4      29 Aug 2018 05:13  Phos  2.6       Mg     2.2         TPro  5.8<L>  /  Alb  2.7<L>  /  TBili  0.8  /  DBili  x   /  AST  24  /  ALT  <5<L>  /  AlkPhos  54                                            Imaging reviews     < from: Xray Chest 1 View-PORTABLE IMMEDIATE (18 @ 12:47) >  EXAM:  XR CHEST PORTABLE IMMED 1V                          PROCEDURE DATE:  2018          INTERPRETATION:    INDICATION: rigors, febrile 100.8    TECHNIQUE: Chest, single portable AP view on 2018 12:47 PM     COMPARISON: 2018    FINDINGS:   The enteric feeding tube has been removed.  There is a bandlike opacity the left lung base, consistent with discoid   atelectasis.  No evidence of focal airspace consolidation in either lung.    Costophrenic angles are grossly sharp bilaterally, without sizable   pleural effusions.   No pneumothorax is seen.  Cardiomediastinal silhouette is grossly stable in appearance.   Visualized bony thorax is unchanged in appearance.       IMPRESSION:   Discoid atelectasis at the left lung base.    Stable cardiomediastinal silhouette.    < end of copied text >                                   PEx:  Vital Signs Last 24 Hrs  T(C): 37.7 (28 Aug 2018 14:29), Max: 38.6 (28 Aug 2018 13:01)  T(F): 99.8 (28 Aug 2018 14:29), Max: 101.4 (28 Aug 2018 13:01)  HR: 110 (28 Aug 2018 15:00) (77 - 143)  BP: 122/82 (28 Aug 2018 15:00) (72/52 - 141/72)  BP(mean): 95 (28 Aug 2018 15:00) (58 - 95)  RR: 22 (28 Aug 2018 14:00) (18 - 30)  SpO2: 100% (28 Aug 2018 15:00) (93% - 100%)        General: elderly plethoric gentleman in NAD, sometimes difficult to understand secondary to dry mouth. But certainly more awake and interactive   HEENT:  non-icteric sclera, multiple healing  bruises abrasion on chin and both cheeks, dry mucous membranes on high flow nasal canulla  Neck: no adenopathy  CVS: irreg  distant, no murmur heard tachycardia  Resp: decreased BS both bases- just been suctioned  GI:  protuberant, normal BS, non-tender  :  normal  Musc:   equal strength, no significant wasting, no joint erythema or effusion  Neuro: non focal, oriented to person   Psych:   confused but cooperative  Skin: diffuse actinic keratoses and evidence of sun damage, multiple abrasions including both knees  Lymph:no adenopathy  Preadmit Karnofsky:  	                    presumed 60-70%           Current Karnofsky:     30%  Cachexia (Y/N): no  BMI:    Advanced Directives:     Full Code      Decision maker: Patient presently not able to make complex medical decisions  Legal surrogate: wife Angle Lutz    Social History: .  Wife is about 20 years younger.  He was a , she was an ad  for the [employer], no Children.  He is [place]  Patient and wife are residents of [facility]    GOALS OF CARE DISCUSSION             Documentation of GOC: to recover from this event, to go to rehab.  Wife presently overwhelmed regarding family illness  Hoping for improvement in strength and mental status. See discussion above under subjective	          REFERRALS	        Palliative Med               PT/OT

## 2018-08-29 NOTE — PROGRESS NOTE ADULT - SUBJECTIVE AND OBJECTIVE BOX
INTERVAL HPI/OVERNIGHT EVENTS: hypotensive briefly last night, resolved with fluids.     SUBJECTIVE: Patient seen and examined at bedside. Pt more awake today but continues to be A&Ox1 to person only. Pt denies fever, chills, chest pain, SOB, n/v/c/d/abd pain. He does complain of minor neck pain.     CONSTITUTIONAL: No weakness, fevers or chills  EYES/ENT: No visual changes;  No vertigo or throat pain   NECK: No pain or stiffness  RESPIRATORY: No cough, wheezing, hemoptysis; No shortness of breath  CARDIOVASCULAR: No chest pain or palpitations  GASTROINTESTINAL: No abdominal or epigastric pain. No nausea, vomiting, or hematemesis; No diarrhea or constipation. No melena or hematochezia.  GENITOURINARY: No dysuria, frequency or hematuria  NEUROLOGICAL: No numbness or weakness  SKIN: No itching, rashes    OBJECTIVE:    VITAL SIGNS:  ICU Vital Signs Last 24 Hrs  T(C): 36.4 (29 Aug 2018 09:26), Max: 38.6 (28 Aug 2018 13:01)  T(F): 97.6 (29 Aug 2018 09:26), Max: 101.4 (28 Aug 2018 13:01)  HR: 92 (29 Aug 2018 12:00) (85 - 112)  BP: 110/67 (29 Aug 2018 12:00) (72/52 - 135/90)  BP(mean): 83 (29 Aug 2018 12:00) (58 - 105)  ABP: --  ABP(mean): --  RR: 0 (29 Aug 2018 12:00) (0 - 27)  SpO2: 100% (29 Aug 2018 12:00) (99% - 100%)         @ :  -   @ 07:00  --------------------------------------------------------  IN: 3750 mL / OUT: 1005 mL / NET: 2745 mL     @ 07: @ 12:46  --------------------------------------------------------  IN: 600 mL / OUT: 75 mL / NET: 525 mL      CAPILLARY BLOOD GLUCOSE      POCT Blood Glucose.: 99 mg/dL (28 Aug 2018 12:17)      PHYSICAL EXAM:    General: NAD  HEENT: NC/AT; PERRL, clear conjunctiva  Neck: supple  Respiratory: CTA b/l  Cardiovascular: +S1/S2; RRR  Abdomen: soft, NT/ND; +BS x4  Extremities: WWP, 2+ peripheral pulses b/l; no LE edema  Skin: normal color and turgor; no rash  Neurological:    MEDICATIONS:  MEDICATIONS  (STANDING):  BACItracin   Ointment 1 Application(s) Topical daily  Biotene Dry Mouth Oral Rinse 5 milliLiter(s) Swish and Spit daily  carbidopa/levodopa  25/100 1 Tablet(s) Oral three times a day  chlorhexidine 2% Cloths 1 Application(s) Topical daily  folic acid Injectable 1 milliGRAM(s) IV Push daily  lactated ringers. 1000 milliLiter(s) (80 mL/Hr) IV Continuous <Continuous>  metoprolol tartrate 12.5 milliGRAM(s) Oral every 12 hours  piperacillin/tazobactam IVPB. 3.375 Gram(s) IV Intermittent every 6 hours  thiamine IVPB 500 milliGRAM(s) IV Intermittent three times a day    MEDICATIONS  (PRN):  acetaminophen  Suppository 650 milliGRAM(s) Rectal every 6 hours PRN For Temp greater than 38 C (100.4 F)      ALLERGIES:  Allergies    aspirin (Unknown)    Intolerances        LABS:                        11.1   14.7  )-----------( 152      ( 29 Aug 2018 05:14 )             34.6         140  |  106  |  27<H>  ----------------------------<  97  4.4   |  17<L>  |  1.47<H>    Ca    8.4      29 Aug 2018 05:13  Phos  2.6       Mg     2.2         TPro  5.8<L>  /  Alb  2.7<L>  /  TBili  0.8  /  DBili  x   /  AST  24  /  ALT  <5<L>  /  AlkPhos  54  08-28    PT/INR - ( 29 Aug 2018 05:15 )   PT: 33.7 sec;   INR: 2.97          PTT - ( 29 Aug 2018 05:15 )  PTT:20.8 sec  Urinalysis Basic - ( 28 Aug 2018 13:54 )    Color: Yellow / Appearance: Cloudy / S.025 / pH: x  Gluc: x / Ketone: 15 mg/dL  / Bili: Negative / Urobili: 0.2 E.U./dL   Blood: x / Protein: 100 mg/dL / Nitrite: NEGATIVE   Leuk Esterase: Small / RBC: Many /HPF / WBC 5-10 /HPF   Sq Epi: x / Non Sq Epi: Moderate /HPF / Bacteria: Present /HPF        RADIOLOGY & ADDITIONAL TESTS: Reviewed. INTERVAL HPI/OVERNIGHT EVENTS: SBP dropped into 80's overnight with MAP that remained above 70 briefly last night, treated with additional fluids.     SUBJECTIVE: Patient seen and examined at bedside. Pt more awake today but continues to be A&Ox1 to person only. Pt denies fever, chills, chest pain, SOB, n/v/c/d/abd pain. He does complain of minor neck pain. Speech clearer.    CONSTITUTIONAL: No weakness, fevers or chills  EYES/ENT: No visual changes;  No vertigo or throat pain   NECK: No pain or stiffness  RESPIRATORY: No cough, wheezing, hemoptysis; No shortness of breath  CARDIOVASCULAR: No chest pain or palpitations  GASTROINTESTINAL: No abdominal or epigastric pain. No nausea, vomiting, or hematemesis; No diarrhea or constipation. No melena or hematochezia.  GENITOURINARY: No dysuria, frequency or hematuria  NEUROLOGICAL: No numbness or weakness  SKIN: No itching, rashes    OBJECTIVE:    VITAL SIGNS:  ICU Vital Signs Last 24 Hrs  T(C): 36.4 (29 Aug 2018 09:26), Max: 38.6 (28 Aug 2018 13:01)  T(F): 97.6 (29 Aug 2018 09:26), Max: 101.4 (28 Aug 2018 13:01)  HR: 92 (29 Aug 2018 12:00) (85 - 112)  BP: 110/67 (29 Aug 2018 12:00) (72/52 - 135/90)  BP(mean): 83 (29 Aug 2018 12:00) (58 - 105)  ABP: --  ABP(mean): --  RR: 0 (29 Aug 2018 12:00) (0 - 27)  SpO2: 100% (29 Aug 2018 12:00) (99% - 100%)         @ :  -   @ 07:00  --------------------------------------------------------  IN: 3750 mL / OUT: 1005 mL / NET: 2745 mL     @ 07: @ 12:46  --------------------------------------------------------  IN: 600 mL / OUT: 75 mL / NET: 525 mL      CAPILLARY BLOOD GLUCOSE      POCT Blood Glucose.: 99 mg/dL (28 Aug 2018 12:17)      PHYSICAL EXAM:    General: NAD; less secretions  HEENT: NC/AT; PERRL, right conjunctiva injected with discharge  Neck: supple  Respiratory: rhonchi L>R with egophony left base  Cardiovascular: +S1/S2; RRR  Abdomen: soft, NT/ND; +BS x4  Extremities: WWP, 2+ peripheral pulses b/l; no LE edema  Skin: areas of ecchymosis; no rash  Neurological:    MEDICATIONS:  MEDICATIONS  (STANDING):  BACItracin   Ointment 1 Application(s) Topical daily  Biotene Dry Mouth Oral Rinse 5 milliLiter(s) Swish and Spit daily  carbidopa/levodopa  25/100 1 Tablet(s) Oral three times a day  chlorhexidine 2% Cloths 1 Application(s) Topical daily  folic acid Injectable 1 milliGRAM(s) IV Push daily  lactated ringers. 1000 milliLiter(s) (80 mL/Hr) IV Continuous <Continuous>  metoprolol tartrate 12.5 milliGRAM(s) Oral every 12 hours  piperacillin/tazobactam IVPB. 3.375 Gram(s) IV Intermittent every 6 hours  thiamine IVPB 500 milliGRAM(s) IV Intermittent three times a day    MEDICATIONS  (PRN):  acetaminophen  Suppository 650 milliGRAM(s) Rectal every 6 hours PRN For Temp greater than 38 C (100.4 F)      ALLERGIES:  Allergies    aspirin (Unknown)    Intolerances        LABS:                        11.1   14.7  )-----------( 152      ( 29 Aug 2018 05:14 )             34.6     08-    140  |  106  |  27<H>  ----------------------------<  97  4.4   |  17<L>  |  1.47<H>    Ca    8.4      29 Aug 2018 05:13  Phos  2.6     08-  Mg     2.2     -    TPro  5.8<L>  /  Alb  2.7<L>  /  TBili  0.8  /  DBili  x   /  AST  24  /  ALT  <5<L>  /  AlkPhos  54  08-28    PT/INR - ( 29 Aug 2018 05:15 )   PT: 33.7 sec;   INR: 2.97          PTT - ( 29 Aug 2018 05:15 )  PTT:20.8 sec  Urinalysis Basic - ( 28 Aug 2018 13:54 )    Color: Yellow / Appearance: Cloudy / S.025 / pH: x  Gluc: x / Ketone: 15 mg/dL  / Bili: Negative / Urobili: 0.2 E.U./dL   Blood: x / Protein: 100 mg/dL / Nitrite: NEGATIVE   Leuk Esterase: Small / RBC: Many /HPF / WBC 5-10 /HPF   Sq Epi: x / Non Sq Epi: Moderate /HPF / Bacteria: Present /HPF        RADIOLOGY & ADDITIONAL TESTS: Reviewed. HOSPITAL COURSE  89 y/o M with atrial fibrillation (on Toprol XL and Coumadin), HTN, Parkinson's (on Sinemet), BPH, and ETOH abuse, initially BIBEMS to [facility] on  after being found down at home. Last seen well five days prior. Patient was altered on presentation an unable to provide history. At [facility] ED T 96.5,  in Afib, /91, RR18, O2 100%. Labs showed BUN/Cr significantly elevated from baseline. UA positive for LE, WBC, nitrites, and bacteria. Started on ceftriaxone and given 1L NS x 2. Started on bicarb drip and admitted to [facility] for toxic metabolic encephalopathy and sepsis 2/2 UTI.     Course was complicated by afib with RVR to 160s due to missed toprol doses, resolved with lopressor IV. On  patient was assessed by speech and swallow and recommended NPO. NGT was placed for medication administration and Sinemet was restarted. Toprol XL unable to be crushed, so patient was switched to lopressor 100mg BID with HR normalizing. Pt also found to be hypernatremic to 153. Pt was switched to D5 with resolution of hypernatremia. During course on [facility], mental status continued to improve to baseline. Patient finished a seven-day course of ceftriaxone for UTI, although organisms were never isolated on blood cultures or urine cultures. SW started planning for ALBIN.    On , was called to bedside for rigors. Patient was awake and answering questions, without any focal complaints. Exam notable for ill appearing, elderly frail man, tachycardic, CTA b/l, abdomen soft, NT/ND, extremities warm and well perfused. VS T 100.8 rectal, HR 130s, /70, O2 sat 80s on ambient air. Patient was placed on nasal cannula, then to NRB with improvement to mid 90s O2 saturation. EKG showed afib with RVR to the 130-140s. STAT labs and blood cultures were drawn and CXR was ordered STAT. Patient was given IV tylenol, cold packs, and sepsis bundle was initiated -- 500 cc of fluid was given. At that time, a rapid response was called. Attending, pulm fellow, and ICU consult team were all at bedside. Blood pressures were inconsistent, reading systolic 60s at one point on one arm, but as high as 190s systolic on the other. A manual BP confirmed systolic BP ranging from 120-140.     /-. Pt admitted for ICU for observation. Abx coverage changed to zosyn. Pt started on fluids for BP support. BCx, UCx, and sputum Cx spent to lab. Sputum +staph. BCx and UCx pending. NG tube placed. See plan for nutrition and speech recommendations. Pt ready for transfer to 7L step down unit.       INTERVAL HPI/OVERNIGHT EVENTS: SBP dropped into 80's overnight with MAP that remained above 70 briefly last night, treated with additional fluids.     SUBJECTIVE: Patient seen and examined at bedside. Pt more awake today but continues to be A&Ox1 to person only. Pt denies fever, chills, chest pain, SOB, n/v/c/d/abd pain. He does complain of minor neck pain. Speech clearer.    CONSTITUTIONAL: No weakness, fevers or chills  EYES/ENT: No visual changes;  No vertigo or throat pain   NECK: No pain or stiffness  RESPIRATORY: No cough, wheezing, hemoptysis; No shortness of breath  CARDIOVASCULAR: No chest pain or palpitations  GASTROINTESTINAL: No abdominal or epigastric pain. No nausea, vomiting, or hematemesis; No diarrhea or constipation. No melena or hematochezia.  GENITOURINARY: No dysuria, frequency or hematuria  NEUROLOGICAL: No numbness or weakness  SKIN: No itching, rashes    OBJECTIVE:    VITAL SIGNS:  ICU Vital Signs Last 24 Hrs  T(C): 36.4 (29 Aug 2018 09:26), Max: 38.6 (28 Aug 2018 13:01)  T(F): 97.6 (29 Aug 2018 09:26), Max: 101.4 (28 Aug 2018 13:01)  HR: 92 (29 Aug 2018 12:00) (85 - 112)  BP: 110/67 (29 Aug 2018 12:00) (72/52 - 135/90)  BP(mean): 83 (29 Aug 2018 12:00) (58 - 105)  ABP: --  ABP(mean): --  RR: 0 (29 Aug 2018 12:00) (0 - 27)  SpO2: 100% (29 Aug 2018 12:00) (99% - 100%)         @ 07:01  -   @ 07:00  --------------------------------------------------------  IN: 3750 mL / OUT: 1005 mL / NET: 2745 mL     @ 07:01  -   @ 12:46  --------------------------------------------------------  IN: 600 mL / OUT: 75 mL / NET: 525 mL      CAPILLARY BLOOD GLUCOSE      POCT Blood Glucose.: 99 mg/dL (28 Aug 2018 12:17)      PHYSICAL EXAM:    General: NAD; less secretions  HEENT: NC/AT; PERRL, right conjunctiva injected with discharge  Neck: supple  Respiratory: rhonchi L>R with egophony left base  Cardiovascular: +S1/S2; RRR  Abdomen: soft, NT/ND; +BS x4  Extremities: WWP, 2+ peripheral pulses b/l; no LE edema  Skin: areas of ecchymosis; no rash  Neurological:    MEDICATIONS:  MEDICATIONS  (STANDING):  BACItracin   Ointment 1 Application(s) Topical daily  Biotene Dry Mouth Oral Rinse 5 milliLiter(s) Swish and Spit daily  carbidopa/levodopa  25/100 1 Tablet(s) Oral three times a day  chlorhexidine 2% Cloths 1 Application(s) Topical daily  folic acid Injectable 1 milliGRAM(s) IV Push daily  lactated ringers. 1000 milliLiter(s) (80 mL/Hr) IV Continuous <Continuous>  metoprolol tartrate 12.5 milliGRAM(s) Oral every 12 hours  piperacillin/tazobactam IVPB. 3.375 Gram(s) IV Intermittent every 6 hours  thiamine IVPB 500 milliGRAM(s) IV Intermittent three times a day    MEDICATIONS  (PRN):  acetaminophen  Suppository 650 milliGRAM(s) Rectal every 6 hours PRN For Temp greater than 38 C (100.4 F)      ALLERGIES:  Allergies    aspirin (Unknown)    Intolerances        LABS:                        11.1   14.7  )-----------( 152      ( 29 Aug 2018 05:14 )             34.6         140  |  106  |  27<H>  ----------------------------<  97  4.4   |  17<L>  |  1.47<H>    Ca    8.4      29 Aug 2018 05:13  Phos  2.6       Mg     2.2         TPro  5.8<L>  /  Alb  2.7<L>  /  TBili  0.8  /  DBili  x   /  AST  24  /  ALT  <5<L>  /  AlkPhos  54      PT/INR - ( 29 Aug 2018 05:15 )   PT: 33.7 sec;   INR: 2.97          PTT - ( 29 Aug 2018 05:15 )  PTT:20.8 sec  Urinalysis Basic - ( 28 Aug 2018 13:54 )    Color: Yellow / Appearance: Cloudy / S.025 / pH: x  Gluc: x / Ketone: 15 mg/dL  / Bili: Negative / Urobili: 0.2 E.U./dL   Blood: x / Protein: 100 mg/dL / Nitrite: NEGATIVE   Leuk Esterase: Small / RBC: Many /HPF / WBC 5-10 /HPF   Sq Epi: x / Non Sq Epi: Moderate /HPF / Bacteria: Present /HPF        RADIOLOGY & ADDITIONAL TESTS: Reviewed.

## 2018-08-29 NOTE — SWALLOW FEES ASSESSMENT ADULT - RECOMMENDED CONSISTENCY
Consider resuming an oral diet of mechanical soft solids and nectar-thick liquids with strict adherence to safe-feeding recommendations if c/w Pt's GOC and if it is deemed safe for Pt to tolerate any amount of aspiration. If goal of care is to avoid prandial aspiration at all costs and/or Pt is deemed unsafe for potential trace aspiration of pharyngeal residue, consider continuing the NGT for another 2-3 days while Pt's medical status continues to improve.

## 2018-08-29 NOTE — PROVIDER CONTACT NOTE (OTHER) - SITUATION
Upon reassessment, patient noted with IV site to left arm infiltrated. Redness and swelling noted to site. Patient complaining of pain to site.

## 2018-08-29 NOTE — PROGRESS NOTE ADULT - PROBLEM SELECTOR PLAN 4
Pt has h/o of a-fib on home Coumadin and Toprol XL 200mg. Was difficult to rate control earlier this admission likely 2/2 missed doses of Toprol XL  - on lopressor for rate control. Pt has been tachycardic 90s-120s  - pt unable to tolerate PO meds at this time, has NGT. Will continue lopressor via NGT and f/u INRs for need for coumadin vs. lovenox for anticoagulation

## 2018-08-29 NOTE — SWALLOW FEES ASSESSMENT ADULT - RECOMMENDED FEEDING/EATING TECHNIQUES
oral hygiene/small sips/bites/Feed slowly, only when awake & alert, small amounts with cue to clear throat & reswallow periodically throughout the meal/no straws/position upright (90 degrees)

## 2018-08-29 NOTE — SWALLOW FEES ASSESSMENT ADULT - ORAL PHASE COMMENTS
Bolus acceptance was WFL. Containment was mildly impaired and characterized by inconsistent anterior loss with liquids and puree, suspect due to cognitive impairment and not labial weakness.

## 2018-08-29 NOTE — SWALLOW BEDSIDE ASSESSMENT ADULT - NS SPL SWALLOW CLINIC TRIAL FT
Hyolaryngeal excursion palpated during swallow trigger. Suspect mistimed airway protection in setting of Parkinson's dz. Pt would benefit from FEES prior to starting an oral diet.
No further PO trials (including other consistencies) were administered given decreased awareness and pt's inability to be redirected/cued.

## 2018-08-29 NOTE — PROVIDER CONTACT NOTE (MEDICATION) - ACTION/TREATMENT ORDERED:
MD aware. Awaiting MD to place tube for medication administration. No further interventions made at this time.

## 2018-08-29 NOTE — SWALLOW BEDSIDE ASSESSMENT ADULT - SLP PERTINENT HISTORY OF CURRENT PROBLEM
72-yo man adm with h/o Parkinson's dz, ETOH-abuse, adm after being found down at home for unknown period of time. Pt has been followed by this c with prior rec for NPO on 8/23 & 8/24. Pt was started on oral diet over past wknd, transferred to ICU 8/28 with concern for decline in condition & possible aspiration.

## 2018-08-29 NOTE — SWALLOW BEDSIDE ASSESSMENT ADULT - ORAL PHASE
Decreased awareness of PO in oral cavity as pt repeatedly attempted to speak w PO in oral cavity; pt was unable to be appropriately redirected
Within functional limits

## 2018-08-29 NOTE — SWALLOW FEES ASSESSMENT ADULT - COMMENTS
Trace, clear pooled secretions in the laryngeal vestibule at baseline. Purpose and goals of FEES were explained to patient who provided verbal consent to participate. He tolerated the presence & passing of the scope without difficulty.

## 2018-08-29 NOTE — PROGRESS NOTE ADULT - ASSESSMENT
89 yo with Parkinsons found face down at home following a fall 24-48 hours previously.  Dehydrated hypotensive, renal failure.  Perhaps some role of alcohol although screen was negative.  Renal failure had improved but patient reemained confused.  Now with likely aspiration and sepsis transferred to ICU doing better

## 2018-08-29 NOTE — PROGRESS NOTE ADULT - PROBLEM SELECTOR PLAN 2
Pt has   - f/u sputum cx  - f/u blood cx  - continue to trend CBC  - c/w zosyn    # Hypoxic hypercapnic respiratory failure likely 2/2 PNA  -Pt found to have PNA w/ increased respiratory needs. ABG showed met acidosis with resp alkalosis  - saturation stable on high flow NC  - will cont with oxygen supplementation and pulmonary toilet

## 2018-08-29 NOTE — PROGRESS NOTE ADULT - PROBLEM SELECTOR PLAN 5
In the setting of dehydration and obstruction from BPH, underlying CKD 3  - Cr today 1.47  - cont IVF and trend BMP's

## 2018-08-29 NOTE — PROGRESS NOTE ADULT - ASSESSMENT
89 y/o M with hx of Parkinson disease, HTN, BPH, ETOH abuse, afib on Coumadin, found down at home for unknown period of time (up to 2 days) admitted for toxic metabolic encephalopathy and acute renal failure and found to have UTI.    Infectious Disease  # Sepsis  - UTI POA possible source, being treated with Ceftriaxone 1g q24h last dose on 27th.  - previous urine and blood cultures have negative since pt was treated with rocephin  - pt had episode of hypotension overnight SBP in the 80's, s/p fluid bolus now normotensive  - f/u repeat urine and blood culture  - IVF lactated ringers at 80cc/hr  - zosyn for abx coverage  - pt safe to transfer to step down unit 7L    Pulmonology  # Acute respiratory distress  - CXR + for atelectasis   - ABG +met acidosis with resp alkalosis  - saturation stable on high flow NC  - will cont with oxygen supplementation and pulmonary toilet.     Neurology  # Toxic metabolic encephalopathy  - pt originally presented with UTI in AMS  - currently A&Ox1 to person only  - AMS likely 2/2 sepsis, will cont to monitor and treat underlying disease    # Parkinson's disease.    - c/w home Sinemet    Nutrition  #Speech   - per speech FEES showed aspiration with thin liquid, residuals with nectar thick liquids   - d/t to pt's worsening of respiratory status will wait 24hrs before restarting oral diet  - will start mechanical soft nectar thick oral diet in 24hrs  # Nutrition  - for NG tube feeds start jevity 1.2 at 80  	  Renal  # Acute renal failure   - in setting of dehydration, obstruction from BPH, underlying CKD 3  - Cr today 1.47  - cont IVF and trend BMP's      #BPH  - continue home tamsulosin 0.4 mg daily  - continue home finasteride      Cardiology  # Chronic atrial fibrillation.   - h/o of a-fib on home Coumadin and Toprol XL 200mg  - on lopressor for rate control. Pt has been mildy tachy in 120's to 90's  - INR was elevated d/t coumadin, will transition to hep gtt once INR <2.0  - f/u coags and start heparin when INR <2.0    # Decondition due to acute illness  - PT recommends ALBIN  - appreciate PT recs  - appreciate SW, CC assistance with placement  - appreciate palliative assistance.       # Essential hypertension.   - hold home amlodipine 5mg  - continue to hold home benazepril until NILS and hypotension fully resolved.    Psychiatry   # Alcohol abuse  - CIWA consistently negative; last drink >72 hours prior to admission      Heme/Onc  # Thrombocytopenia  - likely in setting of etoh abuse  - trend CBC    # Anemia   - stable  - follow CBC. 89 y/o M with hx of Parkinson disease, HTN, BPH, ETOH abuse, afib on Coumadin, found down at home for unknown period of time (up to 2 days) admitted for toxic metabolic encephalopathy and acute renal failure and found to have UTI s/p 7 day tx w/ ceftriaxone now with NORMA PNA.

## 2018-08-30 DIAGNOSIS — Z78.9 OTHER SPECIFIED HEALTH STATUS: ICD-10-CM

## 2018-08-30 DIAGNOSIS — F05 DELIRIUM DUE TO KNOWN PHYSIOLOGICAL CONDITION: ICD-10-CM

## 2018-08-30 DIAGNOSIS — Z71.89 OTHER SPECIFIED COUNSELING: ICD-10-CM

## 2018-08-30 DIAGNOSIS — Z66 DO NOT RESUSCITATE: ICD-10-CM

## 2018-08-30 LAB
-  CEFAZOLIN: SIGNIFICANT CHANGE UP
-  CLINDAMYCIN: SIGNIFICANT CHANGE UP
-  DAPTOMYCIN: SIGNIFICANT CHANGE UP
-  ERYTHROMYCIN: SIGNIFICANT CHANGE UP
-  LINEZOLID: SIGNIFICANT CHANGE UP
-  OXACILLIN: SIGNIFICANT CHANGE UP
-  PENICILLIN: SIGNIFICANT CHANGE UP
-  RIFAMPIN: SIGNIFICANT CHANGE UP
-  TETRACYCLINE: SIGNIFICANT CHANGE UP
-  TRIMETHOPRIM/SULFAMETHOXAZOLE: SIGNIFICANT CHANGE UP
-  VANCOMYCIN: SIGNIFICANT CHANGE UP
-  VANCOMYCIN: SIGNIFICANT CHANGE UP
ANION GAP SERPL CALC-SCNC: 15 MMOL/L — SIGNIFICANT CHANGE UP (ref 5–17)
APTT BLD: 31.9 SEC — SIGNIFICANT CHANGE UP (ref 27.5–37.4)
BASOPHILS NFR BLD AUTO: 0.4 % — SIGNIFICANT CHANGE UP (ref 0–2)
BUN SERPL-MCNC: 20 MG/DL — SIGNIFICANT CHANGE UP (ref 7–23)
C DIFF GDH STL QL: NEGATIVE — SIGNIFICANT CHANGE UP
C DIFF GDH STL QL: SIGNIFICANT CHANGE UP
CALCIUM SERPL-MCNC: 8.1 MG/DL — LOW (ref 8.4–10.5)
CHLORIDE SERPL-SCNC: 108 MMOL/L — SIGNIFICANT CHANGE UP (ref 96–108)
CO2 SERPL-SCNC: 15 MMOL/L — LOW (ref 22–31)
CREAT SERPL-MCNC: 1.33 MG/DL — HIGH (ref 0.5–1.3)
CULTURE RESULTS: SIGNIFICANT CHANGE UP
CULTURE RESULTS: SIGNIFICANT CHANGE UP
EOSINOPHIL NFR BLD AUTO: 6.4 % — HIGH (ref 0–6)
GLUCOSE BLDC GLUCOMTR-MCNC: 93 MG/DL — SIGNIFICANT CHANGE UP (ref 70–99)
GLUCOSE SERPL-MCNC: 97 MG/DL — SIGNIFICANT CHANGE UP (ref 70–99)
HCT VFR BLD CALC: 33.1 % — LOW (ref 39–50)
HGB BLD-MCNC: 10.5 G/DL — LOW (ref 13–17)
INR BLD: 3.02 — HIGH (ref 0.88–1.16)
LYMPHOCYTES # BLD AUTO: 7.5 % — LOW (ref 13–44)
MAGNESIUM SERPL-MCNC: 1.8 MG/DL — SIGNIFICANT CHANGE UP (ref 1.6–2.6)
MCHC RBC-ENTMCNC: 31.2 PG — SIGNIFICANT CHANGE UP (ref 27–34)
MCHC RBC-ENTMCNC: 31.7 G/DL — LOW (ref 32–36)
MCV RBC AUTO: 98.2 FL — SIGNIFICANT CHANGE UP (ref 80–100)
METHOD TYPE: SIGNIFICANT CHANGE UP
METHOD TYPE: SIGNIFICANT CHANGE UP
MONOCYTES NFR BLD AUTO: 5.9 % — SIGNIFICANT CHANGE UP (ref 2–14)
NEUTROPHILS NFR BLD AUTO: 79.8 % — HIGH (ref 43–77)
ORGANISM # SPEC MICROSCOPIC CNT: SIGNIFICANT CHANGE UP
PHOSPHATE SERPL-MCNC: 2.4 MG/DL — LOW (ref 2.5–4.5)
PLATELET # BLD AUTO: 155 K/UL — SIGNIFICANT CHANGE UP (ref 150–400)
POTASSIUM SERPL-MCNC: 3.8 MMOL/L — SIGNIFICANT CHANGE UP (ref 3.5–5.3)
POTASSIUM SERPL-SCNC: 3.8 MMOL/L — SIGNIFICANT CHANGE UP (ref 3.5–5.3)
PROTHROM AB SERPL-ACNC: 34.3 SEC — HIGH (ref 9.8–12.7)
RBC # BLD: 3.37 M/UL — LOW (ref 4.2–5.8)
RBC # FLD: 14 % — SIGNIFICANT CHANGE UP (ref 10.3–16.9)
SODIUM SERPL-SCNC: 138 MMOL/L — SIGNIFICANT CHANGE UP (ref 135–145)
SPECIMEN SOURCE: SIGNIFICANT CHANGE UP
SPECIMEN SOURCE: SIGNIFICANT CHANGE UP
WBC # BLD: 9.3 K/UL — SIGNIFICANT CHANGE UP (ref 3.8–10.5)
WBC # FLD AUTO: 9.3 K/UL — SIGNIFICANT CHANGE UP (ref 3.8–10.5)

## 2018-08-30 PROCEDURE — 99233 SBSQ HOSP IP/OBS HIGH 50: CPT

## 2018-08-30 PROCEDURE — 99222 1ST HOSP IP/OBS MODERATE 55: CPT

## 2018-08-30 PROCEDURE — 99497 ADVNCD CARE PLAN 30 MIN: CPT | Mod: 25

## 2018-08-30 RX ORDER — VANCOMYCIN HCL 1 G
1500 VIAL (EA) INTRAVENOUS ONCE
Qty: 0 | Refills: 0 | Status: COMPLETED | OUTPATIENT
Start: 2018-08-30 | End: 2018-08-30

## 2018-08-30 RX ORDER — CARBIDOPA AND LEVODOPA 25; 100 MG/1; MG/1
1 TABLET ORAL THREE TIMES A DAY
Qty: 0 | Refills: 0 | Status: DISCONTINUED | OUTPATIENT
Start: 2018-08-30 | End: 2018-09-07

## 2018-08-30 RX ORDER — TAMSULOSIN HYDROCHLORIDE 0.4 MG/1
0.8 CAPSULE ORAL AT BEDTIME
Qty: 0 | Refills: 0 | Status: DISCONTINUED | OUTPATIENT
Start: 2018-08-30 | End: 2018-09-07

## 2018-08-30 RX ORDER — METOPROLOL TARTRATE 50 MG
12.5 TABLET ORAL ONCE
Qty: 0 | Refills: 0 | Status: COMPLETED | OUTPATIENT
Start: 2018-08-30 | End: 2018-08-30

## 2018-08-30 RX ORDER — LINEZOLID 600 MG/300ML
600 INJECTION, SOLUTION INTRAVENOUS EVERY 12 HOURS
Qty: 0 | Refills: 0 | Status: DISCONTINUED | OUTPATIENT
Start: 2018-08-30 | End: 2018-08-30

## 2018-08-30 RX ORDER — FINASTERIDE 5 MG/1
5 TABLET, FILM COATED ORAL DAILY
Qty: 0 | Refills: 0 | Status: DISCONTINUED | OUTPATIENT
Start: 2018-08-30 | End: 2018-09-07

## 2018-08-30 RX ORDER — METOPROLOL TARTRATE 50 MG
12.5 TABLET ORAL EVERY 12 HOURS
Qty: 0 | Refills: 0 | Status: DISCONTINUED | OUTPATIENT
Start: 2018-08-30 | End: 2018-08-30

## 2018-08-30 RX ORDER — METOPROLOL TARTRATE 50 MG
25 TABLET ORAL
Qty: 0 | Refills: 0 | Status: DISCONTINUED | OUTPATIENT
Start: 2018-08-31 | End: 2018-08-31

## 2018-08-30 RX ORDER — QUETIAPINE FUMARATE 200 MG/1
12.5 TABLET, FILM COATED ORAL AT BEDTIME
Qty: 0 | Refills: 0 | Status: COMPLETED | OUTPATIENT
Start: 2018-08-30 | End: 2018-08-30

## 2018-08-30 RX ADMIN — PIPERACILLIN AND TAZOBACTAM 200 GRAM(S): 4; .5 INJECTION, POWDER, LYOPHILIZED, FOR SOLUTION INTRAVENOUS at 12:39

## 2018-08-30 RX ADMIN — Medication 12.5 MILLIGRAM(S): at 21:26

## 2018-08-30 RX ADMIN — CARBIDOPA AND LEVODOPA 1 TABLET(S): 25; 100 TABLET ORAL at 05:36

## 2018-08-30 RX ADMIN — Medication 300 MILLIGRAM(S): at 21:26

## 2018-08-30 RX ADMIN — PIPERACILLIN AND TAZOBACTAM 200 GRAM(S): 4; .5 INJECTION, POWDER, LYOPHILIZED, FOR SOLUTION INTRAVENOUS at 05:59

## 2018-08-30 RX ADMIN — FINASTERIDE 5 MILLIGRAM(S): 5 TABLET, FILM COATED ORAL at 12:38

## 2018-08-30 RX ADMIN — PIPERACILLIN AND TAZOBACTAM 200 GRAM(S): 4; .5 INJECTION, POWDER, LYOPHILIZED, FOR SOLUTION INTRAVENOUS at 00:18

## 2018-08-30 RX ADMIN — Medication 1 APPLICATION(S): at 12:38

## 2018-08-30 RX ADMIN — CARBIDOPA AND LEVODOPA 1 TABLET(S): 25; 100 TABLET ORAL at 14:43

## 2018-08-30 RX ADMIN — Medication 105 MILLIGRAM(S): at 05:37

## 2018-08-30 RX ADMIN — Medication 5 MILLILITER(S): at 14:46

## 2018-08-30 RX ADMIN — QUETIAPINE FUMARATE 12.5 MILLIGRAM(S): 200 TABLET, FILM COATED ORAL at 21:27

## 2018-08-30 RX ADMIN — Medication 12.5 MILLIGRAM(S): at 17:33

## 2018-08-30 RX ADMIN — Medication 1 MILLIGRAM(S): at 14:42

## 2018-08-30 RX ADMIN — Medication 1 APPLICATION(S): at 12:39

## 2018-08-30 RX ADMIN — Medication 12.5 MILLIGRAM(S): at 05:36

## 2018-08-30 RX ADMIN — PIPERACILLIN AND TAZOBACTAM 200 GRAM(S): 4; .5 INJECTION, POWDER, LYOPHILIZED, FOR SOLUTION INTRAVENOUS at 17:34

## 2018-08-30 RX ADMIN — TAMSULOSIN HYDROCHLORIDE 0.8 MILLIGRAM(S): 0.4 CAPSULE ORAL at 21:26

## 2018-08-30 RX ADMIN — CARBIDOPA AND LEVODOPA 1 TABLET(S): 25; 100 TABLET ORAL at 21:26

## 2018-08-30 NOTE — BEHAVIORAL HEALTH ASSESSMENT NOTE - PERCEPTIONS
January 3, 2022    Patient: Balwinder Schrader   YOB: 1966     Dear Employer,  At [place] [id number], we are taking special precautions and doing everything we can to prevent the spread of COVID-19 (coronavirus disease 2019).  During
No abnormalities

## 2018-08-30 NOTE — BEHAVIORAL HEALTH ASSESSMENT NOTE - NSBHADMITCOUNSEL_PSY_A_CORE
client/family/caregiver education/importance of adherence to chosen treatment/risk factor reduction/prognosis/instructions for management, treatment and follow up/diagnostic results/impressions and/or recommended studies/risks and benefits of treatment options

## 2018-08-30 NOTE — PROGRESS NOTE ADULT - ASSESSMENT
89 y/o M with hx of Parkinson disease, HTN, BPH, ETOH abuse, afib on Coumadin, found down at home for unknown period of time (up to 2 days) admitted for toxic metabolic encephalopathy and acute renal failure and found to have UTI s/p 7 day tx w/ ceftriaxone now with NORMA PNA.

## 2018-08-30 NOTE — PROGRESS NOTE ADULT - PROBLEM SELECTOR PLAN 2
Remains confused with increased delirium   treated with vitamins and fluids thus far  will need tx for delirium

## 2018-08-30 NOTE — PROGRESS NOTE ADULT - PROBLEM SELECTOR PLAN 5
In the setting of dehydration and obstruction from BPH, underlying CKD 3  - Cr today 1.47  - cont IVF and trend BMP's In the setting of dehydration and obstruction from BPH, underlying CKD 3  - Cr today 1.33 (improving)  - cont IVF and trend BMP's In the setting of dehydration and obstruction from BPH, underlying CKD 3  - Cr today 1.33 (improving)  - cont IVF and trend BMP's    #diarrhea- pt w diarrhea. rectal tube in place. cdiff ruled out  - f/u GI PCR  - continue to monitor  - replete fluids

## 2018-08-30 NOTE — PROGRESS NOTE ADULT - PROBLEM SELECTOR PROBLEM 5
Aspiration pneumonia of right lower lobe, unspecified aspiration pneumonia type Pneumonia, bacterial

## 2018-08-30 NOTE — PROGRESS NOTE ADULT - PROBLEM SELECTOR PLAN 5
caused by gram neg bacteria  sputum culture positive for MRSA.   c/w zosyn per medicine   WBC WNL this AM

## 2018-08-30 NOTE — BEHAVIORAL HEALTH ASSESSMENT NOTE - NSBHCHARTREVIEWVS_PSY_A_CORE FT
Vital Signs Last 24 Hrs  T(C): 36.5 (30 Aug 2018 13:53), Max: 36.7 (30 Aug 2018 06:00)  T(F): 97.7 (30 Aug 2018 13:53), Max: 98 (30 Aug 2018 06:00)  HR: 96 (30 Aug 2018 12:48) (80 - 102)  BP: 132/70 (30 Aug 2018 12:48) (110/84 - 139/83)  BP(mean): 89 (30 Aug 2018 12:48) (89 - 108)  RR: 20 (30 Aug 2018 05:35) (18 - 20)  SpO2: 100% (30 Aug 2018 12:48) (97% - 100%)

## 2018-08-30 NOTE — BEHAVIORAL HEALTH ASSESSMENT NOTE - NSBHCHARTREVIEWIMAGING_PSY_A_CORE FT
8/21 HCT  IMPRESSION:  1. No CT evidence of acute intracranial hemorrhage.  2. 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  3. No significant change compared to the prior study of 8/3/2018.

## 2018-08-30 NOTE — PROGRESS NOTE ADULT - PROBLEM SELECTOR PLAN 2
Pt has   - f/u sputum cx  - f/u blood cx  - continue to trend CBC  - c/w zosyn    # Hypoxic hypercapnic respiratory failure likely 2/2 PNA  -Pt found to have PNA w/ increased respiratory needs. ABG showed met acidosis with resp alkalosis  - saturation stable on high flow NC  - will cont with oxygen supplementation and pulmonary toilet Pt has sputum culture positive for MRSA.  - patient started on linezolid; monitor for signs of serotonin syndrome  - c/w zosyn  - f/u blood cx  - continue to trend CBC    # Hypoxic hypercapnic respiratory failure likely 2/2 PNA  -Pt found to have PNA w/ increased respiratory needs. ABG showed met acidosis with resp alkalosis  - pt deescalated from HFNC to NC 6L today  - will cont with oxygen supplementation and pulmonary toilet

## 2018-08-30 NOTE — PROGRESS NOTE ADULT - PROBLEM SELECTOR PLAN 7
Support provided to patient and family. Patient to have access to supportive services during rest of hospital stay as the patient/family deemed necessary ie. Chaplaincy, Massage therapy, Music therapy, Patient and family supportive services, Palliative SW, etc.  As discussed during the palliative IDT meeting and as identified during the patients PSSA screening the patient would benefit from pt and  family support for for wife who is very overwhelmed with family obligations and guilt over feelings surrounding her marriage and pt illness

## 2018-08-30 NOTE — PROGRESS NOTE ADULT - PROBLEM SELECTOR PLAN 4
Pt has h/o of a-fib on home Coumadin and Toprol XL 200mg. Was difficult to rate control earlier this admission likely 2/2 missed doses of Toprol XL  - on lopressor for rate control. Pt has been tachycardic 90s-120s  - pt unable to tolerate PO meds at this time, has NGT. Will continue lopressor via NGT and f/u INRs for need for coumadin vs. lovenox for anticoagulation Pt has h/o of a-fib on home Coumadin and Toprol XL 200mg. Was difficult to rate control earlier this admission likely 2/2 missed doses of Toprol XL  - on lopressor for rate control. Pt has been tachycardic 90s-120s  - Continue lopressor  - f/u INRs for need for coumadin vs. lovenox for anticoagulation

## 2018-08-30 NOTE — CHART NOTE - NSCHARTNOTEFT_GEN_A_CORE
Infectious Diseases Anti-infective Approval Note    Medication:  Linezolid  Dose:  600 mg  Route:  po  Frequency:  q12h  Duration:  5 d    Dose may be adjusted as needed for alterations in renal function.    *THIS IS NOT AN INFECTIOUS DISEASES CONSULTATION*

## 2018-08-30 NOTE — PROGRESS NOTE ADULT - ASSESSMENT
89 yo with Parkinsons found face down at home following a fall 24-48 hours previously.  Dehydrated hypotensive, renal failure.  Perhaps some role of alcohol although screen was negative.  Renal failure had improved but patient reemained confused.  Now with likely aspiration and sepsis transferred to ICU doing better 89 yo with Parkinsons found face down at home following a fall 24-48 hours previously.  Dehydrated hypotensive, renal failure.  Perhaps some role of alcohol although screen was negative.  Renal failure had improved but patient reemained confused.  Now with likely aspiration and sepsis transferred to and medically is  doing better

## 2018-08-30 NOTE — BEHAVIORAL HEALTH ASSESSMENT NOTE - SUMMARY
89M h/o EtOH use disorder, Parkinson's, presenting with AMS in setting of fall at home, acute renal failure, UTI, and now PNA.  Likely EtOH use prior to admission (BAL 0 on this admission but 165 on 8/3) and patient predisposed to delirium given PD.  Would trial Seroquel as below for delirium/agitation as lower risk of EPS than many other antipsychotics.

## 2018-08-30 NOTE — PROGRESS NOTE ADULT - PROBLEM SELECTOR PLAN 8
Advance care planning meeting  Start time: 2:00 P  End time: 2:25P  Total time: 25 min    A face to face meeting to discuss advance care planning was held today regarding: SADIA LUTZ  Primary decision maker: Angle Lutz    Discussed advance directives including, but not limited to, healthcare proxy and code status.  Decision regarding code status: DNR/DNI  Documentation completed today: MELINDA    Met with pt wife Angle (at her request) to discuss EOL wishes. She states that she had been in conversation with Dr Magana earlier in the week and has now decided to initiate a DNR/DNI order as she does not wish to place undue burden upon her  at the EOL. She states that she "does not want any aggressive means" She is hoping that this NP might have conversation with pt to discuss what exactly his wishes might be.  She does not feel comfortable further delineating goals at this time without attempt to have pt input into discussion Pt was too delirious today, will attempt conversation again over hospitalization should delirium resolve.

## 2018-08-30 NOTE — PROGRESS NOTE ADULT - PROBLEM SELECTOR PLAN 1
8/27 pt found to have rigors and was ill appearing with tachycardia to 143, temp to 101.4, RR of 30 and oxygen saturation of 80% on RA. CXR showed new NORMA opacity consistent with aspiration PNA. Lactate 2.7 (trended down to 1.8), and leukocytosis to 11.3 with left shift. Pt was fluid resuscitated with 1L bolus NS and fluids continued. Pt was started on unasyn. Switched to zosyn today AM  - lactate improved  - leukocytosis to 14.3 today, continue to trend  - c/w zosyn  - f/u blood cx  - pt also received 7 day course Ceftriaxone 1g q24h last dose on 27th for sepsis 2/2 UTI 8/27 pt found to have rigors and was ill appearing with tachycardia to 143, temp to 101.4, RR of 30 and oxygen saturation of 80% on RA. CXR showed new NORMA opacity consistent with aspiration PNA. Lactate 2.7 (trended down to 1.8), and leukocytosis to 11.3 with left shift. Pt was fluid resuscitated with 1L bolus NS and fluids continued. On zosyn for empiric coverage  - lactate improved  - leukocytosis improved to 9.3 today, continue to trend  - c/w zosyn  - f/u blood cx  - pt also received 7 day course Ceftriaxone 1g q24h last dose on 27th for sepsis 2/2 UTI

## 2018-08-30 NOTE — PROGRESS NOTE ADULT - SUBJECTIVE AND OBJECTIVE BOX
INTERVAL HPI/OVERNIGHT EVENTS:  Patient was seen and examined at bedside. As per nurse and patient, no o/n events, patient resting comfortably. No complaints at this time. Patient denies: fever, chills, dizziness, weakness, HA, Changes in vision, CP, palpitations, SOB, cough, N/V/D/C, dysuria, changes in bowel movements, LE edema. ROS otherwise negative.    VITAL SIGNS:  T(F): 98 (18 @ 06:00)  HR: 100 (18 @ 05:35)  BP: 130/71 (18 @ 05:35)  RR: 20 (18 @ 05:35)  SpO2: 100% (18 @ 05:35)  Wt(kg): --    PHYSICAL EXAM:    Constitutional: WDWN, NAD  HEENT: PERRL, EOMI, sclera non-icteric, neck supple, trachea midline, no masses, no JVD, MMM, good dentition  Respiratory: CTA b/l, good air entry b/l, no wheezing, no rhonchi, no rales, without accessory muscle use and no intercostal retractions  Cardiovascular: RRR, normal S1S2, no M/R/G  Gastrointestinal: soft, NTND, no masses palpable, BS normal  Extremities: Warm, well perfused, pulses equal bilateral upper and lower extremities, no edema, no clubbing. Capillary refill <2 sec  Neurological: AAOx3, CN Grossly intact  Skin: Normal temperature, warm, dry    MEDICATIONS  (STANDING):  BACItracin   Ointment 1 Application(s) Topical daily  Biotene Dry Mouth Oral Rinse 5 milliLiter(s) Swish and Spit daily  carbidopa/levodopa  25/100 1 Tablet(s) Oral three times a day  erythromycin   Ointment 1 Application(s) Right EYE daily  folic acid Injectable 1 milliGRAM(s) IV Push daily  lactated ringers. 1000 milliLiter(s) (80 mL/Hr) IV Continuous <Continuous>  metoprolol tartrate 12.5 milliGRAM(s) Oral every 12 hours  piperacillin/tazobactam IVPB. 3.375 Gram(s) IV Intermittent every 6 hours  thiamine IVPB 500 milliGRAM(s) IV Intermittent three times a day    MEDICATIONS  (PRN):  acetaminophen  Suppository 650 milliGRAM(s) Rectal every 6 hours PRN For Temp greater than 38 C (100.4 F)      Allergies    aspirin (Unknown)    Intolerances        LABS:                        11.1   14.7  )-----------( 152      ( 29 Aug 2018 05:14 )             34.6         140  |  106  |  27<H>  ----------------------------<  97  4.4   |  17<L>  |  1.47<H>    Ca    8.4      29 Aug 2018 05:13  Phos  2.6       Mg     2.2         TPro  5.8<L>  /  Alb  2.7<L>  /  TBili  0.8  /  DBili  x   /  AST  24  /  ALT  <5<L>  /  AlkPhos  54      PT/INR - ( 29 Aug 2018 05:15 )   PT: 33.7 sec;   INR: 2.97          PTT - ( 29 Aug 2018 05:15 )  PTT:20.8 sec  Urinalysis Basic - ( 28 Aug 2018 13:54 )    Color: Yellow / Appearance: Cloudy / S.025 / pH: x  Gluc: x / Ketone: 15 mg/dL  / Bili: Negative / Urobili: 0.2 E.U./dL   Blood: x / Protein: 100 mg/dL / Nitrite: NEGATIVE   Leuk Esterase: Small / RBC: Many /HPF / WBC 5-10 /HPF   Sq Epi: x / Non Sq Epi: Moderate /HPF / Bacteria: Present /HPF        RADIOLOGY & ADDITIONAL TESTS:  Reviewed INTERVAL HPI/OVERNIGHT EVENTS:  Patient was seen and examined at bedside. Overnight, pt was noted to be agitated and pulled out his NG tube. Today AM, patient was less agitated. Pt with severe diarrhea; Cdiff was ruled out. As per speech and swallow, pt ok for mechanical soft diet with nectar thick liquids. Pt denies CP, SOB, Abd pain, N/V.     VITAL SIGNS:  T(F): 98 (18 @ 06:00)  HR: 100 (18 @ 05:35)  BP: 130/71 (18 @ 05:35)  RR: 20 (18 @ 05:35)  SpO2: 100% (18 @ 05:35)  Wt(kg): --    PHYSICAL EXAM:    Constitutional: elderly male laying in bed in NAD   Respiratory: Good inspiratory effort, + rhonchi, no wheezing or rales, without accessory muscle use and no intercostal retractions  Cardiovascular: Tachycardic, normal S1S2, no M/R/G  Gastrointestinal: soft, NTND, no masses palpable, BS normal  : Good in situ draining yellow urine, rectal tube in place draining dark loose stool   Extremities: Warm, well perfused, pulses equal bilateral upper and lower extremities, no edema; L upper arm with deformity 2/2 trauma  Skin: multiple healing contusions on face and chin; chin entirely healed, cheeks with scabs; well-healing scab on L knee    MEDICATIONS  (STANDING):  BACItracin   Ointment 1 Application(s) Topical daily  Biotene Dry Mouth Oral Rinse 5 milliLiter(s) Swish and Spit daily  carbidopa/levodopa  25/100 1 Tablet(s) Oral three times a day  erythromycin   Ointment 1 Application(s) Right EYE daily  folic acid Injectable 1 milliGRAM(s) IV Push daily  lactated ringers. 1000 milliLiter(s) (80 mL/Hr) IV Continuous <Continuous>  metoprolol tartrate 12.5 milliGRAM(s) Oral every 12 hours  piperacillin/tazobactam IVPB. 3.375 Gram(s) IV Intermittent every 6 hours  thiamine IVPB 500 milliGRAM(s) IV Intermittent three times a day    MEDICATIONS  (PRN):  acetaminophen  Suppository 650 milliGRAM(s) Rectal every 6 hours PRN For Temp greater than 38 C (100.4 F)      Allergies    aspirin (Unknown)    Intolerances        LABS:                        11.1   14.7  )-----------( 152      ( 29 Aug 2018 05:14 )             34.6         140  |  106  |  27<H>  ----------------------------<  97  4.4   |  17<L>  |  1.47<H>    Ca    8.4      29 Aug 2018 05:13  Phos  2.6       Mg     2.2         TPro  5.8<L>  /  Alb  2.7<L>  /  TBili  0.8  /  DBili  x   /  AST  24  /  ALT  <5<L>  /  AlkPhos  54      PT/INR - ( 29 Aug 2018 05:15 )   PT: 33.7 sec;   INR: 2.97          PTT - ( 29 Aug 2018 05:15 )  PTT:20.8 sec  Urinalysis Basic - ( 28 Aug 2018 13:54 )    Color: Yellow / Appearance: Cloudy / S.025 / pH: x  Gluc: x / Ketone: 15 mg/dL  / Bili: Negative / Urobili: 0.2 E.U./dL   Blood: x / Protein: 100 mg/dL / Nitrite: NEGATIVE   Leuk Esterase: Small / RBC: Many /HPF / WBC 5-10 /HPF   Sq Epi: x / Non Sq Epi: Moderate /HPF / Bacteria: Present /HPF        RADIOLOGY & ADDITIONAL TESTS:  Reviewed

## 2018-08-30 NOTE — PROGRESS NOTE ADULT - PROBLEM SELECTOR PLAN 3
- pt originally presented with UTI in AMS  - currently A&Ox1 to person only  - AMS likely 2/2 sepsis, will cont to monitor and treat underlying disease - pt originally presented with UTI in AMS  - currently A&Ox1 to person only  - AMS likely 2/2 sepsis vs. delirium, will cont to monitor and treat underlying disease    #delirium- pt continues to be oriented only to self with agitation overnight. Likely 2/2 delirium in the setting of sepsis/hospitalization and possible underlying dementia from Parkinson's  - f/u psychiatry recs  - try to orient patient  - continue to monitor

## 2018-08-30 NOTE — PROGRESS NOTE ADULT - PROBLEM SELECTOR PLAN 8
F: LR @ 80cc/hr  E: replete K<4, Mg<2  N: for NG tube feeds start jevity 1.2 at 80    VTE Prophylaxis: Coumadin  C: Full Code  D: RMF    #Speech   - per speech FEES showed aspiration with thin liquid, residuals with nectar thick liquids   - d/t to pt's worsening of respiratory status will wait 24hrs before restarting oral diet  - will start mechanical soft nectar thick oral diet in 24hrs F: LR @ 80cc/hr  E: replete K<4, Mg<2  N: Mechanical soft with nectar thin liquids; needs supervision, elevate bed 90 degrees, monitor for signs of aspiration.    VTE Prophylaxis: Coumadin  C: DNR/DNI  D: RMF

## 2018-08-30 NOTE — PROVIDER CONTACT NOTE (OTHER) - SITUATION
MD made aware to modify orders for metoprolol to oral via NGtube and carbidopa to oral via NG tube.   Also made aware to renew Latisha.

## 2018-08-30 NOTE — PROGRESS NOTE ADULT - SUBJECTIVE AND OBJECTIVE BOX
Patient is a 89y old  Male who presents with a chief complaint of Toxic Metabolic Encephalopathy, Acute Renal Failure, (21 Aug 2018 16:56)        INTERVAL HPI/OVERNIGHT EVENTS: none    SYMPTOMS confused, mild agitation    DRIPS anurag        ICU Vital Signs Last 24 Hrs  T(C): 36.6 (30 Aug 2018 09:22), Max: 37.3 (29 Aug 2018 14:24)  T(F): 97.8 (30 Aug 2018 09:22), Max: 99.1 (29 Aug 2018 14:24)  HR: 100 (30 Aug 2018 09:01) (80 - 102)  BP: 119/67 (30 Aug 2018 08:45) (85/53 - 139/83)  BP(mean): 89 (30 Aug 2018 08:45) (69 - 108)  ABP: --  ABP(mean): --  RR: 20 (30 Aug 2018 05:35) (0 - 20)  SpO2: 100% (30 Aug 2018 09:01) (97% - 100%)      I&O's Summary    29 Aug 2018 07:  -  30 Aug 2018 07:00  --------------------------------------------------------  IN: 2220 mL / OUT: 550 mL / NET: 1670 mL    30 Aug 2018 07:01  -  30 Aug 2018 11:20  --------------------------------------------------------  IN: 0 mL / OUT: 400 mL / NET: -400 mL        EXAM    Chest ronchi    Heart rr    Abdomen soft nontender with bs    Extremities no edema    Neuro confused, answers some question, mild agitation      LABS:    ABG - ( 28 Aug 2018 14:23 )  pH: 7.40  /  pCO2: 28    /  pO2: 274   / HCO3: 17    / Base Excess: -6.5  /  SaO2: 100                                     10.5   9.3   )-----------( 155      ( 30 Aug 2018 10:51 )             33.1     08-30    138  |  108  |  20  ----------------------------<  97  3.8   |  15<L>  |  1.33<H>    Ca    8.1<L>      30 Aug 2018 06:46  Phos  2.4     -  Mg     1.8     -30    TPro  5.8<L>  /  Alb  2.7<L>  /  TBili  0.8  /  DBili  x   /  AST  24  /  ALT  <5<L>  /  AlkPhos  54  08-    PT/INR - ( 30 Aug 2018 06:47 )   PT: 34.3 sec;   INR: 3.02          PTT - ( 30 Aug 2018 06:47 )  PTT:31.9 sec  Urinalysis Basic - ( 28 Aug 2018 13:54 )    Color: Yellow / Appearance: Cloudy / S.025 / pH: x  Gluc: x / Ketone: 15 mg/dL  / Bili: Negative / Urobili: 0.2 E.U./dL   Blood: x / Protein: 100 mg/dL / Nitrite: NEGATIVE   Leuk Esterase: Small / RBC: Many /HPF / WBC 5-10 /HPF   Sq Epi: x / Non Sq Epi: Moderate /HPF / Bacteria: Present /HPF      sputum -staph (MSSA)      RADIOLOGY & ADDITIONAL STUDIES:    CRITICAL CARE TIME SPENT:

## 2018-08-30 NOTE — PROGRESS NOTE ADULT - PROBLEM SELECTOR PLAN 6
Discussed at length with patients wife-  she is greatly overwhelmed by family circumstance.  Explanation of medical situation.  Support provided  Now in ICU with multiple problems, even though on paper he seemed to be improving.Prognosis much more guarded at this time.  Wife fully aware.  She would like all treatments at this time, but says other decisions may be necessary if there is worsening.  Urge ICU team to continue conversations    Support provided to patient and family. Patient to have access to supportive services during rest of hospital stay as the patient/family deemed necessary ie. Chaplaincy, Massage therapy, Music therapy, Patient and family supportive services, Palliative SW, etc.  Pall  continues to work with wife  As discussed during the palliative IDT meeting and as identified during the patients PSSA screening the patient would benefit from: support for wife.  Will need ALBIN placement with expectation to return home seen by speech and swallow  cleared for mechanical soft diet with nectar thick liquids

## 2018-08-30 NOTE — PROGRESS NOTE ADULT - SUBJECTIVE AND OBJECTIVE BOX
SADIA LUTZ   [id number]     ([id number]):     HPI:  89M with Parkinson's (on Sinemet), Afib (Toprol XL and Coumadin), HTN, BPH and ETOH abuse who was BIBEMS after being found down at home. Wife was out of town caring for another sick family member.  He was alone and probably fell 24-48 hour earlier.  On admission was in renal failure and found to have a UTI.  Toxic screen was negative for drugs or alcohol.    Subjective:  Pt is very agitated /delerious today. Unhappy that he is in wrist restraints to prevent pulling out IV's/chan  needed to provide for his care. Difficult to engage in conversation with pt, wife at bedside and wishes to discuss  MOLST is awaiting psych eval for medication recs    ROS:    Unable to obtain due to: delirium                    Dyspnea (Carissa 0-10):   N              N/V (Y/N):                N              Secretions (Y/N) :    N            Agitation(Y/N):        N  Pain (Y/N):     pt denies    Allergies    aspirin (Unknown)    Intolerances    Opiate Naive (Y/N): yes, med list obtained from patient's pharmacy    Medications:      MEDICATIONS  (STANDING):  BACItracin   Ointment 1 Application(s) Topical daily  Biotene Dry Mouth Oral Rinse 5 milliLiter(s) Swish and Spit daily  carbidopa/levodopa  25/100 1 Tablet(s) Oral three times a day  erythromycin   Ointment 1 Application(s) Right EYE daily  finasteride 5 milliGRAM(s) Oral daily  folic acid Injectable 1 milliGRAM(s) IV Push daily  lactated ringers. 1000 milliLiter(s) (80 mL/Hr) IV Continuous <Continuous>  metoprolol tartrate 12.5 milliGRAM(s) Oral every 12 hours  piperacillin/tazobactam IVPB. 3.375 Gram(s) IV Intermittent every 6 hours  tamsulosin 0.8 milliGRAM(s) Oral at bedtime  vancomycin  IVPB 1500 milliGRAM(s) IV Intermittent once    MEDICATIONS  (PRN):  acetaminophen  Suppository 650 milliGRAM(s) Rectal every 6 hours PRN For Temp greater than 38 C (100.4 F)      Labs:                                           10.5   9.3   )-----------( 155      ( 30 Aug 2018 10:51 )             33.1       138  |  108  |  20  ----------------------------<  97  3.8   |  15<L>  |  1.33<H>    Ca    8.1<L>      30 Aug 2018 06:46  Phos  2.4       Mg     1.8         Imaging  EXAM:  XR CHEST PORTABLE URGENT 1V                          PROCEDURE DATE:  2018        INTERPRETATION:    INDICATION: NG tube    TECHNIQUE: Chest, single portable AP view on 2018 12:06 PM     COMPARISON: 2018 earlier today    FINDINGS:   The enteric feeding tube projects over the left upper quadrant,. There is   elevation of left hemidiaphragm    There are multifocal airspace opacities in the left perihilar region and   left lung base. Hazy patchy opacity at the medial right lung base. No   evidence of pneumothorax. Visualized cardiomediastinal silhouette is   stable in appearance.      IMPRESSION:   Enteric feeding tube is presumed to be within the stomach.    Multifocal opacities in in the left mid and lower lung zones, with   elevated left diaphragm.                           ECG   Ventricular Rate 105 BPM    Atrial Rate 91 BPM    QRS Duration 80 ms    Q-T Interval 348 ms    QTC Calculation(Bezet) 459 ms    R Axis -7 degrees    T Axis 123 degrees    Diagnosis Line Atrial fibrillation with rapid ventricular response  ST & T wave abnormality, consider lateral ischemia or digitalis effect  Abnormal ECG    PEx:  Vital Signs Last 24 Hrs  ICU Vital Signs Last 24 Hrs  T(C): 36.8 (30 Aug 2018 17:48), Max: 36.8 (30 Aug 2018 17:48)  T(F): 98.2 (30 Aug 2018 17:48), Max: 98.2 (30 Aug 2018 17:48)  HR: 108 (30 Aug 2018 17:42) (80 - 113)  BP: 151/81 (30 Aug 2018 17:42) (110/84 - 151/81)  BP(mean): 110 (30 Aug 2018 17:42) (89 - 110)  ABP: --  ABP(mean): --  RR: 20 (30 Aug 2018 05:35) (18 - 20)  SpO2: 98% (30 Aug 2018 17:42) (97% - 100%)    General: elderly delirious gentleman in bed   HEENT:  non-icteric sclera, multiple healing  bruises abrasion on chin and both cheeks, dry mucous membranes on high flow nasal canulla  Neck: supple no JVD  CVS :irregularly irregulary no murmur heard tachycardia  Resp: poor resp effort  GI:  t, normal BS, non-tender  :  voiding freely  Musc:   equal strength, no significant wasting, no joint erythema or effusion  Neuro: non focal,  Psych:   confused, delirious  Skin: diffuse actinic keratoses and evidence of sun damage, multiple abrasions including both knees  Lymph:no adenopathy  Preadmit Karnofsky: presumed 60-70%           Current Karnofsky:     30%  Cachexia (Y/N): no  BMI: 25.4    Advanced Directives:     DNR/DNI  MOLST       Decision maker: Patient presently not able to make complex medical decisions  Legal surrogate: wife Angle Lutz    SGOALS OF CARE DISCUSSION             Documentation of GOC: to recover from this event, to go to rehab.  Wife presently overwhelmed regarding family illness  Hoping for improvement in strength and mental status.         REFERRALS	        Palliative Med               PT/OT SADIA LUTZ   [id number]     ([id number]):     HPI:  89M with Parkinson's (on Sinemet), Afib (Toprol XL and Coumadin), HTN, BPH and ETOH abuse who was BIBEMS after being found down at home. Wife was out of town caring for another sick family member.  He was alone and probably fell 24-48 hour earlier.  On admission was in renal failure and found to have a UTI.  Toxic screen was negative for drugs or alcohol.    Subjective:  Pt is very agitated /delerious today. Unhappy that he is in wrist restraints to prevent pulling out IV's/chan  needed to provide for his care.  Pt pulled out NGT overnight Difficult to engage in conversation with pt, wife at bedside and wishes to discuss Presently is  awaiting psych eval for medication recs    ROS:    Unable to obtain due to: delirium                    Dyspnea (Carissa 0-10):   N              N/V (Y/N):                N              Secretions (Y/N) :    N            Agitation(Y/N):        N  Pain (Y/N):     pt denies    Allergies    aspirin (Unknown)    Intolerances    Opiate Naive (Y/N): yes, med list obtained from patient's pharmacy    Medications:      MEDICATIONS  (STANDING):  BACItracin   Ointment 1 Application(s) Topical daily  Biotene Dry Mouth Oral Rinse 5 milliLiter(s) Swish and Spit daily  carbidopa/levodopa  25/100 1 Tablet(s) Oral three times a day  erythromycin   Ointment 1 Application(s) Right EYE daily  finasteride 5 milliGRAM(s) Oral daily  folic acid Injectable 1 milliGRAM(s) IV Push daily  lactated ringers. 1000 milliLiter(s) (80 mL/Hr) IV Continuous <Continuous>  metoprolol tartrate 12.5 milliGRAM(s) Oral every 12 hours  piperacillin/tazobactam IVPB. 3.375 Gram(s) IV Intermittent every 6 hours  tamsulosin 0.8 milliGRAM(s) Oral at bedtime  vancomycin  IVPB 1500 milliGRAM(s) IV Intermittent once    MEDICATIONS  (PRN):  acetaminophen  Suppository 650 milliGRAM(s) Rectal every 6 hours PRN For Temp greater than 38 C (100.4 F)      Labs:                                           10.5   9.3   )-----------( 155      ( 30 Aug 2018 10:51 )             33.1   08-30    138  |  108  |  20  ----------------------------<  97  3.8   |  15<L>  |  1.33<H>    Ca    8.1<L>      30 Aug 2018 06:46  Phos  2.4       Mg     1.8         Imaging  EXAM:  XR CHEST PORTABLE URGENT 1V                          PROCEDURE DATE:  2018        INTERPRETATION:    INDICATION: NG tube    TECHNIQUE: Chest, single portable AP view on 2018 12:06 PM     COMPARISON: 2018 earlier today    FINDINGS:   The enteric feeding tube projects over the left upper quadrant,. There is   elevation of left hemidiaphragm    There are multifocal airspace opacities in the left perihilar region and   left lung base. Hazy patchy opacity at the medial right lung base. No   evidence of pneumothorax. Visualized cardiomediastinal silhouette is   stable in appearance.      IMPRESSION:   Enteric feeding tube is presumed to be within the stomach.    Multifocal opacities in in the left mid and lower lung zones, with   elevated left diaphragm.                           ECG   Ventricular Rate 105 BPM    Atrial Rate 91 BPM    QRS Duration 80 ms    Q-T Interval 348 ms    QTC Calculation(Bezet) 459 ms    R Axis -7 degrees    T Axis 123 degrees    Diagnosis Line Atrial fibrillation with rapid ventricular response  ST & T wave abnormality, consider lateral ischemia or digitalis effect  Abnormal ECG    PEx:  Vital Signs Last 24 Hrs  ICU Vital Signs Last 24 Hrs  T(C): 36.8 (30 Aug 2018 17:48), Max: 36.8 (30 Aug 2018 17:48)  T(F): 98.2 (30 Aug 2018 17:48), Max: 98.2 (30 Aug 2018 17:48)  HR: 108 (30 Aug 2018 17:42) (80 - 113)  BP: 151/81 (30 Aug 2018 17:42) (110/84 - 151/81)  BP(mean): 110 (30 Aug 2018 17:42) (89 - 110)  ABP: --  ABP(mean): --  RR: 20 (30 Aug 2018 05:35) (18 - 20)  SpO2: 98% (30 Aug 2018 17:42) (97% - 100%)    General: elderly delirious gentleman in bed   HEENT:  non-icteric sclera, multiple healing  bruises abrasion on chin and both cheeks, dry mucous membranes on high flow nasal canulla  Neck: supple no JVD  CVS :irregularly irregulary no murmur heard tachycardia  Resp: poor resp effort  GI:  t, normal BS, non-tender  :  voiding freely  Musc:   equal strength, no significant wasting, no joint erythema or effusion  Neuro: non focal,  Psych:   confused, delirious  Skin: diffuse actinic keratoses and evidence of sun damage, multiple abrasions including both knees  Lymph:no adenopathy  Preadmit Karnofsky: presumed 60-70%           Current Karnofsky:     30%  Cachexia (Y/N): no  BMI: 25.4    Advanced Directives:     DNR/DNI  MOLST       Decision maker: Patient presently not able to make complex medical decisions  Legal surrogate: wife Angle Lutz    SGOALS OF CARE DISCUSSION             Documentation of GOC: to recover from this event, to go to rehab.  Wife presently overwhelmed regarding family illness  Hoping for improvement in strength and mental status.         REFERRALS	        Palliative Med               PT/OT

## 2018-08-30 NOTE — BEHAVIORAL HEALTH ASSESSMENT NOTE - HPI (INCLUDE ILLNESS QUALITY, SEVERITY, DURATION, TIMING, CONTEXT, MODIFYING FACTORS, ASSOCIATED SIGNS AND SYMPTOMS)
89M PPH EtOH use disorder, PMH Parkinson disease, HTN, BPH, afib on Coumadin, found down at home for unknown period of time (up to 2 days) admitted for AMS in the setting of acute renal failure, found to have UTI s/p 7 day tx w/ ceftriaxone, now with NORMA PNA.  Psychiatry consulted as patient agitated overnight, pulling out IVs and NGT.  As per primary team patient confused throughout the day but more agitated at night.    Patient seen at bedside.  Perseverates on wrist restraints. Tells convoluted story about wife.  Disoriented to location ("we're in this building and it was expanded"), oriented to "September 18."  Admits to cough.  Otherwise no complaints.  Denies AH/VH, SI/HI.  Otherwise calm and pleasant.    Wife entered room during interview, recommendations discussed.

## 2018-08-30 NOTE — BEHAVIORAL HEALTH ASSESSMENT NOTE - NSBHCHARTREVIEWLAB_PSY_A_CORE FT
10.5   9.3   )-----------( 155      ( 30 Aug 2018 10:51 )             33.1   08-30    138  |  108  |  20  ----------------------------<  97  3.8   |  15<L>  |  1.33<H>    Ca    8.1<L>      30 Aug 2018 06:46  Phos  2.4     08-30  Mg     1.8     08-30

## 2018-08-30 NOTE — PROGRESS NOTE ADULT - ASSESSMENT
A -delerium/ toxic encephalopathy/ pneumonia -aspiration/parkisnons/demetia/ nongap acidosis/diarrhea    Suggest:  try to feed  continue abx,, check to make sure sputum is mssa  check for c dif  continue RL  psyche to see for recommendations for med for delerium for PD patient  mobilize  parkinsons meds

## 2018-08-30 NOTE — PROGRESS NOTE ADULT - PROBLEM SELECTOR PLAN 7
Pt has hx of alcohol abuse. Last drink prior to admission. Alcohol level 0 on admission. CIWA consistently 0  - c/w Folate, thiamine    # Thrombocytopenia  - likely in setting of etoh abuse  - continue to trend CBC    # Anemia   - stable  - continue to trend CBC Pt has hx of alcohol abuse. Last drink prior to admission. Alcohol level 0 on admission. CIWA consistently 0  - c/w Folate    # Thrombocytopenia  - likely in setting of etoh abuse  - continue to trend CBC    # Anemia   - stable  - continue to trend CBC

## 2018-08-30 NOTE — BEHAVIORAL HEALTH ASSESSMENT NOTE - NSBHCONSULTMEDS_PSY_A_CORE FT
1. Trial Seroquel 12.5 mg PO HS  2. Can give additional Serqouel 12.5 mg PO q8h prn for agitation  3. Avoid benzodiazepines  4. Frequent reorientation, exposure to natural light 1. Trial Seroquel 12.5 mg PO HS  2. Can give additional Serqouel 12.5 mg PO q8h prn for agitation  3. Avoid benzodiazepines  4. Frequent reorientation, exposure to natural light  5. Trial removal of restraints

## 2018-08-30 NOTE — PROGRESS NOTE ADULT - PROBLEM SELECTOR PLAN 1
acute delirium requiring restraints for safety and avoidance of pt removal of IV's, chan  consider Seroquel 12.5 mg po q 12 hr with q 8 hr prn dosing

## 2018-08-31 DIAGNOSIS — J15.9 UNSPECIFIED BACTERIAL PNEUMONIA: ICD-10-CM

## 2018-08-31 DIAGNOSIS — R41.0 DISORIENTATION, UNSPECIFIED: ICD-10-CM

## 2018-08-31 LAB
ANION GAP SERPL CALC-SCNC: 12 MMOL/L — SIGNIFICANT CHANGE UP (ref 5–17)
APTT BLD: 33.2 SEC — SIGNIFICANT CHANGE UP (ref 27.5–37.4)
BUN SERPL-MCNC: 14 MG/DL — SIGNIFICANT CHANGE UP (ref 7–23)
CALCIUM SERPL-MCNC: 8.4 MG/DL — SIGNIFICANT CHANGE UP (ref 8.4–10.5)
CHLORIDE SERPL-SCNC: 108 MMOL/L — SIGNIFICANT CHANGE UP (ref 96–108)
CO2 SERPL-SCNC: 19 MMOL/L — LOW (ref 22–31)
CREAT SERPL-MCNC: 1.12 MG/DL — SIGNIFICANT CHANGE UP (ref 0.5–1.3)
GLUCOSE SERPL-MCNC: 95 MG/DL — SIGNIFICANT CHANGE UP (ref 70–99)
HCT VFR BLD CALC: 31.1 % — LOW (ref 39–50)
HGB BLD-MCNC: 10 G/DL — LOW (ref 13–17)
INR BLD: 2.45 — HIGH (ref 0.88–1.16)
MCHC RBC-ENTMCNC: 31.3 PG — SIGNIFICANT CHANGE UP (ref 27–34)
MCHC RBC-ENTMCNC: 32.2 G/DL — SIGNIFICANT CHANGE UP (ref 32–36)
MCV RBC AUTO: 97.5 FL — SIGNIFICANT CHANGE UP (ref 80–100)
PLATELET # BLD AUTO: 160 K/UL — SIGNIFICANT CHANGE UP (ref 150–400)
POTASSIUM SERPL-MCNC: 3.6 MMOL/L — SIGNIFICANT CHANGE UP (ref 3.5–5.3)
POTASSIUM SERPL-SCNC: 3.6 MMOL/L — SIGNIFICANT CHANGE UP (ref 3.5–5.3)
PROTHROM AB SERPL-ACNC: 27.7 SEC — HIGH (ref 9.8–12.7)
RBC # BLD: 3.19 M/UL — LOW (ref 4.2–5.8)
RBC # FLD: 13.6 % — SIGNIFICANT CHANGE UP (ref 10.3–16.9)
SODIUM SERPL-SCNC: 139 MMOL/L — SIGNIFICANT CHANGE UP (ref 135–145)
VANCOMYCIN FLD-MCNC: 12.9 UG/ML — SIGNIFICANT CHANGE UP
WBC # BLD: 7.2 K/UL — SIGNIFICANT CHANGE UP (ref 3.8–10.5)
WBC # FLD AUTO: 7.2 K/UL — SIGNIFICANT CHANGE UP (ref 3.8–10.5)

## 2018-08-31 PROCEDURE — 99233 SBSQ HOSP IP/OBS HIGH 50: CPT

## 2018-08-31 PROCEDURE — 99231 SBSQ HOSP IP/OBS SF/LOW 25: CPT

## 2018-08-31 PROCEDURE — 93970 EXTREMITY STUDY: CPT | Mod: 26

## 2018-08-31 RX ORDER — QUETIAPINE FUMARATE 200 MG/1
12.5 TABLET, FILM COATED ORAL ONCE
Qty: 0 | Refills: 0 | Status: COMPLETED | OUTPATIENT
Start: 2018-08-31 | End: 2018-08-31

## 2018-08-31 RX ORDER — VANCOMYCIN HCL 1 G
1500 VIAL (EA) INTRAVENOUS ONCE
Qty: 0 | Refills: 0 | Status: COMPLETED | OUTPATIENT
Start: 2018-08-31 | End: 2018-08-31

## 2018-08-31 RX ORDER — POTASSIUM CHLORIDE 20 MEQ
40 PACKET (EA) ORAL ONCE
Qty: 0 | Refills: 0 | Status: COMPLETED | OUTPATIENT
Start: 2018-08-31 | End: 2018-08-31

## 2018-08-31 RX ORDER — METOPROLOL TARTRATE 50 MG
50 TABLET ORAL EVERY 12 HOURS
Qty: 0 | Refills: 0 | Status: DISCONTINUED | OUTPATIENT
Start: 2018-08-31 | End: 2018-09-01

## 2018-08-31 RX ORDER — CHLORHEXIDINE GLUCONATE 213 G/1000ML
1 SOLUTION TOPICAL DAILY
Qty: 0 | Refills: 0 | Status: DISCONTINUED | OUTPATIENT
Start: 2018-09-01 | End: 2018-09-07

## 2018-08-31 RX ORDER — LACTOBACILLUS ACIDOPHILUS 100MM CELL
1 CAPSULE ORAL
Qty: 0 | Refills: 0 | Status: DISCONTINUED | OUTPATIENT
Start: 2018-08-31 | End: 2018-09-07

## 2018-08-31 RX ORDER — QUETIAPINE FUMARATE 200 MG/1
12.5 TABLET, FILM COATED ORAL AT BEDTIME
Qty: 0 | Refills: 0 | Status: DISCONTINUED | OUTPATIENT
Start: 2018-08-31 | End: 2018-09-02

## 2018-08-31 RX ORDER — FOLIC ACID 0.8 MG
1 TABLET ORAL DAILY
Qty: 0 | Refills: 0 | Status: DISCONTINUED | OUTPATIENT
Start: 2018-09-01 | End: 2018-09-07

## 2018-08-31 RX ORDER — QUETIAPINE FUMARATE 200 MG/1
12.5 TABLET, FILM COATED ORAL EVERY 8 HOURS
Qty: 0 | Refills: 0 | Status: DISCONTINUED | OUTPATIENT
Start: 2018-08-31 | End: 2018-09-02

## 2018-08-31 RX ORDER — SALICYLIC ACID 0.5 %
1 CLEANSER (GRAM) TOPICAL DAILY
Qty: 0 | Refills: 0 | Status: DISCONTINUED | OUTPATIENT
Start: 2018-08-31 | End: 2018-09-07

## 2018-08-31 RX ADMIN — QUETIAPINE FUMARATE 12.5 MILLIGRAM(S): 200 TABLET, FILM COATED ORAL at 22:22

## 2018-08-31 RX ADMIN — Medication 1 APPLICATION(S): at 10:58

## 2018-08-31 RX ADMIN — QUETIAPINE FUMARATE 12.5 MILLIGRAM(S): 200 TABLET, FILM COATED ORAL at 19:20

## 2018-08-31 RX ADMIN — Medication 300 MILLIGRAM(S): at 18:42

## 2018-08-31 RX ADMIN — Medication 50 MILLIGRAM(S): at 16:10

## 2018-08-31 RX ADMIN — SODIUM CHLORIDE 80 MILLILITER(S): 9 INJECTION, SOLUTION INTRAVENOUS at 05:44

## 2018-08-31 RX ADMIN — CARBIDOPA AND LEVODOPA 1 TABLET(S): 25; 100 TABLET ORAL at 22:21

## 2018-08-31 RX ADMIN — Medication 1 TABLET(S): at 10:57

## 2018-08-31 RX ADMIN — CARBIDOPA AND LEVODOPA 1 TABLET(S): 25; 100 TABLET ORAL at 05:47

## 2018-08-31 RX ADMIN — Medication 1 TABLET(S): at 17:21

## 2018-08-31 RX ADMIN — FINASTERIDE 5 MILLIGRAM(S): 5 TABLET, FILM COATED ORAL at 10:57

## 2018-08-31 RX ADMIN — CARBIDOPA AND LEVODOPA 1 TABLET(S): 25; 100 TABLET ORAL at 12:17

## 2018-08-31 RX ADMIN — Medication 1 APPLICATION(S): at 11:27

## 2018-08-31 RX ADMIN — Medication 40 MILLIEQUIVALENT(S): at 07:28

## 2018-08-31 RX ADMIN — PIPERACILLIN AND TAZOBACTAM 200 GRAM(S): 4; .5 INJECTION, POWDER, LYOPHILIZED, FOR SOLUTION INTRAVENOUS at 10:57

## 2018-08-31 RX ADMIN — PIPERACILLIN AND TAZOBACTAM 200 GRAM(S): 4; .5 INJECTION, POWDER, LYOPHILIZED, FOR SOLUTION INTRAVENOUS at 05:47

## 2018-08-31 RX ADMIN — Medication 1 MILLIGRAM(S): at 12:17

## 2018-08-31 RX ADMIN — TAMSULOSIN HYDROCHLORIDE 0.8 MILLIGRAM(S): 0.4 CAPSULE ORAL at 22:22

## 2018-08-31 RX ADMIN — SODIUM CHLORIDE 80 MILLILITER(S): 9 INJECTION, SOLUTION INTRAVENOUS at 01:37

## 2018-08-31 RX ADMIN — Medication 25 MILLIGRAM(S): at 05:47

## 2018-08-31 RX ADMIN — PIPERACILLIN AND TAZOBACTAM 200 GRAM(S): 4; .5 INJECTION, POWDER, LYOPHILIZED, FOR SOLUTION INTRAVENOUS at 00:34

## 2018-08-31 RX ADMIN — SODIUM CHLORIDE 80 MILLILITER(S): 9 INJECTION, SOLUTION INTRAVENOUS at 12:18

## 2018-08-31 RX ADMIN — PIPERACILLIN AND TAZOBACTAM 200 GRAM(S): 4; .5 INJECTION, POWDER, LYOPHILIZED, FOR SOLUTION INTRAVENOUS at 17:21

## 2018-08-31 NOTE — PROGRESS NOTE ADULT - PROBLEM SELECTOR PLAN 6
seen by speech and swallow  cleared for mechanical soft diet with nectar thick liquids  toleratng diet well staff states he ate 100 % of breakfast, did require staff feeder

## 2018-08-31 NOTE — PROGRESS NOTE ADULT - PROBLEM SELECTOR PLAN 8
as promised, attempt to discuss EOL wishes with pt. Pt remains quite confused with no capacity to understand this difficult decision. Mrs Lutz is aware Will continue to assess pt for increasing capacity

## 2018-08-31 NOTE — PROGRESS NOTE ADULT - PROBLEM SELECTOR PLAN 8
F: LR @ 80cc/hr  E: replete K<4, Mg<2  N: Mechanical soft with nectar thin liquids; needs supervision, elevate bed 90 degrees, monitor for signs of aspiration.    VTE Prophylaxis: Coumadin  C: DNR/DNI  D: RMF

## 2018-08-31 NOTE — PROGRESS NOTE ADULT - PROBLEM SELECTOR PLAN 7
Pt has hx of alcohol abuse. Last drink prior to admission. Alcohol level 0 on admission. CIWA consistently 0  - c/w Folate    # Thrombocytopenia  - likely in setting of etoh abuse  - continue to trend CBC    # Anemia   - stable  - continue to trend CBC Pt has hx of alcohol abuse. Last drink prior to admission. Alcohol level 0 on admission. CIWA consistently 0. Pt received >1week of thiamine   - c/w Folate    # Thrombocytopenia  - likely in setting of etoh abuse  - continue to trend CBC    # Anemia   - stable  - continue to trend CBC

## 2018-08-31 NOTE — PROGRESS NOTE ADULT - PROBLEM SELECTOR PLAN 1
resolved'  may consider trial of removal of restraints   continue Seroquel 12.5 mg po q 12 hr with q 8 hr prn dosing

## 2018-08-31 NOTE — PROGRESS NOTE ADULT - SUBJECTIVE AND OBJECTIVE BOX
SADIA LUTZ   MRN-[id number]     ([id number]):     HPI:  89M with Parkinson's (on Sinemet), Afib (Toprol XL and Coumadin), HTN, BPH and ETOH abuse who was BIBEMS after being found down at home. Wife was out of town caring for another sick family member.  He was alone and probably fell 24-48 hour earlier.  On admission was in renal failure and found to have a UTI.  Toxic screen was negative for drugs or alcohol.    Subjective:  pt without delirium this AM. Received dosing of Seroquel with good effect, Remains quite confused oriented to name only. /o some discomfort rectally    ROS:    Unable to obtain due to: delirium                    Dyspnea (Carissa 0-10):   N              N/V (Y/N):                N              Secretions (Y/N) :    N            Agitation(Y/N):        N  Pain (Y/N):     pt reports some discomfort to his rectum. Pt is awaiting the technician for Doppler of BLE to R/O DVT but  team will fully evaluate area following doppler    Allergies    aspirin (Unknown)    Intolerances    Opiate Naive (Y/N): yes, med list obtained from patient's pharmacy    Medications:      MEDICATIONS  (STANDING):  MEDICATIONS  (STANDING):  BACItracin   Ointment 1 Application(s) Topical daily  Biotene Dry Mouth Oral Rinse 5 milliLiter(s) Swish and Spit daily  carbidopa/levodopa  25/100 1 Tablet(s) Oral three times a day  erythromycin   Ointment 1 Application(s) Right EYE daily  finasteride 5 milliGRAM(s) Oral daily  folic acid Injectable 1 milliGRAM(s) IV Push daily  lactated ringers. 1000 milliLiter(s) (80 mL/Hr) IV Continuous <Continuous>  lactobacillus acidophilus 1 Tablet(s) Oral three times a day with meals  metoprolol tartrate 25 milliGRAM(s) Oral two times a day  piperacillin/tazobactam IVPB. 3.375 Gram(s) IV Intermittent every 6 hours  tamsulosin 0.8 milliGRAM(s) Oral at bedtime  vitamin A &amp; D Ointment 1 Application(s) Topical daily    MEDICATIONS  (PRN):  acetaminophen  Suppository 650 milliGRAM(s) Rectal every 6 hours PRN For Temp greater than 38 C (100.4 F)    Labs:                                                          10.0   7.2   )-----------( 160      ( 31 Aug 2018 06:41 )             31.1   08-31    139  |  108  |  14  ----------------------------<  95  3.6   |  19<L>  |  1.12    Ca    8.4      31 Aug 2018 06:40  Phos  2.4     08-30  Mg     1.8     08-30    Feces: GI PCR neg  C Diff neg    Imaging: no new imaging    PEx:  Vital Signs Last 24 Hrs  T(C): 36.4 (31 Aug 2018 10:24), Max: 37.4 (30 Aug 2018 22:00)  T(F): 97.5 (31 Aug 2018 10:24), Max: 99.3 (30 Aug 2018 22:00)  HR: 106 (31 Aug 2018 12:23) (94 - 118)  BP: 157/85 (31 Aug 2018 12:23) (106/69 - 157/85)  BP(mean): 112 (31 Aug 2018 12:23) (82 - 114)  RR: 14 (31 Aug 2018 12:23) (14 - 28)  SpO2: 99% (31 Aug 2018 12:23) (98% - 100%)    General: elderly gentleman in bed, onfused, pleasant in NAD    HEENT:  non-icteric sclera, multiple healing  bruises abrasion on chin and both cheeks, dry mucous membranes   Neck: supple no JVD  CVS :irregularly no murmur heard tachycardia  Resp: CTA B/L no RRW  GI:  normal BS, non-tender, + rectal tube loose brown stool   :  chan in situ  Musc:   equal strength, no significant wasting, no joint erythema or effusion  Neuro: alert, oriented  to name only, states he was the  p resident, no focal deficits   Psych:   confused, calm and cooperative   Skin: diffuse actinic keratoses and evidence of sun damage, multiple abrasions including both knees  Lymph:no adenopathy  Preadmit Karnofsky: presumed 60-70%           Current Karnofsky:     30%  Cachexia (Y/N): no  BMI: 25.4    Advanced Directives:     DNR/DNI     MOLST       Decision maker: Patient presently not able to make complex medical decisions  Legal surrogate: wife Angle Lutz            Documentation of GOC: to recover from this event, to go to rehab.  Wife presently overwhelmed regarding family illness  Hoping for improvement in strength and mental status.               REFERRALS	       Palliative Med               PT/OT

## 2018-08-31 NOTE — PROGRESS NOTE ADULT - PROBLEM SELECTOR PLAN 5
In the setting of dehydration and obstruction from BPH, underlying CKD 3  - Cr today 1.33 (improving)  - cont IVF and trend BMP's    #diarrhea- pt w diarrhea. rectal tube in place. cdiff ruled out  - f/u GI PCR  - continue to monitor  - replete fluids In the setting of dehydration and obstruction from BPH, underlying CKD 3  - Cr today 1.12 (improving)  - cont IVF and trend BMP's  - f/u vanc level and dose according to level to avoid further kidney injury    #diarrhea- pt w diarrhea. rectal tube in place. cdiff ruled out. GI PCR neg  - started on probiotics today  - continue to monitor  - replete fluids

## 2018-08-31 NOTE — PROGRESS NOTE ADULT - SUBJECTIVE AND OBJECTIVE BOX
Patient is a 89y old  Male who presents with a chief complaint of Toxic Metabolic Encephalopathy, Acute Renal Failure, (21 Aug 2018 16:56)        INTERVAL HPI/OVERNIGHT EVENTS: none    SYMPTOMS still mild agitated    DRIPS none        ICU Vital Signs Last 24 Hrs  T(C): 36.4 (31 Aug 2018 10:24), Max: 37.4 (30 Aug 2018 22:00)  T(F): 97.5 (31 Aug 2018 10:24), Max: 99.3 (30 Aug 2018 22:00)  HR: 94 (31 Aug 2018 09:19) (94 - 118)  BP: 106/69 (31 Aug 2018 09:19) (106/69 - 151/81)  BP(mean): 82 (31 Aug 2018 09:19) (82 - 114)  ABP: --  ABP(mean): --  RR: 16 (31 Aug 2018 09:19) (16 - 28)  SpO2: 100% (31 Aug 2018 09:19) (98% - 100%)      I&O's Summary    30 Aug 2018 07:01  -  31 Aug 2018 07:00  --------------------------------------------------------  IN: 1400 mL / OUT: 3375 mL / NET: -1975 mL        EXAM    Chest ronchi    Heart rr    Abdomen soft nontender with bs    Extremities trace edema    Neuro confused, mildy agitated      LABS:                            10.0   7.2   )-----------( 160      ( 31 Aug 2018 06:41 )             31.1     08-31    139  |  108  |  14  ----------------------------<  95  3.6   |  19<L>  |  1.12    Ca    8.4      31 Aug 2018 06:40  Phos  2.4     08-30  Mg     1.8     08-30      PT/INR - ( 31 Aug 2018 06:40 )   PT: 27.7 sec;   INR: 2.45          PTT - ( 31 Aug 2018 06:40 )  PTT:33.2 sec    sputum -MRSA      RADIOLOGY & ADDITIONAL STUDIES:    CRITICAL CARE TIME SPENT:

## 2018-08-31 NOTE — PROGRESS NOTE ADULT - PROBLEM SELECTOR PLAN 1
8/27 pt found to have rigors and was ill appearing with tachycardia to 143, temp to 101.4, RR of 30 and oxygen saturation of 80% on RA. CXR showed new NORMA opacity consistent with aspiration PNA. Lactate 2.7 (trended down to 1.8), and leukocytosis to 11.3 with left shift. Pt was fluid resuscitated with 1L bolus NS and fluids continued. On zosyn for empiric coverage  - lactate improved  - leukocytosis improved to 9.3 today, continue to trend  - c/w zosyn  - f/u blood cx  - pt also received 7 day course Ceftriaxone 1g q24h last dose on 27th for sepsis 2/2 UTI 8/27 pt found to have rigors and was ill appearing with tachycardia to 143, temp to 101.4, RR of 30 and oxygen saturation of 80% on RA. CXR showed new NORMA opacity consistent with aspiration PNA. Lactate 2.7 (trended down to 1.8), and leukocytosis to 11.3 with left shift. Pt was fluid resuscitated with 1L bolus NS and fluids continued. Zosyn was started for empiric coverage  - lactate improved  - sputum cx showed MRSA, 1st dose of vanc 8/30 PM  - leukocytosis improved further to 7.2 today, continue to trend  - c/w zosyn  - c/w vanco; monitor levels  - f/u blood cx  - pt also received 7 day course Ceftriaxone 1g q24h last dose on 27th for sepsis 2/2 UTI

## 2018-08-31 NOTE — PROGRESS NOTE ADULT - ASSESSMENT
A -delrium/dementia/parkisnons/ aspiration pneumonia    Suggest;  add vanc pitptazo for 7 days each  follow vanc levels  seraquel per psyche  continue parkinsons meds  up in a chair

## 2018-08-31 NOTE — PROGRESS NOTE ADULT - SUBJECTIVE AND OBJECTIVE BOX
INTERVAL HPI/OVERNIGHT EVENTS:  Patient was seen and examined at bedside. As per nurse and patient, no o/n events, patient resting comfortably. No complaints at this time. Patient denies: fever, chills, dizziness, weakness, HA, Changes in vision, CP, palpitations, SOB, cough, N/V/D/C, dysuria, changes in bowel movements, LE edema. ROS otherwise negative.    VITAL SIGNS:  T(F): 97.6 (08-31-18 @ 06:00)  HR: 99 (08-31-18 @ 05:48)  BP: 132/83 (08-31-18 @ 05:47)  RR: 20 (08-31-18 @ 05:48)  SpO2: 100% (08-31-18 @ 05:48)  Wt(kg): --    PHYSICAL EXAM:    Constitutional: WDWN, NAD  HEENT: PERRL, EOMI, sclera non-icteric, neck supple, trachea midline, no masses, no JVD, MMM, good dentition  Respiratory: CTA b/l, good air entry b/l, no wheezing, no rhonchi, no rales, without accessory muscle use and no intercostal retractions  Cardiovascular: RRR, normal S1S2, no M/R/G  Gastrointestinal: soft, NTND, no masses palpable, BS normal  Extremities: Warm, well perfused, pulses equal bilateral upper and lower extremities, no edema, no clubbing. Capillary refill <2 sec  Neurological: AAOx3, CN Grossly intact  Skin: Normal temperature, warm, dry    MEDICATIONS  (STANDING):  BACItracin   Ointment 1 Application(s) Topical daily  Biotene Dry Mouth Oral Rinse 5 milliLiter(s) Swish and Spit daily  carbidopa/levodopa  25/100 1 Tablet(s) Oral three times a day  erythromycin   Ointment 1 Application(s) Right EYE daily  finasteride 5 milliGRAM(s) Oral daily  folic acid Injectable 1 milliGRAM(s) IV Push daily  lactated ringers. 1000 milliLiter(s) (80 mL/Hr) IV Continuous <Continuous>  metoprolol tartrate 25 milliGRAM(s) Oral two times a day  piperacillin/tazobactam IVPB. 3.375 Gram(s) IV Intermittent every 6 hours  tamsulosin 0.8 milliGRAM(s) Oral at bedtime  vitamin A &amp; D Ointment 1 Application(s) Topical daily    MEDICATIONS  (PRN):  acetaminophen  Suppository 650 milliGRAM(s) Rectal every 6 hours PRN For Temp greater than 38 C (100.4 F)      Allergies    aspirin (Unknown)    Intolerances        LABS:                        10.5   9.3   )-----------( 155      ( 30 Aug 2018 10:51 )             33.1     08-30    138  |  108  |  20  ----------------------------<  97  3.8   |  15<L>  |  1.33<H>    Ca    8.1<L>      30 Aug 2018 06:46  Phos  2.4     08-30  Mg     1.8     08-30      PT/INR - ( 30 Aug 2018 06:47 )   PT: 34.3 sec;   INR: 3.02          PTT - ( 30 Aug 2018 06:47 )  PTT:31.9 sec      RADIOLOGY & ADDITIONAL TESTS:  Reviewed INTERVAL HPI/OVERNIGHT EVENTS:  Overnight, pt was given trial dose of seroquel with positive effect. He had one episode of tachycardia overnight to the 130s and was given an additional 12.5 of lopressor. Manual palpation showed HR of 80.   Today AM, pt seen and evaluated at bedside. Pt has no complaints. He denies SOB, CP, palpitations, N/V, abdominal pain.    VITAL SIGNS:  T(F): 97.6 (08-31-18 @ 06:00)  HR: 99 (08-31-18 @ 05:48)  BP: 132/83 (08-31-18 @ 05:47)  RR: 20 (08-31-18 @ 05:48)  SpO2: 100% (08-31-18 @ 05:48)  Wt(kg): --    PHYSICAL EXAM:    Constitutional: elderly male laying in bed in NAD   Respiratory: Good inspiratory effort, + rhonchi, no wheezing or rales, without accessory muscle use and no intercostal retractions  Cardiovascular: Tachycardic, normal S1S2, no M/R/G  Gastrointestinal: soft, NTND, no masses palpable, BS normal  : Good in situ draining yellow urine, rectal tube in place draining dark loose stool   Extremities: Warm, well perfused, pulses equal bilateral upper and lower extremities, no edema; L upper arm with deformity 2/2 trauma  Skin: multiple healing contusions on face and chin; chin entirely healed, cheeks with scabs; well-healing scab on L knee    MEDICATIONS  (STANDING):  BACItracin   Ointment 1 Application(s) Topical daily  Biotene Dry Mouth Oral Rinse 5 milliLiter(s) Swish and Spit daily  carbidopa/levodopa  25/100 1 Tablet(s) Oral three times a day  erythromycin   Ointment 1 Application(s) Right EYE daily  finasteride 5 milliGRAM(s) Oral daily  folic acid Injectable 1 milliGRAM(s) IV Push daily  lactated ringers. 1000 milliLiter(s) (80 mL/Hr) IV Continuous <Continuous>  metoprolol tartrate 25 milliGRAM(s) Oral two times a day  piperacillin/tazobactam IVPB. 3.375 Gram(s) IV Intermittent every 6 hours  tamsulosin 0.8 milliGRAM(s) Oral at bedtime  vitamin A &amp; D Ointment 1 Application(s) Topical daily    MEDICATIONS  (PRN):  acetaminophen  Suppository 650 milliGRAM(s) Rectal every 6 hours PRN For Temp greater than 38 C (100.4 F)      Allergies    aspirin (Unknown)    Intolerances        LABS:                        10.5   9.3   )-----------( 155      ( 30 Aug 2018 10:51 )             33.1     08-30    138  |  108  |  20  ----------------------------<  97  3.8   |  15<L>  |  1.33<H>    Ca    8.1<L>      30 Aug 2018 06:46  Phos  2.4     08-30  Mg     1.8     08-30      PT/INR - ( 30 Aug 2018 06:47 )   PT: 34.3 sec;   INR: 3.02          PTT - ( 30 Aug 2018 06:47 )  PTT:31.9 sec      RADIOLOGY & ADDITIONAL TESTS:  Reviewed

## 2018-08-31 NOTE — PROGRESS NOTE ADULT - PROBLEM SELECTOR PLAN 3
- pt originally presented with UTI in AMS  - currently A&Ox1 to person only  - AMS likely 2/2 sepsis vs. delirium, will cont to monitor and treat underlying disease    #delirium- pt continues to be oriented only to self with agitation overnight. Likely 2/2 delirium in the setting of sepsis/hospitalization and possible underlying dementia from Parkinson's  - f/u psychiatry recs  - try to orient patient  - continue to monitor - pt originally presented with UTI in AMS  - currently A&Ox1 to person only  - AMS likely 2/2 sepsis vs. delirium, will cont to monitor and treat underlying disease    #delirium- pt continues to be oriented only to self with agitation. Likely 2/2 delirium in the setting of sepsis/hospitalization and possible underlying dementia from Parkinson's  - as per psychiatry, pt received 1 dose of seroquel 8/30 at bedtime with positive effect  - c/w seroquel 12.5 qhs  - continue to orient patient  - continue to monitor

## 2018-08-31 NOTE — CHART NOTE - NSCHARTNOTEFT_GEN_A_CORE
Admitting Diagnosis:   Patient is a 89y old  Male who presents with a chief complaint of Toxic Metabolic Encephalopathy, Acute Renal Failure, (21 Aug 2018 16:56)      PAST MEDICAL & SURGICAL HISTORY:  Parkinson's disease  BPH (benign prostatic hyperplasia)  HLD (hyperlipidemia)  HTN (hypertension)  Atrial fibrillation  No significant past surgical history      Current Nutrition Order:  Mechanical soft/nectar thick liquids        PO Intake: Good (%) [ X  ]  Fair (50-75%) [   ] Poor (<25%) [   ]    GI Issues: No N/V. +Rectal tube 2/2 diarrhea     Pain: No pain reported     Skin Integrity: Facial abrasions, IAD    Labs:   08-31    139  |  108  |  14  ----------------------------<  95  3.6   |  19<L>  |  1.12    Ca    8.4      31 Aug 2018 06:40  Phos  2.4     08-30  Mg     1.8     08-30      CAPILLARY BLOOD GLUCOSE      POCT Blood Glucose.: 93 mg/dL (30 Aug 2018 16:23)      Medications:  MEDICATIONS  (STANDING):  BACItracin   Ointment 1 Application(s) Topical daily  Biotene Dry Mouth Oral Rinse 5 milliLiter(s) Swish and Spit daily  carbidopa/levodopa  25/100 1 Tablet(s) Oral three times a day  erythromycin   Ointment 1 Application(s) Right EYE daily  finasteride 5 milliGRAM(s) Oral daily  lactated ringers. 1000 milliLiter(s) (80 mL/Hr) IV Continuous <Continuous>  lactobacillus acidophilus 1 Tablet(s) Oral three times a day with meals  metoprolol tartrate 25 milliGRAM(s) Oral two times a day  piperacillin/tazobactam IVPB. 3.375 Gram(s) IV Intermittent every 6 hours  tamsulosin 0.8 milliGRAM(s) Oral at bedtime  vitamin A &amp; D Ointment 1 Application(s) Topical daily    MEDICATIONS  (PRN):  acetaminophen  Suppository 650 milliGRAM(s) Rectal every 6 hours PRN For Temp greater than 38 C (100.4 F)      Weight: 85kg  Daily     Daily     Weight Change: No new weights recorded since admit     Estimated energy needs: ABW used for calculations as pt between % of IBW. Needs estimated for maintenance in older adults; adjusted for current clinical status   Calories: 25-30 kcal/kg = 8580-0543 kcal/day  Protein: 1.2-1.4 g/kg = 102-119 g protein/day  Fluids: 30-35 mL/kg = 1878-7897 mL/day    Subjective: 89 yo/male with PMHx Parkinsons, Afib, HTN, BPH, EtOH abuse, presented after being found down at home. Found to be in severe sepsis and toxic metabolic encephalopathy possible 2/2 UTI. On 8/28 patient developed rigors, hypotension, afib- transferred to MICU s/p rapid response. Pt now stable on 7 Lachman again. S/p FEES on 8/29, recommending mechanical soft with nectar thick liquid diet, with high aspiration precautions. Pt seen in room, awake, more alert today, breathing comfortably on NC. Pt endorses eating well at breakfast this AM, per RN he ate 100% with assistance. Pt denies complaints of N/V. Rectal tube in place 2/2 increased diarrhea. R/o c. diff. Palliative continues to see patient to help define goals of care; will continue to monitor and keep nutrition aligned at all times.     Previous Nutrition Diagnosis:  Inadequate energy intake RT current NPO status 2/2 dysphagia and AMS AEB 0% of EER being met at this time    Active [   ]  Resolved [ X  ]    If resolved, new PES: Increased nutrient needs RT increased demand AEB current clinical state     Goal: Pt will meet % of EER per day with good tolerance     Recommendations:  1. Continue current diet order; encourage PO intake, provide assistance where necessary, maintain aspiration precautions at all times   2. Monitor lytes and replete prn.   3. Keep nutrition aligned with GOC at all times    Education: Encouraged intake; obtained food preferences     Risk Level: High [   ] Moderate [ X  ] Low [   ].

## 2018-08-31 NOTE — PROGRESS NOTE ADULT - PROBLEM SELECTOR PLAN 2
Pt has sputum culture positive for MRSA.  - patient started on linezolid; monitor for signs of serotonin syndrome  - c/w zosyn  - f/u blood cx  - continue to trend CBC    # Hypoxic hypercapnic respiratory failure likely 2/2 PNA  -Pt found to have PNA w/ increased respiratory needs. ABG showed met acidosis with resp alkalosis  - pt deescalated from HFNC to NC 6L today  - will cont with oxygen supplementation and pulmonary toilet Pt has sputum culture positive for MRSA.  - pt did not receive linezolid; started on vanco 8/30 PM  - c/w vanco; f/u vanc levels and redose accordingly  - c/w zosyn  - f/u blood cx  - continue to trend CBC    # Hypoxic hypercapnic respiratory failure likely 2/2 PNA  -Pt found to have PNA w/ increased respiratory needs. ABG showed met acidosis with resp alkalosis  - pt now on 4L NC  - will cont with oxygen supplementation and pulmonary toilet

## 2018-08-31 NOTE — PROGRESS NOTE ADULT - PROBLEM SELECTOR PLAN 4
Pt has h/o of a-fib on home Coumadin and Toprol XL 200mg. Was difficult to rate control earlier this admission likely 2/2 missed doses of Toprol XL  - on lopressor for rate control. Pt has been tachycardic 90s-120s  - Continue lopressor  - f/u INRs for need for coumadin vs. lovenox for anticoagulation Pt has h/o of a-fib on home Coumadin and Toprol XL 200mg. Was difficult to rate control earlier this admission likely 2/2 missed doses of Toprol XL  - on lopressor for rate control. Pt has been tachycardic 90s-120s  - Continue lopressor  - f/u INRs; consider switching to eliquis when INR<2

## 2018-08-31 NOTE — PROGRESS NOTE BEHAVIORAL HEALTH - SUMMARY
89M h/o EtOH use disorder, Parkinson's, presenting with AMS in setting of fall at home, acute renal failure, UTI, and now PNA.  Likely EtOH use prior to admission (BAL 0 on this admission but 165 on 8/3) and patient predisposed to delirium given PD.  Continue Seroquel as below for delirium/agitation as lower risk of EPS than many other antipsychotics; would not plan to discharge on this medication.

## 2018-08-31 NOTE — PROGRESS NOTE BEHAVIORAL HEALTH - NSBHFUPINTERVALHXFT_PSY_A_CORE
Patient seen at bedside.  As per chart no agitation this morning, responded well to Seroquel.  On interview this afternoon sleeping, woke easily, speech somewhat difficult to understand.  Denied pain, oriented to self and "18" but not location.  Somewhat irritable, disorganized.

## 2018-09-01 LAB
ANION GAP SERPL CALC-SCNC: 12 MMOL/L — SIGNIFICANT CHANGE UP (ref 5–17)
APTT BLD: 32 SEC — SIGNIFICANT CHANGE UP (ref 27.5–37.4)
BUN SERPL-MCNC: 11 MG/DL — SIGNIFICANT CHANGE UP (ref 7–23)
CALCIUM SERPL-MCNC: 8.4 MG/DL — SIGNIFICANT CHANGE UP (ref 8.4–10.5)
CHLORIDE SERPL-SCNC: 105 MMOL/L — SIGNIFICANT CHANGE UP (ref 96–108)
CO2 SERPL-SCNC: 23 MMOL/L — SIGNIFICANT CHANGE UP (ref 22–31)
CREAT SERPL-MCNC: 1.18 MG/DL — SIGNIFICANT CHANGE UP (ref 0.5–1.3)
GLUCOSE SERPL-MCNC: 114 MG/DL — HIGH (ref 70–99)
HCT VFR BLD CALC: 29.9 % — LOW (ref 39–50)
HGB BLD-MCNC: 9.7 G/DL — LOW (ref 13–17)
INR BLD: 1.91 — HIGH (ref 0.88–1.16)
MAGNESIUM SERPL-MCNC: 1.6 MG/DL — SIGNIFICANT CHANGE UP (ref 1.6–2.6)
MCHC RBC-ENTMCNC: 31.6 PG — SIGNIFICANT CHANGE UP (ref 27–34)
MCHC RBC-ENTMCNC: 32.4 G/DL — SIGNIFICANT CHANGE UP (ref 32–36)
MCV RBC AUTO: 97.4 FL — SIGNIFICANT CHANGE UP (ref 80–100)
PHOSPHATE SERPL-MCNC: 2.4 MG/DL — LOW (ref 2.5–4.5)
PLATELET # BLD AUTO: 170 K/UL — SIGNIFICANT CHANGE UP (ref 150–400)
POTASSIUM SERPL-MCNC: 3.7 MMOL/L — SIGNIFICANT CHANGE UP (ref 3.5–5.3)
POTASSIUM SERPL-SCNC: 3.7 MMOL/L — SIGNIFICANT CHANGE UP (ref 3.5–5.3)
PROTHROM AB SERPL-ACNC: 21.5 SEC — HIGH (ref 9.8–12.7)
RBC # BLD: 3.07 M/UL — LOW (ref 4.2–5.8)
RBC # FLD: 13.2 % — SIGNIFICANT CHANGE UP (ref 10.3–16.9)
SODIUM SERPL-SCNC: 140 MMOL/L — SIGNIFICANT CHANGE UP (ref 135–145)
VANCOMYCIN FLD-MCNC: 13.6 UG/ML — SIGNIFICANT CHANGE UP
WBC # BLD: 6.4 K/UL — SIGNIFICANT CHANGE UP (ref 3.8–10.5)
WBC # FLD AUTO: 6.4 K/UL — SIGNIFICANT CHANGE UP (ref 3.8–10.5)

## 2018-09-01 PROCEDURE — 73562 X-RAY EXAM OF KNEE 3: CPT | Mod: 26,LT

## 2018-09-01 PROCEDURE — 99233 SBSQ HOSP IP/OBS HIGH 50: CPT | Mod: GC

## 2018-09-01 RX ORDER — POTASSIUM CHLORIDE 20 MEQ
40 PACKET (EA) ORAL ONCE
Qty: 0 | Refills: 0 | Status: COMPLETED | OUTPATIENT
Start: 2018-09-01 | End: 2018-09-01

## 2018-09-01 RX ORDER — METOPROLOL TARTRATE 50 MG
25 TABLET ORAL ONCE
Qty: 0 | Refills: 0 | Status: DISCONTINUED | OUTPATIENT
Start: 2018-09-01 | End: 2018-09-01

## 2018-09-01 RX ORDER — METOPROLOL TARTRATE 50 MG
75 TABLET ORAL
Qty: 0 | Refills: 0 | Status: DISCONTINUED | OUTPATIENT
Start: 2018-09-02 | End: 2018-09-06

## 2018-09-01 RX ORDER — LOPERAMIDE HCL 2 MG
4 TABLET ORAL ONCE
Qty: 0 | Refills: 0 | Status: COMPLETED | OUTPATIENT
Start: 2018-09-01 | End: 2018-09-01

## 2018-09-01 RX ORDER — METOPROLOL TARTRATE 50 MG
25 TABLET ORAL ONCE
Qty: 0 | Refills: 0 | Status: COMPLETED | OUTPATIENT
Start: 2018-09-01 | End: 2018-09-01

## 2018-09-01 RX ORDER — MAGNESIUM SULFATE 500 MG/ML
2 VIAL (ML) INJECTION ONCE
Qty: 0 | Refills: 0 | Status: COMPLETED | OUTPATIENT
Start: 2018-09-01 | End: 2018-09-01

## 2018-09-01 RX ORDER — VANCOMYCIN HCL 1 G
1500 VIAL (EA) INTRAVENOUS ONCE
Qty: 0 | Refills: 0 | Status: COMPLETED | OUTPATIENT
Start: 2018-09-01 | End: 2018-09-01

## 2018-09-01 RX ORDER — APIXABAN 2.5 MG/1
5 TABLET, FILM COATED ORAL EVERY 12 HOURS
Qty: 0 | Refills: 0 | Status: DISCONTINUED | OUTPATIENT
Start: 2018-09-01 | End: 2018-09-07

## 2018-09-01 RX ADMIN — Medication 1 APPLICATION(S): at 11:46

## 2018-09-01 RX ADMIN — SODIUM CHLORIDE 80 MILLILITER(S): 9 INJECTION, SOLUTION INTRAVENOUS at 06:17

## 2018-09-01 RX ADMIN — SODIUM CHLORIDE 80 MILLILITER(S): 9 INJECTION, SOLUTION INTRAVENOUS at 04:54

## 2018-09-01 RX ADMIN — Medication 1 APPLICATION(S): at 11:47

## 2018-09-01 RX ADMIN — PIPERACILLIN AND TAZOBACTAM 200 GRAM(S): 4; .5 INJECTION, POWDER, LYOPHILIZED, FOR SOLUTION INTRAVENOUS at 06:17

## 2018-09-01 RX ADMIN — TAMSULOSIN HYDROCHLORIDE 0.8 MILLIGRAM(S): 0.4 CAPSULE ORAL at 21:32

## 2018-09-01 RX ADMIN — Medication 1 TABLET(S): at 11:46

## 2018-09-01 RX ADMIN — PIPERACILLIN AND TAZOBACTAM 200 GRAM(S): 4; .5 INJECTION, POWDER, LYOPHILIZED, FOR SOLUTION INTRAVENOUS at 11:45

## 2018-09-01 RX ADMIN — CARBIDOPA AND LEVODOPA 1 TABLET(S): 25; 100 TABLET ORAL at 14:16

## 2018-09-01 RX ADMIN — Medication 5 MILLILITER(S): at 11:47

## 2018-09-01 RX ADMIN — Medication 1 MILLIGRAM(S): at 11:46

## 2018-09-01 RX ADMIN — Medication 50 MILLIGRAM(S): at 18:03

## 2018-09-01 RX ADMIN — Medication 50 MILLIGRAM(S): at 06:17

## 2018-09-01 RX ADMIN — PIPERACILLIN AND TAZOBACTAM 200 GRAM(S): 4; .5 INJECTION, POWDER, LYOPHILIZED, FOR SOLUTION INTRAVENOUS at 00:27

## 2018-09-01 RX ADMIN — CARBIDOPA AND LEVODOPA 1 TABLET(S): 25; 100 TABLET ORAL at 21:32

## 2018-09-01 RX ADMIN — Medication 1 TABLET(S): at 07:30

## 2018-09-01 RX ADMIN — PIPERACILLIN AND TAZOBACTAM 200 GRAM(S): 4; .5 INJECTION, POWDER, LYOPHILIZED, FOR SOLUTION INTRAVENOUS at 18:03

## 2018-09-01 RX ADMIN — APIXABAN 5 MILLIGRAM(S): 2.5 TABLET, FILM COATED ORAL at 18:03

## 2018-09-01 RX ADMIN — Medication 4 MILLIGRAM(S): at 09:24

## 2018-09-01 RX ADMIN — CARBIDOPA AND LEVODOPA 1 TABLET(S): 25; 100 TABLET ORAL at 06:17

## 2018-09-01 RX ADMIN — Medication 40 MILLIEQUIVALENT(S): at 07:29

## 2018-09-01 RX ADMIN — Medication 300 MILLIGRAM(S): at 21:32

## 2018-09-01 RX ADMIN — Medication 50 GRAM(S): at 07:29

## 2018-09-01 RX ADMIN — Medication 1 TABLET(S): at 18:03

## 2018-09-01 RX ADMIN — Medication 25 MILLIGRAM(S): at 21:32

## 2018-09-01 RX ADMIN — QUETIAPINE FUMARATE 12.5 MILLIGRAM(S): 200 TABLET, FILM COATED ORAL at 21:33

## 2018-09-01 RX ADMIN — FINASTERIDE 5 MILLIGRAM(S): 5 TABLET, FILM COATED ORAL at 11:46

## 2018-09-01 NOTE — PROGRESS NOTE ADULT - PROBLEM SELECTOR PLAN 5
In the setting of dehydration and obstruction from BPH, underlying CKD 3  - Cr today 1.12 (improving)  - cont IVF and trend BMP's  - f/u vanc level and dose according to level to avoid further kidney injury    #diarrhea- pt w diarrhea. rectal tube in place. cdiff ruled out. GI PCR neg  - started on probiotics today  - continue to monitor  - replete fluids In the setting of dehydration and obstruction from BPH, underlying CKD 3  - Cr today 1.18 (improving)  - trend BMP's  - dc'ing fluids as Cr improved and pt now tolerating PO intake  - f/u vanc level and dose according to level to avoid further kidney injury    #diarrhea- pt w diarrhea. rectal tube in place. cdiff ruled out. GI PCR neg  - c/w probiotics today  - started pt on loperamide today  - continue to monitor In the setting of dehydration and obstruction from BPH, underlying CKD 3  - Cr today 1.18 (improving)  - trend BMP's  - dc'ing fluids as Cr improved and pt now tolerating PO intake  - f/u vanc level and dose according to level to avoid further kidney injury    #diarrhea- pt w diarrhea. rectal tube in place. cdiff ruled out. GI PCR neg  - c/w probiotics  - started pt on loperamide today  - continue to monitor

## 2018-09-01 NOTE — PROGRESS NOTE ADULT - ATTENDING COMMENTS
Remains confused but calm this AM and interactive and focused when communicating. Continue Seroquel as ordered and dysphagia diet. Eliquis to continue. Continue antibiotics and plans as outlined. PT

## 2018-09-01 NOTE — PROVIDER CONTACT NOTE (MEDICATION) - RECOMMENDATIONS
Continue to monitor. Trev MACEDO made aware. Pt straight cath. Continue to monitor. Trev MACEDO made aware. Pt straight cath. Adjust metoprolol dosage

## 2018-09-01 NOTE — PROGRESS NOTE ADULT - PROBLEM SELECTOR PLAN 2
Pt has sputum culture positive for MRSA.  - pt did not receive linezolid; started on vanco 8/30 PM  - c/w vanco; f/u vanc levels and redose accordingly  - c/w zosyn  - f/u blood cx  - continue to trend CBC    # Hypoxic hypercapnic respiratory failure likely 2/2 PNA  -Pt found to have PNA w/ increased respiratory needs. ABG showed met acidosis with resp alkalosis  - pt now on 4L NC  - will cont with oxygen supplementation and pulmonary toilet Pt has sputum culture positive for MRSA.  - c/w vanco; f/u vanc levels and redose accordingly (needs 5 day course of therapeutic levels 15-20)  - c/w zosyn (last day 9/3 for 7 day course)  - f/u blood cx  - continue to trend CBC    # Hypoxic hypercapnic respiratory failure likely 2/2 PNA  - Pt found to have PNA w/ increased respiratory needs. ABG showed met acidosis with resp alkalosis  - pt now on 2L NC; will titrate to RA today if tolerated  - will cont with oxygen supplementation and pulmonary toilet

## 2018-09-01 NOTE — PROGRESS NOTE ADULT - SUBJECTIVE AND OBJECTIVE BOX
INTERVAL HPI/OVERNIGHT EVENTS:  Patient was seen and examined at bedside. As per nurse and patient, no o/n events, patient resting comfortably. No complaints at this time. Patient denies: fever, chills, dizziness, weakness, HA, Changes in vision, CP, palpitations, SOB, cough, N/V/D/C, dysuria, changes in bowel movements, LE edema. ROS otherwise negative.    VITAL SIGNS:  T(F): 98 (08-31-18 @ 21:01)  HR: 108 (09-01-18 @ 04:08)  BP: 121/59 (09-01-18 @ 04:08)  RR: 16 (09-01-18 @ 04:08)  SpO2: 100% (09-01-18 @ 04:08)  Wt(kg): --    PHYSICAL EXAM:    Constitutional: elderly male laying in bed in NAD   Respiratory: Good inspiratory effort, + rhonchi, no wheezing or rales, without accessory muscle use and no intercostal retractions  Cardiovascular: Tachycardic, normal S1S2, no M/R/G  Gastrointestinal: soft, NTND, no masses palpable, BS normal  : Good in situ draining yellow urine, rectal tube in place draining dark loose stool   Extremities: Warm, well perfused, pulses equal bilateral upper and lower extremities, no edema; L upper arm with deformity 2/2 trauma  Skin: multiple healing contusions on face and chin; chin entirely healed, cheeks with scabs; well-healing scab on L knee    MEDICATIONS  (STANDING):  BACItracin   Ointment 1 Application(s) Topical daily  Biotene Dry Mouth Oral Rinse 5 milliLiter(s) Swish and Spit daily  carbidopa/levodopa  25/100 1 Tablet(s) Oral three times a day  chlorhexidine 2% Cloths 1 Application(s) Topical daily  erythromycin   Ointment 1 Application(s) Right EYE daily  finasteride 5 milliGRAM(s) Oral daily  folic acid 1 milliGRAM(s) Oral daily  lactated ringers. 1000 milliLiter(s) (80 mL/Hr) IV Continuous <Continuous>  lactobacillus acidophilus 1 Tablet(s) Oral three times a day with meals  magnesium sulfate  IVPB 2 Gram(s) IV Intermittent once  metoprolol tartrate 50 milliGRAM(s) Oral every 12 hours  piperacillin/tazobactam IVPB. 3.375 Gram(s) IV Intermittent every 6 hours  potassium chloride   Powder 40 milliEquivalent(s) Oral once  QUEtiapine 12.5 milliGRAM(s) Oral at bedtime  tamsulosin 0.8 milliGRAM(s) Oral at bedtime  vitamin A &amp; D Ointment 1 Application(s) Topical daily    MEDICATIONS  (PRN):  acetaminophen  Suppository 650 milliGRAM(s) Rectal every 6 hours PRN For Temp greater than 38 C (100.4 F)  QUEtiapine 12.5 milliGRAM(s) Oral every 8 hours PRN agitation      Allergies    aspirin (Unknown)    Intolerances        LABS:                        9.7    6.4   )-----------( 170      ( 01 Sep 2018 05:43 )             29.9     09-01    140  |  105  |  11  ----------------------------<  114<H>  3.7   |  23  |  1.18    Ca    8.4      01 Sep 2018 05:43  Phos  2.4     09-01  Mg     1.6     09-01      PT/INR - ( 01 Sep 2018 05:43 )   PT: 21.5 sec;   INR: 1.91          PTT - ( 01 Sep 2018 05:43 )  PTT:32.0 sec      RADIOLOGY & ADDITIONAL TESTS:  Reviewed INTERVAL HPI/OVERNIGHT EVENTS:  Patient was seen and examined at bedside. Pt pulled out IV overnight so IV was replaced. No complaints at this time. Patient denies: fever, chills, dizziness, weakness, HA, Changes in vision, CP, palpitations, SOB, N/V/D/C, dysuria, changes in bowel movements, LE edema. ROS otherwise negative.    VITAL SIGNS:  T(F): 98 (08-31-18 @ 21:01)  HR: 108 (09-01-18 @ 04:08)  BP: 121/59 (09-01-18 @ 04:08)  RR: 16 (09-01-18 @ 04:08)  SpO2: 100% (09-01-18 @ 04:08)  Wt(kg): --    PHYSICAL EXAM:    Constitutional: elderly male laying in bed in NAD   Respiratory: Good inspiratory effort, +rales, no wheezing or rhonchi, without accessory muscle use and no intercostal retractions  Cardiovascular: Irregular rhythm, normal S1S2, no M/R/G  Gastrointestinal: soft, NTND, no masses palpable, BS normal  : Good in situ draining yellow urine, rectal tube in place draining dark loose stool. Rectum with erythema and skin breakdown surrounding rectal tube  Extremities: Warm, well perfused, pulses equal bilateral upper and lower extremities, no edema; L upper arm with deformity 2/2 trauma; L leg continues to be painful to palpation and movement  Skin: multiple healing contusions on face and chin; chin entirely healed, cheeks with scabs; well-healing scab on L knee    MEDICATIONS  (STANDING):  BACItracin   Ointment 1 Application(s) Topical daily  Biotene Dry Mouth Oral Rinse 5 milliLiter(s) Swish and Spit daily  carbidopa/levodopa  25/100 1 Tablet(s) Oral three times a day  chlorhexidine 2% Cloths 1 Application(s) Topical daily  erythromycin   Ointment 1 Application(s) Right EYE daily  finasteride 5 milliGRAM(s) Oral daily  folic acid 1 milliGRAM(s) Oral daily  lactated ringers. 1000 milliLiter(s) (80 mL/Hr) IV Continuous <Continuous>  lactobacillus acidophilus 1 Tablet(s) Oral three times a day with meals  magnesium sulfate  IVPB 2 Gram(s) IV Intermittent once  metoprolol tartrate 50 milliGRAM(s) Oral every 12 hours  piperacillin/tazobactam IVPB. 3.375 Gram(s) IV Intermittent every 6 hours  potassium chloride   Powder 40 milliEquivalent(s) Oral once  QUEtiapine 12.5 milliGRAM(s) Oral at bedtime  tamsulosin 0.8 milliGRAM(s) Oral at bedtime  vitamin A &amp; D Ointment 1 Application(s) Topical daily    MEDICATIONS  (PRN):  acetaminophen  Suppository 650 milliGRAM(s) Rectal every 6 hours PRN For Temp greater than 38 C (100.4 F)  QUEtiapine 12.5 milliGRAM(s) Oral every 8 hours PRN agitation      Allergies    aspirin (Unknown)    Intolerances        LABS:                        9.7    6.4   )-----------( 170      ( 01 Sep 2018 05:43 )             29.9     09-01    140  |  105  |  11  ----------------------------<  114<H>  3.7   |  23  |  1.18    Ca    8.4      01 Sep 2018 05:43  Phos  2.4     09-01  Mg     1.6     09-01      PT/INR - ( 01 Sep 2018 05:43 )   PT: 21.5 sec;   INR: 1.91          PTT - ( 01 Sep 2018 05:43 )  PTT:32.0 sec      RADIOLOGY & ADDITIONAL TESTS:  Reviewed INTERVAL HPI/OVERNIGHT EVENTS:  Patient was seen and examined at bedside. Pt pulled out IV overnight so IV was replaced. No complaints at this time. Patient denies: fever, chills, dizziness, weakness, HA, Changes in vision, CP, palpitations, SOB, N/V. ROS otherwise negative.    VITAL SIGNS:  T(F): 98 (08-31-18 @ 21:01)  HR: 108 (09-01-18 @ 04:08)  BP: 121/59 (09-01-18 @ 04:08)  RR: 16 (09-01-18 @ 04:08)  SpO2: 100% (09-01-18 @ 04:08)  Wt(kg): --    PHYSICAL EXAM:    Constitutional: elderly male laying in bed in NAD   Respiratory: Good inspiratory effort, +rales, no wheezing or rhonchi, without accessory muscle use and no intercostal retractions  Cardiovascular: Irregular rhythm, normal S1S2, no M/R/G  Gastrointestinal: soft, NTND, no masses palpable, BS normal  : Good in situ draining yellow urine, rectal tube in place draining dark loose stool. Rectum with erythema and skin breakdown surrounding rectal tube  Extremities: Warm, well perfused, pulses equal bilateral upper and lower extremities, no edema; L upper arm with deformity 2/2 trauma; L leg continues to be painful to palpation and movement  Skin: multiple healing contusions on face and chin; chin entirely healed, cheeks with scabs; well-healing scab on L knee    MEDICATIONS  (STANDING):  BACItracin   Ointment 1 Application(s) Topical daily  Biotene Dry Mouth Oral Rinse 5 milliLiter(s) Swish and Spit daily  carbidopa/levodopa  25/100 1 Tablet(s) Oral three times a day  chlorhexidine 2% Cloths 1 Application(s) Topical daily  erythromycin   Ointment 1 Application(s) Right EYE daily  finasteride 5 milliGRAM(s) Oral daily  folic acid 1 milliGRAM(s) Oral daily  lactated ringers. 1000 milliLiter(s) (80 mL/Hr) IV Continuous <Continuous>  lactobacillus acidophilus 1 Tablet(s) Oral three times a day with meals  magnesium sulfate  IVPB 2 Gram(s) IV Intermittent once  metoprolol tartrate 50 milliGRAM(s) Oral every 12 hours  piperacillin/tazobactam IVPB. 3.375 Gram(s) IV Intermittent every 6 hours  potassium chloride   Powder 40 milliEquivalent(s) Oral once  QUEtiapine 12.5 milliGRAM(s) Oral at bedtime  tamsulosin 0.8 milliGRAM(s) Oral at bedtime  vitamin A &amp; D Ointment 1 Application(s) Topical daily    MEDICATIONS  (PRN):  acetaminophen  Suppository 650 milliGRAM(s) Rectal every 6 hours PRN For Temp greater than 38 C (100.4 F)  QUEtiapine 12.5 milliGRAM(s) Oral every 8 hours PRN agitation      Allergies    aspirin (Unknown)    Intolerances        LABS:                        9.7    6.4   )-----------( 170      ( 01 Sep 2018 05:43 )             29.9     09-01    140  |  105  |  11  ----------------------------<  114<H>  3.7   |  23  |  1.18    Ca    8.4      01 Sep 2018 05:43  Phos  2.4     09-01  Mg     1.6     09-01      PT/INR - ( 01 Sep 2018 05:43 )   PT: 21.5 sec;   INR: 1.91          PTT - ( 01 Sep 2018 05:43 )  PTT:32.0 sec      RADIOLOGY & ADDITIONAL TESTS:  Reviewed Hospital Course:  89 y/o M with atrial fibrillation (on Toprol XL and Coumadin), HTN, Parkinson's (on Sinemet), BPH, and ETOH abuse, initially BIBEMS to [facility] on 8/21 after being found down at home. Last seen well five days prior. Patient was altered on presentation and unable to provide history. At [facility] ED T 96.5,  in Afib, /91, RR18, O2 100%. Labs showed BUN/Cr significantly elevated from baseline. UA positive for LE, WBC, nitrites, and bacteria. Started on ceftriaxone and given 1L NS x 2. Started on bicarb drip and admitted to [facility] for toxic metabolic encephalopathy and sepsis 2/2 UTI.     Course was complicated by afib with RVR to 160s due to missed toprol doses, resolved with lopressor IV. On 8/23 patient was assessed by speech and swallow and recommended NPO. NGT was placed for medication administration and Sinemet was restarted. Toprol XL unable to be crushed, so patient was switched to lopressor 100mg BID with HR normalizing. Pt also found to be hypernatremic to 153. Pt was switched to D5 with resolution of hypernatremia. Pt was downgraded to RMF. During course on RMF, mental status continued to improve to baseline. Patient finished a seven-day course of ceftriaxone for UTI, although organisms were never isolated on blood cultures or urine cultures. NGT was removed. SW started planning for ALBIN.    On 8/28, was called to bedside for rigors. Patient was awake and answering questions, without any focal complaints. Exam notable for ill appearing, elderly frail man, tachycardic, CTA b/l, abdomen soft, NT/ND, extremities warm and well perfused. VS T 100.8 rectal, HR 130s, /70, O2 sat 80s on ambient air. Patient was placed on nasal cannula, then to NRB with improvement to mid 90s O2 saturation. EKG showed afib with RVR to the 130-140s. STAT labs and blood cultures were drawn and CXR was ordered STAT. Patient was given IV tylenol, cold packs, and sepsis bundle was initiated -- 500 cc of fluid was given. At that time, a rapid response was called. Attending, pulm fellow, and ICU consult team were all at bedside. Blood pressures were inconsistent, reading systolic 60s at one point on one arm, but as high as 190s systolic on the other. A manual BP confirmed systolic BP ranging from 120-140.     8/28-8/29. Pt admitted for ICU for observation. CXR showed NORMA PNA likely 2/2 aspiration. Abx coverage changed to zosyn. Pt started on fluids for BP support. BCx, UCx, and sputum Cx spent to lab. NGT was replaced. Pt was transferred to [facility]. On 7la, sputum positive for MRSA. Pt was started on vanco for MRSA and continued on Zosyn. He pulled out NGT. Speech and swallow evaluated the pt and recommended mechanical soft with nectar thick liquids w/ supervision. Leukocytosis has improved, pt no longer needing supplemental O2. Pt remains oriented only to self. Wife is his health care proxy and decided to make pt DNR/DNI. Palliative care is following.      INTERVAL HPI/OVERNIGHT EVENTS:  Patient was seen and examined at bedside. Pt pulled out IV overnight so IV was replaced. No complaints at this time. Patient denies: fever, chills, dizziness, weakness, HA, Changes in vision, CP, palpitations, SOB, N/V. ROS otherwise negative.    VITAL SIGNS:  T(F): 98 (08-31-18 @ 21:01)  HR: 108 (09-01-18 @ 04:08)  BP: 121/59 (09-01-18 @ 04:08)  RR: 16 (09-01-18 @ 04:08)  SpO2: 100% (09-01-18 @ 04:08)  Wt(kg): --    PHYSICAL EXAM:    Constitutional: elderly male laying in bed in NAD   Respiratory: Good inspiratory effort, +rales, no wheezing or rhonchi, without accessory muscle use and no intercostal retractions  Cardiovascular: Irregular rhythm, normal S1S2, no M/R/G  Gastrointestinal: soft, NTND, no masses palpable, BS normal  : Good in situ draining yellow urine, rectal tube in place draining dark loose stool. Rectum with erythema and skin breakdown surrounding rectal tube  Extremities: Warm, well perfused, pulses equal bilateral upper and lower extremities, no edema; L upper arm with deformity 2/2 trauma; L leg continues to be painful to palpation and movement  Skin: multiple healing contusions on face and chin; chin entirely healed, cheeks with scabs; well-healing scab on L knee    MEDICATIONS  (STANDING):  BACItracin   Ointment 1 Application(s) Topical daily  Biotene Dry Mouth Oral Rinse 5 milliLiter(s) Swish and Spit daily  carbidopa/levodopa  25/100 1 Tablet(s) Oral three times a day  chlorhexidine 2% Cloths 1 Application(s) Topical daily  erythromycin   Ointment 1 Application(s) Right EYE daily  finasteride 5 milliGRAM(s) Oral daily  folic acid 1 milliGRAM(s) Oral daily  lactated ringers. 1000 milliLiter(s) (80 mL/Hr) IV Continuous <Continuous>  lactobacillus acidophilus 1 Tablet(s) Oral three times a day with meals  magnesium sulfate  IVPB 2 Gram(s) IV Intermittent once  metoprolol tartrate 50 milliGRAM(s) Oral every 12 hours  piperacillin/tazobactam IVPB. 3.375 Gram(s) IV Intermittent every 6 hours  potassium chloride   Powder 40 milliEquivalent(s) Oral once  QUEtiapine 12.5 milliGRAM(s) Oral at bedtime  tamsulosin 0.8 milliGRAM(s) Oral at bedtime  vitamin A &amp; D Ointment 1 Application(s) Topical daily    MEDICATIONS  (PRN):  acetaminophen  Suppository 650 milliGRAM(s) Rectal every 6 hours PRN For Temp greater than 38 C (100.4 F)  QUEtiapine 12.5 milliGRAM(s) Oral every 8 hours PRN agitation      Allergies    aspirin (Unknown)    Intolerances        LABS:                        9.7    6.4   )-----------( 170      ( 01 Sep 2018 05:43 )             29.9     09-01    140  |  105  |  11  ----------------------------<  114<H>  3.7   |  23  |  1.18    Ca    8.4      01 Sep 2018 05:43  Phos  2.4     09-01  Mg     1.6     09-01      PT/INR - ( 01 Sep 2018 05:43 )   PT: 21.5 sec;   INR: 1.91          PTT - ( 01 Sep 2018 05:43 )  PTT:32.0 sec      RADIOLOGY & ADDITIONAL TESTS:  Reviewed

## 2018-09-01 NOTE — PROGRESS NOTE ADULT - PROBLEM SELECTOR PLAN 8
F: LR @ 80cc/hr  E: replete K<4, Mg<2  N: Mechanical soft with nectar thin liquids; needs supervision, elevate bed 90 degrees, monitor for signs of aspiration.    VTE Prophylaxis: Coumadin  C: DNR/DNI  D: RMF F: LR @ 80cc/hr  E: replete K<4, Mg<2  N: Mechanical soft with nectar thin liquids; needs supervision, elevate bed 90 degrees, monitor for signs of aspiration.    VTE Prophylaxis: Eliquis  C: DNR/DNI  D: [facility] F: Tolerating PO, no IVF  E: replete K<4, Mg<2  N: Mechanical soft with nectar thin liquids; needs supervision, elevate bed 90 degrees, monitor for signs of aspiration.    VTE Prophylaxis: Eliquis  C: DNR/DNI  D: [facility]

## 2018-09-01 NOTE — PROGRESS NOTE ADULT - PROBLEM SELECTOR PLAN 4
Pt has h/o of a-fib on home Coumadin and Toprol XL 200mg. Was difficult to rate control earlier this admission likely 2/2 missed doses of Toprol XL  - on lopressor for rate control. Pt has been tachycardic 90s-120s  - Continue lopressor  - f/u INRs; consider switching to eliquis when INR<2 Pt has h/o of a-fib on home Coumadin and Toprol XL 200mg. Was difficult to rate control earlier this admission likely 2/2 missed doses of Toprol XL  - on lopressor for rate control. Pt has been tachycardic 90s-120s  - Continue lopressor; increased to 50 BID 8/31  - INR <2 9/1. Started patient on Eliquis for further anticoagulation

## 2018-09-01 NOTE — PROGRESS NOTE ADULT - PROBLEM SELECTOR PLAN 1
8/27 pt found to have rigors and was ill appearing with tachycardia to 143, temp to 101.4, RR of 30 and oxygen saturation of 80% on RA. CXR showed new NORMA opacity consistent with aspiration PNA. Lactate 2.7 (trended down to 1.8), and leukocytosis to 11.3 with left shift. Pt was fluid resuscitated with 1L bolus NS and fluids continued. Zosyn was started for empiric coverage  - lactate improved  - sputum cx showed MRSA, 1st dose of vanc 8/30 PM  - leukocytosis improved further to 7.2 today, continue to trend  - c/w zosyn  - c/w vanco; monitor levels  - f/u blood cx  - pt also received 7 day course Ceftriaxone 1g q24h last dose on 27th for sepsis 2/2 UTI 8/27 pt found to have rigors and was ill appearing with tachycardia to 143, temp to 101.4, RR of 30 and oxygen saturation of 80% on RA. CXR showed new NORMA opacity consistent with aspiration PNA. Lactate 2.7 (trended down to 1.8), and leukocytosis to 11.3 with left shift. Pt was fluid resuscitated with 1L bolus NS and fluids continued. Zosyn was started for empiric coverage  - lactate improved  - sputum cx showed MRSA, 1st dose of vanc 8/30 PM  - leukocytosis improved further to 7.2 today, continue to trend  - c/w zosyn (last day of 7 day course 9/3)  - c/w vanco; monitor levels. 5 day course with goal 15-20  - f/u blood cx; NGTD  - pt also received 7 day course Ceftriaxone 1g q24h last dose on 27th for sepsis 2/2 UTI

## 2018-09-01 NOTE — PROGRESS NOTE ADULT - PROBLEM SELECTOR PLAN 6
- c/w home Sinemet    #BPH  - continue home tamsulosin 0.4 mg daily  - continue home finasteride - c/w home Sinemet    #BPH  - continue home tamsulosin 0.4 mg daily  - continue home finasteride  - TOV today

## 2018-09-01 NOTE — PROGRESS NOTE ADULT - PROBLEM SELECTOR PLAN 3
- pt originally presented with UTI in AMS  - currently A&Ox1 to person only  - AMS likely 2/2 sepsis vs. delirium, will cont to monitor and treat underlying disease    #delirium- pt continues to be oriented only to self with agitation. Likely 2/2 delirium in the setting of sepsis/hospitalization and possible underlying dementia from Parkinson's  - as per psychiatry, pt received 1 dose of seroquel 8/30 at bedtime with positive effect  - c/w seroquel 12.5 qhs  - continue to orient patient  - continue to monitor - pt originally presented with UTI in AMS  - currently A&Ox1 to person only  - AMS likely 2/2 sepsis vs. delirium, will cont to monitor and treat underlying disease    #delirium- pt continues to be oriented only to self with agitation. Likely 2/2 delirium in the setting of sepsis/hospitalization and possible underlying dementia from Parkinson's/long term alcoholism  - as per psychiatry, seroquel 12.5 qhs and q8hrs PRN for agitation  - continue to orient patient  - continue to monitor

## 2018-09-02 LAB
ANION GAP SERPL CALC-SCNC: 14 MMOL/L — SIGNIFICANT CHANGE UP (ref 5–17)
BUN SERPL-MCNC: 10 MG/DL — SIGNIFICANT CHANGE UP (ref 7–23)
CALCIUM SERPL-MCNC: 8.7 MG/DL — SIGNIFICANT CHANGE UP (ref 8.4–10.5)
CHLORIDE SERPL-SCNC: 103 MMOL/L — SIGNIFICANT CHANGE UP (ref 96–108)
CO2 SERPL-SCNC: 20 MMOL/L — LOW (ref 22–31)
CREAT SERPL-MCNC: 1.13 MG/DL — SIGNIFICANT CHANGE UP (ref 0.5–1.3)
CULTURE RESULTS: SIGNIFICANT CHANGE UP
CULTURE RESULTS: SIGNIFICANT CHANGE UP
GLUCOSE SERPL-MCNC: 112 MG/DL — HIGH (ref 70–99)
HCT VFR BLD CALC: 31.2 % — LOW (ref 39–50)
HGB BLD-MCNC: 10.3 G/DL — LOW (ref 13–17)
MAGNESIUM SERPL-MCNC: 1.8 MG/DL — SIGNIFICANT CHANGE UP (ref 1.6–2.6)
MCHC RBC-ENTMCNC: 31.7 PG — SIGNIFICANT CHANGE UP (ref 27–34)
MCHC RBC-ENTMCNC: 33 G/DL — SIGNIFICANT CHANGE UP (ref 32–36)
MCV RBC AUTO: 96 FL — SIGNIFICANT CHANGE UP (ref 80–100)
PHOSPHATE SERPL-MCNC: 2.3 MG/DL — LOW (ref 2.5–4.5)
PLATELET # BLD AUTO: 206 K/UL — SIGNIFICANT CHANGE UP (ref 150–400)
POTASSIUM SERPL-MCNC: 3.6 MMOL/L — SIGNIFICANT CHANGE UP (ref 3.5–5.3)
POTASSIUM SERPL-SCNC: 3.6 MMOL/L — SIGNIFICANT CHANGE UP (ref 3.5–5.3)
RBC # BLD: 3.25 M/UL — LOW (ref 4.2–5.8)
RBC # FLD: 13.3 % — SIGNIFICANT CHANGE UP (ref 10.3–16.9)
SODIUM SERPL-SCNC: 137 MMOL/L — SIGNIFICANT CHANGE UP (ref 135–145)
SPECIMEN SOURCE: SIGNIFICANT CHANGE UP
SPECIMEN SOURCE: SIGNIFICANT CHANGE UP
VANCOMYCIN FLD-MCNC: 15.7 UG/ML — SIGNIFICANT CHANGE UP
WBC # BLD: 7.9 K/UL — SIGNIFICANT CHANGE UP (ref 3.8–10.5)
WBC # FLD AUTO: 7.9 K/UL — SIGNIFICANT CHANGE UP (ref 3.8–10.5)

## 2018-09-02 PROCEDURE — 99233 SBSQ HOSP IP/OBS HIGH 50: CPT | Mod: GC

## 2018-09-02 PROCEDURE — 71045 X-RAY EXAM CHEST 1 VIEW: CPT | Mod: 26

## 2018-09-02 RX ORDER — VANCOMYCIN HCL 1 G
1500 VIAL (EA) INTRAVENOUS ONCE
Qty: 0 | Refills: 0 | Status: COMPLETED | OUTPATIENT
Start: 2018-09-02 | End: 2018-09-02

## 2018-09-02 RX ORDER — LABETALOL HCL 100 MG
10 TABLET ORAL ONCE
Qty: 0 | Refills: 0 | Status: COMPLETED | OUTPATIENT
Start: 2018-09-02 | End: 2018-09-02

## 2018-09-02 RX ORDER — QUETIAPINE FUMARATE 200 MG/1
12.5 TABLET, FILM COATED ORAL AT BEDTIME
Qty: 0 | Refills: 0 | Status: DISCONTINUED | OUTPATIENT
Start: 2018-09-02 | End: 2018-09-07

## 2018-09-02 RX ORDER — QUETIAPINE FUMARATE 200 MG/1
12.5 TABLET, FILM COATED ORAL
Qty: 0 | Refills: 0 | Status: DISCONTINUED | OUTPATIENT
Start: 2018-09-02 | End: 2018-09-02

## 2018-09-02 RX ADMIN — Medication 1 APPLICATION(S): at 11:37

## 2018-09-02 RX ADMIN — CHLORHEXIDINE GLUCONATE 1 APPLICATION(S): 213 SOLUTION TOPICAL at 11:38

## 2018-09-02 RX ADMIN — Medication 10 MILLIGRAM(S): at 20:50

## 2018-09-02 RX ADMIN — CARBIDOPA AND LEVODOPA 1 TABLET(S): 25; 100 TABLET ORAL at 06:49

## 2018-09-02 RX ADMIN — CARBIDOPA AND LEVODOPA 1 TABLET(S): 25; 100 TABLET ORAL at 22:25

## 2018-09-02 RX ADMIN — APIXABAN 5 MILLIGRAM(S): 2.5 TABLET, FILM COATED ORAL at 06:49

## 2018-09-02 RX ADMIN — Medication 1 MILLIGRAM(S): at 11:38

## 2018-09-02 RX ADMIN — Medication 5 MILLILITER(S): at 11:41

## 2018-09-02 RX ADMIN — PIPERACILLIN AND TAZOBACTAM 200 GRAM(S): 4; .5 INJECTION, POWDER, LYOPHILIZED, FOR SOLUTION INTRAVENOUS at 11:38

## 2018-09-02 RX ADMIN — CARBIDOPA AND LEVODOPA 1 TABLET(S): 25; 100 TABLET ORAL at 14:05

## 2018-09-02 RX ADMIN — PIPERACILLIN AND TAZOBACTAM 200 GRAM(S): 4; .5 INJECTION, POWDER, LYOPHILIZED, FOR SOLUTION INTRAVENOUS at 00:20

## 2018-09-02 RX ADMIN — Medication 75 MILLIGRAM(S): at 17:26

## 2018-09-02 RX ADMIN — APIXABAN 5 MILLIGRAM(S): 2.5 TABLET, FILM COATED ORAL at 17:28

## 2018-09-02 RX ADMIN — Medication 1 TABLET(S): at 17:26

## 2018-09-02 RX ADMIN — PIPERACILLIN AND TAZOBACTAM 200 GRAM(S): 4; .5 INJECTION, POWDER, LYOPHILIZED, FOR SOLUTION INTRAVENOUS at 06:49

## 2018-09-02 RX ADMIN — Medication 1 TABLET(S): at 11:38

## 2018-09-02 RX ADMIN — Medication 75 MILLIGRAM(S): at 06:49

## 2018-09-02 RX ADMIN — Medication 300 MILLIGRAM(S): at 22:25

## 2018-09-02 RX ADMIN — TAMSULOSIN HYDROCHLORIDE 0.8 MILLIGRAM(S): 0.4 CAPSULE ORAL at 22:25

## 2018-09-02 RX ADMIN — FINASTERIDE 5 MILLIGRAM(S): 5 TABLET, FILM COATED ORAL at 11:37

## 2018-09-02 RX ADMIN — Medication 1 APPLICATION(S): at 11:41

## 2018-09-02 RX ADMIN — QUETIAPINE FUMARATE 12.5 MILLIGRAM(S): 200 TABLET, FILM COATED ORAL at 22:25

## 2018-09-02 RX ADMIN — PIPERACILLIN AND TAZOBACTAM 200 GRAM(S): 4; .5 INJECTION, POWDER, LYOPHILIZED, FOR SOLUTION INTRAVENOUS at 17:26

## 2018-09-02 NOTE — PROGRESS NOTE ADULT - PROBLEM SELECTOR PLAN 8
F: Tolerating PO, no IVF  E: replete K<4, Mg<2  N: Mechanical soft with nectar thin liquids; needs supervision, elevate bed 90 degrees, monitor for signs of aspiration.    VTE Prophylaxis: Eliquis  C: DNR/DNI  D: [facility]

## 2018-09-02 NOTE — PROGRESS NOTE ADULT - ATTENDING COMMENTS
Wound care to see for rectal excoriations and left heal ulcer. Continue seroquel q hs only and avoid oversedation.

## 2018-09-02 NOTE — PROGRESS NOTE ADULT - PROBLEM SELECTOR PLAN 2
Pt has sputum culture positive for MRSA.  - c/w vanco; f/u vanc levels and redose accordingly (needs 5 day course of therapeutic levels 15-20)  - c/w zosyn (last day 9/3 for 7 day course)  - f/u blood cx  - continue to trend CBC    # Hypoxic hypercapnic respiratory failure likely 2/2 PNA  - Pt found to have PNA w/ increased respiratory needs. ABG showed met acidosis with resp alkalosis  - pt now on 2L NC; will titrate to RA today if tolerated  - will cont with oxygen supplementation and pulmonary toilet supple/no JVD

## 2018-09-02 NOTE — PROGRESS NOTE ADULT - PROBLEM SELECTOR PLAN 7
Pt has hx of alcohol abuse. Last drink prior to admission. Alcohol level 0 on admission. CIWA consistently 0. Pt received >1week of thiamine   - c/w Folate    # Thrombocytopenia  - likely in setting of etoh abuse  - continue to trend CBC    # Anemia   - stable  - continue to trend CBC

## 2018-09-02 NOTE — PROGRESS NOTE ADULT - SUBJECTIVE AND OBJECTIVE BOX
INTERVAL HPI/OVERNIGHT EVENTS:  Patient was seen and examined at bedside.    No o/n events, patient resting comfortably. No complaints at this time. Patient denies: fever, chills, dizziness, weakness, HA, Changes in vision, CP, palpitations, SOB, cough, N/V/D/C, dysuria, changes in bowel movements, LE edema. ROS otherwise negative.        VITAL SIGNS:  T(F): 99.1 (09-02-18 @ 05:43)  HR: 93 (09-02-18 @ 06:41)  BP: 153/80 (09-02-18 @ 06:41)  RR: 16 (09-02-18 @ 06:41)  SpO2: 100% (09-02-18 @ 06:41)  Wt(kg): --    PHYSICAL EXAM:    Constitutional: elderly male laying in bed in NAD   Respiratory: Good inspiratory effort, +rales, no wheezing or rhonchi, without accessory muscle use and no intercostal retractions  Cardiovascular: Irregular rhythm, normal S1S2, no M/R/G  Gastrointestinal: soft, NTND, no masses palpable, BS normal  : Good in situ draining yellow urine, rectal tube in place draining dark loose stool. Rectum with erythema and skin breakdown surrounding rectal tube  Extremities: Warm, well perfused, pulses equal bilateral upper and lower extremities, no edema; L upper arm with deformity 2/2 trauma; L leg continues to be painful to palpation and movement  Skin: multiple healing contusions on face and chin; chin entirely healed, cheeks with scabs; well-healing scab on L knee        MEDICATIONS  (STANDING):  apixaban 5 milliGRAM(s) Oral every 12 hours  BACItracin   Ointment 1 Application(s) Topical daily  Biotene Dry Mouth Oral Rinse 5 milliLiter(s) Swish and Spit daily  carbidopa/levodopa  25/100 1 Tablet(s) Oral three times a day  chlorhexidine 2% Cloths 1 Application(s) Topical daily  erythromycin   Ointment 1 Application(s) Right EYE daily  finasteride 5 milliGRAM(s) Oral daily  folic acid 1 milliGRAM(s) Oral daily  lactobacillus acidophilus 1 Tablet(s) Oral three times a day with meals  metoprolol tartrate 75 milliGRAM(s) Oral two times a day  piperacillin/tazobactam IVPB. 3.375 Gram(s) IV Intermittent every 6 hours  QUEtiapine 12.5 milliGRAM(s) Oral at bedtime  tamsulosin 0.8 milliGRAM(s) Oral at bedtime  vitamin A &amp; D Ointment 1 Application(s) Topical daily    MEDICATIONS  (PRN):  acetaminophen  Suppository 650 milliGRAM(s) Rectal every 6 hours PRN For Temp greater than 38 C (100.4 F)  QUEtiapine 12.5 milliGRAM(s) Oral every 8 hours PRN agitation      Allergies    aspirin (Unknown)    Intolerances        LABS:                        10.3   7.9   )-----------( 206      ( 02 Sep 2018 05:41 )             31.2     09-02    137  |  103  |  10  ----------------------------<  112<H>  3.6   |  20<L>  |  1.13    Ca    8.7      02 Sep 2018 05:41  Phos  2.3     09-02  Mg     1.8     09-02      PT/INR - ( 01 Sep 2018 05:43 )   PT: 21.5 sec;   INR: 1.91          PTT - ( 01 Sep 2018 05:43 )  PTT:32.0 sec      RADIOLOGY & ADDITIONAL TESTS:  Reviewed INTERVAL HPI/OVERNIGHT EVENTS:  Patient was seen and examined at bedside. Overnight, pt metoprolol increased to 75BID from 50BID as pt was persistent HR in the 110- 120s. Bladder scan showed 420cc, given straight cath.  Awaiting repeat bladder scan at 10am this morning. Will straight cath if residual 420cc.      VITAL SIGNS:  T(F): 99.1 (09-02-18 @ 05:43)  HR: 93 (09-02-18 @ 06:41)  BP: 153/80 (09-02-18 @ 06:41)  RR: 16 (09-02-18 @ 06:41)  SpO2: 100% (09-02-18 @ 06:41)  Wt(kg): --    PHYSICAL EXAM:    Constitutional: elderly male laying in bed in NAD   Respiratory: Good inspiratory effort, +rales, no wheezing or rhonchi, without accessory muscle use and no intercostal retractions  Cardiovascular: Irregular rhythm, normal S1S2, no M/R/G  Gastrointestinal: soft, NTND, no masses palpable, BS normal  : Good in situ draining yellow urine, rectal tube in place draining dark loose watery stool. Rectum with erythema and skin breakdown surrounding rectal tube  Extremities: Warm, well perfused, pulses equal bilateral upper and lower extremities, no edema; L upper arm with deformity 2/2 trauma; L leg continues to be painful to palpation and movement  Skin: multiple healing contusions on face and chin; chin entirely healed, cheeks with scabs; well-healing scab on L knee        MEDICATIONS  (STANDING):  apixaban 5 milliGRAM(s) Oral every 12 hours  BACItracin   Ointment 1 Application(s) Topical daily  Biotene Dry Mouth Oral Rinse 5 milliLiter(s) Swish and Spit daily  carbidopa/levodopa  25/100 1 Tablet(s) Oral three times a day  chlorhexidine 2% Cloths 1 Application(s) Topical daily  erythromycin   Ointment 1 Application(s) Right EYE daily  finasteride 5 milliGRAM(s) Oral daily  folic acid 1 milliGRAM(s) Oral daily  lactobacillus acidophilus 1 Tablet(s) Oral three times a day with meals  metoprolol tartrate 75 milliGRAM(s) Oral two times a day  piperacillin/tazobactam IVPB. 3.375 Gram(s) IV Intermittent every 6 hours  QUEtiapine 12.5 milliGRAM(s) Oral at bedtime  tamsulosin 0.8 milliGRAM(s) Oral at bedtime  vitamin A &amp; D Ointment 1 Application(s) Topical daily    MEDICATIONS  (PRN):  acetaminophen  Suppository 650 milliGRAM(s) Rectal every 6 hours PRN For Temp greater than 38 C (100.4 F)  QUEtiapine 12.5 milliGRAM(s) Oral every 8 hours PRN agitation      Allergies    aspirin (Unknown)    Intolerances        LABS:                        10.3   7.9   )-----------( 206      ( 02 Sep 2018 05:41 )             31.2     09-02    137  |  103  |  10  ----------------------------<  112<H>  3.6   |  20<L>  |  1.13    Ca    8.7      02 Sep 2018 05:41  Phos  2.3     09-02  Mg     1.8     09-02      PT/INR - ( 01 Sep 2018 05:43 )   PT: 21.5 sec;   INR: 1.91          PTT - ( 01 Sep 2018 05:43 )  PTT:32.0 sec      RADIOLOGY & ADDITIONAL TESTS:  Reviewed INTERVAL HPI/OVERNIGHT EVENTS:  Patient was seen and examined at bedside. Overnight, pt metoprolol increased to 75BID from 50BID as pt was persistent HR in the 110- 120s. Bladder scan showed 420cc, given straight cath.  Awaiting repeat bladder scan at 10am this morning. Will straight cath if residual is greater than 400cc. Also found to have a new left ankle pressure ulcer      VITAL SIGNS:  T(F): 99.1 (09-02-18 @ 05:43)  HR: 93 (09-02-18 @ 06:41)  BP: 153/80 (09-02-18 @ 06:41)  RR: 16 (09-02-18 @ 06:41)  SpO2: 100% (09-02-18 @ 06:41)  Wt(kg): --    PHYSICAL EXAM:    Constitutional: elderly male laying in bed in NAD   Respiratory: Good inspiratory effort, +rales, no wheezing or rhonchi, without accessory muscle use and no intercostal retractions  Cardiovascular: Irregular rhythm, normal S1S2, no M/R/G  Gastrointestinal: soft, NTND, no masses palpable, BS normal  : Good in situ draining yellow urine, rectal tube in place draining dark loose watery stool. Rectum with erythema and skin breakdown surrounding rectal tube  Extremities: Warm, well perfused, pulses equal bilateral upper and lower extremities, no edema; L upper arm with deformity 2/2 trauma; L leg continues to be painful to palpation and movement, + left ankle pressure ulcer  Skin: multiple healing contusions on face and chin; chin entirely healed, cheeks with scabs; well-healing scab on L knee        MEDICATIONS  (STANDING):  apixaban 5 milliGRAM(s) Oral every 12 hours  BACItracin   Ointment 1 Application(s) Topical daily  Biotene Dry Mouth Oral Rinse 5 milliLiter(s) Swish and Spit daily  carbidopa/levodopa  25/100 1 Tablet(s) Oral three times a day  chlorhexidine 2% Cloths 1 Application(s) Topical daily  erythromycin   Ointment 1 Application(s) Right EYE daily  finasteride 5 milliGRAM(s) Oral daily  folic acid 1 milliGRAM(s) Oral daily  lactobacillus acidophilus 1 Tablet(s) Oral three times a day with meals  metoprolol tartrate 75 milliGRAM(s) Oral two times a day  piperacillin/tazobactam IVPB. 3.375 Gram(s) IV Intermittent every 6 hours  QUEtiapine 12.5 milliGRAM(s) Oral at bedtime  tamsulosin 0.8 milliGRAM(s) Oral at bedtime  vitamin A &amp; D Ointment 1 Application(s) Topical daily    MEDICATIONS  (PRN):  acetaminophen  Suppository 650 milliGRAM(s) Rectal every 6 hours PRN For Temp greater than 38 C (100.4 F)  QUEtiapine 12.5 milliGRAM(s) Oral every 8 hours PRN agitation      Allergies    aspirin (Unknown)    Intolerances        LABS:                        10.3   7.9   )-----------( 206      ( 02 Sep 2018 05:41 )             31.2     09-02    137  |  103  |  10  ----------------------------<  112<H>  3.6   |  20<L>  |  1.13    Ca    8.7      02 Sep 2018 05:41  Phos  2.3     09-02  Mg     1.8     09-02      PT/INR - ( 01 Sep 2018 05:43 )   PT: 21.5 sec;   INR: 1.91          PTT - ( 01 Sep 2018 05:43 )  PTT:32.0 sec      RADIOLOGY & ADDITIONAL TESTS:  Reviewed

## 2018-09-02 NOTE — PROGRESS NOTE ADULT - PROBLEM SELECTOR PLAN 6
- c/w home Sinemet    #BPH  - continue home tamsulosin 0.4 mg daily  - continue home finasteride  - TOV today

## 2018-09-02 NOTE — PROVIDER CONTACT NOTE (OTHER) - SITUATION
MD notified, pt's cardiac monitor- HR was between 140-150s. MD called to bedside, MD did manual radial pulse HR was 92.   HR Not corresponding to the monitor's HR.   MD aware no IV access.

## 2018-09-02 NOTE — PROGRESS NOTE ADULT - PROBLEM SELECTOR PLAN 1
8/27 pt found to have rigors and was ill appearing with tachycardia to 143, temp to 101.4, RR of 30 and oxygen saturation of 80% on RA. CXR showed new NORMA opacity consistent with aspiration PNA. Lactate 2.7 (trended down to 1.8), and leukocytosis to 11.3 with left shift. Pt was fluid resuscitated with 1L bolus NS and fluids continued. Zosyn was started for empiric coverage  - lactate improved  - sputum cx showed MRSA, 1st dose of vanc 8/30 PM  - leukocytosis improved further to 7.2 today, continue to trend  - c/w zosyn (last day of 7 day course 9/3)  - c/w vanco; monitor levels. 5 day course with goal 15-20  - f/u blood cx; NGTD  - pt also received 7 day course Ceftriaxone 1g q24h last dose on 27th for sepsis 2/2 UTI 8/27 pt found to have rigors and was ill appearing with tachycardia to 143, temp to 101.4, RR of 30 and oxygen saturation of 80% on RA. CXR showed new NORMA opacity consistent with aspiration PNA. Lactate 2.7 (trended down to 1.8), and leukocytosis to 11.3 with left shift. Pt was fluid resuscitated with 1L bolus NS and fluids continued. Zosyn was started for empiric coverage  - lactate improved  - sputum cx showed MRSA, 1st dose of vanc (8/31)  - leukocytosis 7.9 today, stabilizing, continue to trend  - c/w zosyn (last day of 7 day course 9/3)  - c/w vanco; monitor levels. 5 day course with goal 15-20  - f/u blood cx; NGTD  - pt also received 7 day course Ceftriaxone 1g q24h last dose on 27th for sepsis 2/2 UTI

## 2018-09-02 NOTE — PROGRESS NOTE ADULT - PROBLEM SELECTOR PLAN 5
In the setting of dehydration and obstruction from BPH, underlying CKD 3  - Cr today 1.18 (improving)  - trend BMP's  - dc'ing fluids as Cr improved and pt now tolerating PO intake  - f/u vanc level and dose according to level to avoid further kidney injury    #diarrhea- pt w diarrhea. rectal tube in place. cdiff ruled out. GI PCR neg  - c/w probiotics  - started pt on loperamide today  - continue to monitor In the setting of dehydration and obstruction from BPH, underlying CKD 3  - Cr today 1.183 (improving)  - trend BMP's  - dc'ing fluids as Cr improved and pt now tolerating PO intake  - f/u vanc level and dose according to level to avoid further kidney injury    #diarrhea- pt w diarrhea. rectal tube in place. cdiff ruled out. GI PCR neg  - c/w probiotics  - started pt on loperamide today  - continue to monitor

## 2018-09-02 NOTE — PROGRESS NOTE ADULT - PROBLEM SELECTOR PLAN 4
Pt has h/o of a-fib on home Coumadin and Toprol XL 200mg. Was difficult to rate control earlier this admission likely 2/2 missed doses of Toprol XL  - on lopressor for rate control. Pt has been tachycardic 90s-120s  - Continue lopressor; increased to 50 BID 8/31  - INR <2 9/1. Started patient on Eliquis for further anticoagulation Pt has h/o of a-fib on home Coumadin and Toprol XL 200mg. Was difficult to rate control earlier this admission likely 2/2 missed doses of Toprol XL  - on lopressor for rate control. Pt has been tachycardic 90s-120s  - Continue lopressor; increased to 75 BID 8/31  - INR <2 9/1. c/w Eliquis for further anticoagulation

## 2018-09-02 NOTE — PROVIDER CONTACT NOTE (OTHER) - ASSESSMENT
Pt DTV at 0700. Bladder scan performed, detected 350mL. Pt straight cath twice within 24 hours. Pt does not sense the urge to void.

## 2018-09-02 NOTE — PROGRESS NOTE ADULT - PROBLEM SELECTOR PLAN 3
- pt originally presented with UTI in AMS  - currently A&Ox1 to person only  - AMS likely 2/2 sepsis vs. delirium, will cont to monitor and treat underlying disease    #delirium- pt continues to be oriented only to self with agitation. Likely 2/2 delirium in the setting of sepsis/hospitalization and possible underlying dementia from Parkinson's/long term alcoholism  - as per psychiatry, seroquel 12.5 qhs and q8hrs PRN for agitation  - continue to orient patient  - continue to monitor

## 2018-09-02 NOTE — PROVIDER CONTACT NOTE (OTHER) - ACTION/TREATMENT ORDERED:
MD Sargent aware pt with IAD and rectal tube which leaks.
hr to 159, when pt was being changed. MD made aware, currently 103. No interventions per MD.
MD aware.  No intervention at this time.  Will continue to monitor
MD coburn will put in IV using ultrasound.
No interventions at this time per MD
Per MD Sargent, ok to give medications orally as patient passed speech and swallow.
Per MD, ok to hold LR  temporarily, while zosyn runs in the morning. one IV left.
Inserted chan as ordered
REGINA Jamison made aware after multiple attempts to establish IV access with multiple staff nurses. Multiple request made to MD to establish iv access. No further interventions made at this time.
Spoke with DO Percy. Stated to give pt more time, stated to rescan bladder at 1000 if pt does not void. Will continue to monitor pt closely.

## 2018-09-03 LAB
ALBUMIN SERPL ELPH-MCNC: 3 G/DL — LOW (ref 3.3–5)
ALP SERPL-CCNC: 65 U/L — SIGNIFICANT CHANGE UP (ref 40–120)
ALT FLD-CCNC: <5 U/L — LOW (ref 10–45)
ANION GAP SERPL CALC-SCNC: 14 MMOL/L — SIGNIFICANT CHANGE UP (ref 5–17)
APPEARANCE UR: CLEAR — SIGNIFICANT CHANGE UP
AST SERPL-CCNC: 23 U/L — SIGNIFICANT CHANGE UP (ref 10–40)
BASOPHILS NFR BLD AUTO: 1 % — SIGNIFICANT CHANGE UP (ref 0–2)
BILIRUB SERPL-MCNC: 0.9 MG/DL — SIGNIFICANT CHANGE UP (ref 0.2–1.2)
BILIRUB UR-MCNC: NEGATIVE — SIGNIFICANT CHANGE UP
BUN SERPL-MCNC: 9 MG/DL — SIGNIFICANT CHANGE UP (ref 7–23)
CALCIUM SERPL-MCNC: 8.6 MG/DL — SIGNIFICANT CHANGE UP (ref 8.4–10.5)
CHLORIDE SERPL-SCNC: 99 MMOL/L — SIGNIFICANT CHANGE UP (ref 96–108)
CHLORIDE UR-SCNC: 110 MMOL/L — SIGNIFICANT CHANGE UP
CO2 SERPL-SCNC: 21 MMOL/L — LOW (ref 22–31)
COLOR SPEC: YELLOW — SIGNIFICANT CHANGE UP
CREAT ?TM UR-MCNC: 78 MG/DL — SIGNIFICANT CHANGE UP
CREAT SERPL-MCNC: 1.13 MG/DL — SIGNIFICANT CHANGE UP (ref 0.5–1.3)
DIFF PNL FLD: ABNORMAL
EOSINOPHIL NFR BLD AUTO: 4.9 % — SIGNIFICANT CHANGE UP (ref 0–6)
GLUCOSE SERPL-MCNC: 110 MG/DL — HIGH (ref 70–99)
GLUCOSE UR QL: NEGATIVE — SIGNIFICANT CHANGE UP
HCT VFR BLD CALC: 31.3 % — LOW (ref 39–50)
HGB BLD-MCNC: 10.1 G/DL — LOW (ref 13–17)
KETONES UR-MCNC: NEGATIVE — SIGNIFICANT CHANGE UP
LEUKOCYTE ESTERASE UR-ACNC: NEGATIVE — SIGNIFICANT CHANGE UP
LYMPHOCYTES # BLD AUTO: 9 % — LOW (ref 13–44)
MAGNESIUM SERPL-MCNC: 1.7 MG/DL — SIGNIFICANT CHANGE UP (ref 1.6–2.6)
MCHC RBC-ENTMCNC: 31.3 PG — SIGNIFICANT CHANGE UP (ref 27–34)
MCHC RBC-ENTMCNC: 32.3 G/DL — SIGNIFICANT CHANGE UP (ref 32–36)
MCV RBC AUTO: 96.9 FL — SIGNIFICANT CHANGE UP (ref 80–100)
MONOCYTES NFR BLD AUTO: 8.3 % — SIGNIFICANT CHANGE UP (ref 2–14)
NEUTROPHILS NFR BLD AUTO: 76.8 % — SIGNIFICANT CHANGE UP (ref 43–77)
NITRITE UR-MCNC: NEGATIVE — SIGNIFICANT CHANGE UP
OSMOLALITY UR: 576 MOSMOL/KG — SIGNIFICANT CHANGE UP (ref 100–650)
PH UR: 5.5 — SIGNIFICANT CHANGE UP (ref 5–8)
PHOSPHATE SERPL-MCNC: 2.7 MG/DL — SIGNIFICANT CHANGE UP (ref 2.5–4.5)
PLATELET # BLD AUTO: 213 K/UL — SIGNIFICANT CHANGE UP (ref 150–400)
POTASSIUM SERPL-MCNC: 3.8 MMOL/L — SIGNIFICANT CHANGE UP (ref 3.5–5.3)
POTASSIUM SERPL-SCNC: 3.8 MMOL/L — SIGNIFICANT CHANGE UP (ref 3.5–5.3)
PROT SERPL-MCNC: 6.3 G/DL — SIGNIFICANT CHANGE UP (ref 6–8.3)
PROT UR-MCNC: ABNORMAL MG/DL
RBC # BLD: 3.23 M/UL — LOW (ref 4.2–5.8)
RBC # FLD: 13.4 % — SIGNIFICANT CHANGE UP (ref 10.3–16.9)
SODIUM SERPL-SCNC: 134 MMOL/L — LOW (ref 135–145)
SODIUM UR-SCNC: 119 MMOL/L — SIGNIFICANT CHANGE UP
SP GR SPEC: 1.02 — SIGNIFICANT CHANGE UP (ref 1–1.03)
URATE SERPL-MCNC: 1.8 MG/DL — LOW (ref 3.4–8.8)
URATE UR-MCNC: 50.7 MG/DL — SIGNIFICANT CHANGE UP
UROBILINOGEN FLD QL: 0.2 E.U./DL — SIGNIFICANT CHANGE UP
VANCOMYCIN FLD-MCNC: 14.8 UG/ML — SIGNIFICANT CHANGE UP
WBC # BLD: 7.1 K/UL — SIGNIFICANT CHANGE UP (ref 3.8–10.5)
WBC # FLD AUTO: 7.1 K/UL — SIGNIFICANT CHANGE UP (ref 3.8–10.5)

## 2018-09-03 PROCEDURE — 99233 SBSQ HOSP IP/OBS HIGH 50: CPT | Mod: GC

## 2018-09-03 RX ORDER — MAGNESIUM SULFATE 500 MG/ML
2 VIAL (ML) INJECTION ONCE
Qty: 0 | Refills: 0 | Status: COMPLETED | OUTPATIENT
Start: 2018-09-03 | End: 2018-09-03

## 2018-09-03 RX ORDER — POTASSIUM CHLORIDE 20 MEQ
40 PACKET (EA) ORAL ONCE
Qty: 0 | Refills: 0 | Status: COMPLETED | OUTPATIENT
Start: 2018-09-03 | End: 2018-09-03

## 2018-09-03 RX ADMIN — TAMSULOSIN HYDROCHLORIDE 0.8 MILLIGRAM(S): 0.4 CAPSULE ORAL at 22:45

## 2018-09-03 RX ADMIN — FINASTERIDE 5 MILLIGRAM(S): 5 TABLET, FILM COATED ORAL at 11:42

## 2018-09-03 RX ADMIN — PIPERACILLIN AND TAZOBACTAM 200 GRAM(S): 4; .5 INJECTION, POWDER, LYOPHILIZED, FOR SOLUTION INTRAVENOUS at 17:02

## 2018-09-03 RX ADMIN — Medication 1 TABLET(S): at 08:45

## 2018-09-03 RX ADMIN — Medication 1 MILLIGRAM(S): at 11:42

## 2018-09-03 RX ADMIN — CHLORHEXIDINE GLUCONATE 1 APPLICATION(S): 213 SOLUTION TOPICAL at 11:43

## 2018-09-03 RX ADMIN — PIPERACILLIN AND TAZOBACTAM 200 GRAM(S): 4; .5 INJECTION, POWDER, LYOPHILIZED, FOR SOLUTION INTRAVENOUS at 06:31

## 2018-09-03 RX ADMIN — PIPERACILLIN AND TAZOBACTAM 200 GRAM(S): 4; .5 INJECTION, POWDER, LYOPHILIZED, FOR SOLUTION INTRAVENOUS at 11:43

## 2018-09-03 RX ADMIN — Medication 1 APPLICATION(S): at 11:42

## 2018-09-03 RX ADMIN — Medication 1 APPLICATION(S): at 11:43

## 2018-09-03 RX ADMIN — APIXABAN 5 MILLIGRAM(S): 2.5 TABLET, FILM COATED ORAL at 17:02

## 2018-09-03 RX ADMIN — CARBIDOPA AND LEVODOPA 1 TABLET(S): 25; 100 TABLET ORAL at 14:16

## 2018-09-03 RX ADMIN — CARBIDOPA AND LEVODOPA 1 TABLET(S): 25; 100 TABLET ORAL at 22:45

## 2018-09-03 RX ADMIN — Medication 50 GRAM(S): at 08:45

## 2018-09-03 RX ADMIN — APIXABAN 5 MILLIGRAM(S): 2.5 TABLET, FILM COATED ORAL at 06:32

## 2018-09-03 RX ADMIN — Medication 5 MILLILITER(S): at 11:43

## 2018-09-03 RX ADMIN — Medication 75 MILLIGRAM(S): at 06:32

## 2018-09-03 RX ADMIN — PIPERACILLIN AND TAZOBACTAM 200 GRAM(S): 4; .5 INJECTION, POWDER, LYOPHILIZED, FOR SOLUTION INTRAVENOUS at 00:04

## 2018-09-03 RX ADMIN — Medication 40 MILLIEQUIVALENT(S): at 08:45

## 2018-09-03 RX ADMIN — QUETIAPINE FUMARATE 12.5 MILLIGRAM(S): 200 TABLET, FILM COATED ORAL at 22:44

## 2018-09-03 RX ADMIN — Medication 1 TABLET(S): at 11:42

## 2018-09-03 RX ADMIN — Medication 75 MILLIGRAM(S): at 17:02

## 2018-09-03 RX ADMIN — CARBIDOPA AND LEVODOPA 1 TABLET(S): 25; 100 TABLET ORAL at 06:31

## 2018-09-03 RX ADMIN — Medication 1 TABLET(S): at 16:52

## 2018-09-03 NOTE — PROGRESS NOTE ADULT - PROBLEM SELECTOR PLAN 1
8/27 pt found to have rigors and was ill appearing with tachycardia to 143, temp to 101.4, RR of 30 and oxygen saturation of 80% on RA. CXR showed new NORMA opacity consistent with aspiration PNA. Lactate 2.7 (trended down to 1.8), and leukocytosis to 11.3 with left shift. Pt was fluid resuscitated with 1L bolus NS and fluids continued. Zosyn was started for empiric coverage  - lactate improved  - sputum cx showed MRSA, 1st dose of vanc (8/31)  - leukocytosis 7.9 today, stabilizing, continue to trend  - c/w zosyn (last day of 7 day course 9/3)  - c/w vanco; monitor levels. 5 day course with goal 15-20  - f/u blood cx; NGTD  - pt also received 7 day course Ceftriaxone 1g q24h last dose on 27th for sepsis 2/2 UTI 8/27 pt found to have rigors and was ill appearing with tachycardia to 143, temp to 101.4, RR of 30 and oxygen saturation of 80% on RA. CXR showed new NORMA opacity consistent with aspiration PNA. Lactate 2.7 (trended down to 1.8), and leukocytosis to 11.3 with left shift. Pt was fluid resuscitated with 1L bolus NS and fluids continued. Zosyn was started for empiric coverage. Febrile to 100.4 9/2.  - lactate improved  - sputum cx showed MRSA, 1st dose of vanc (8/31)  - leukocytosis 7.1 today, stabilizing, continue to trend  - c/w zosyn (last day of 7 day course 9/3)  - c/w vanco; monitor levels. 5 day course with goal 15-20  - f/u blood cx; NGTD  - pt also received 7 day course Ceftriaxone 1g q24h last dose on 27th for sepsis 2/2 UTI

## 2018-09-03 NOTE — PROGRESS NOTE ADULT - SUBJECTIVE AND OBJECTIVE BOX
INTERVAL HPI/OVERNIGHT EVENTS:  Patient was seen and examined at bedside. As per nurse and patient, no o/n events, patient resting comfortably. No complaints at this time. Patient denies: fever, chills, dizziness, weakness, HA, Changes in vision, CP, palpitations, SOB, cough, N/V/D/C, dysuria, changes in bowel movements, LE edema. ROS otherwise negative.    VITAL SIGNS:  T(F): 97.1 (18 @ 09:39)  HR: 96 (18 @ 08:22)  BP: 140/79 (18 @ 08:22)  RR: 18 (18 @ 08:22)  SpO2: 100% (18 @ 08:22)  Wt(kg): --    PHYSICAL EXAM:    Constitutional: WDWN, NAD  HEENT: PERRL, EOMI, sclera non-icteric, neck supple, trachea midline, no masses, no JVD, MMM, good dentition  Respiratory: CTA b/l, good air entry b/l, no wheezing, no rhonchi, no rales, without accessory muscle use and no intercostal retractions  Cardiovascular: RRR, normal S1S2, no M/R/G  Gastrointestinal: soft, NTND, no masses palpable, BS normal  Extremities: Warm, well perfused, pulses equal bilateral upper and lower extremities, no edema, no clubbing. Capillary refill <2 sec  Neurological: AAOx3, CN Grossly intact  Skin: Normal temperature, warm, dry    MEDICATIONS  (STANDING):  apixaban 5 milliGRAM(s) Oral every 12 hours  BACItracin   Ointment 1 Application(s) Topical daily  Biotene Dry Mouth Oral Rinse 5 milliLiter(s) Swish and Spit daily  carbidopa/levodopa  25/100 1 Tablet(s) Oral three times a day  chlorhexidine 2% Cloths 1 Application(s) Topical daily  erythromycin   Ointment 1 Application(s) Right EYE daily  finasteride 5 milliGRAM(s) Oral daily  folic acid 1 milliGRAM(s) Oral daily  lactobacillus acidophilus 1 Tablet(s) Oral three times a day with meals  metoprolol tartrate 75 milliGRAM(s) Oral two times a day  piperacillin/tazobactam IVPB. 3.375 Gram(s) IV Intermittent every 6 hours  QUEtiapine 12.5 milliGRAM(s) Oral at bedtime  tamsulosin 0.8 milliGRAM(s) Oral at bedtime  vitamin A &amp; D Ointment 1 Application(s) Topical daily    MEDICATIONS  (PRN):  acetaminophen  Suppository 650 milliGRAM(s) Rectal every 6 hours PRN For Temp greater than 38 C (100.4 F)      Allergies    aspirin (Unknown)    Intolerances        LABS:                        10.1   7.1   )-----------( 213      ( 03 Sep 2018 06:43 )             31.3     -    134<L>  |  99  |  9   ----------------------------<  110<H>  3.8   |  21<L>  |  1.13    Ca    8.6      03 Sep 2018 06:43  Phos  2.7       Mg     1.7         TPro  6.3  /  Alb  3.0<L>  /  TBili  0.9  /  DBili  x   /  AST  23  /  ALT  <5<L>  /  AlkPhos  65        Urinalysis Basic - ( 03 Sep 2018 07:20 )    Color: Yellow / Appearance: Clear / S.025 / pH: x  Gluc: x / Ketone: NEGATIVE  / Bili: Negative / Urobili: 0.2 E.U./dL   Blood: x / Protein: Trace mg/dL / Nitrite: NEGATIVE   Leuk Esterase: NEGATIVE / RBC: Many /HPF / WBC 5-10 /HPF   Sq Epi: x / Non Sq Epi: 0-5 /HPF / Bacteria: Present /HPF        RADIOLOGY & ADDITIONAL TESTS:  Reviewed INTERVAL HPI/OVERNIGHT EVENTS:  Patient was seen and examined at bedside. Overnight, he had one fever to 100.4. Blood cultures were drawn and CXR showed improvement from his previous CXRs. No complaints at this time.     VITAL SIGNS:  T(F): 97.1 (18 @ 09:39)  HR: 96 (18 @ 08:22)  BP: 140/79 (18 @ 08:22)  RR: 18 (18 @ 08:22)  SpO2: 100% (18 @ 08:22)  Wt(kg): --    PHYSICAL EXAM:    Constitutional: elderly male laying in bed in NAD   Respiratory: Good inspiratory effort, +rales, no wheezing or rhonchi, without accessory muscle use and no intercostal retractions  Cardiovascular: Irregular rhythm, normal S1S2, no M/R/G  Gastrointestinal: soft, NTND, no masses palpable, BS normal  : Good in situ draining yellow urine, rectal tube in place draining dark loose watery stool. Rectum with erythema and skin breakdown surrounding rectal tube  Extremities: Warm, well perfused, pulses equal bilateral upper and lower extremities, no edema; L upper arm with deformity 2/2 trauma; L leg continues to be painful to palpation and movement, + left ankle pressure ulcer  Skin: multiple healing contusions on face and chin; chin entirely healed, cheeks with scabs; well-healing scab on L knee    MEDICATIONS  (STANDING):  apixaban 5 milliGRAM(s) Oral every 12 hours  BACItracin   Ointment 1 Application(s) Topical daily  Biotene Dry Mouth Oral Rinse 5 milliLiter(s) Swish and Spit daily  carbidopa/levodopa  25/100 1 Tablet(s) Oral three times a day  chlorhexidine 2% Cloths 1 Application(s) Topical daily  erythromycin   Ointment 1 Application(s) Right EYE daily  finasteride 5 milliGRAM(s) Oral daily  folic acid 1 milliGRAM(s) Oral daily  lactobacillus acidophilus 1 Tablet(s) Oral three times a day with meals  metoprolol tartrate 75 milliGRAM(s) Oral two times a day  piperacillin/tazobactam IVPB. 3.375 Gram(s) IV Intermittent every 6 hours  QUEtiapine 12.5 milliGRAM(s) Oral at bedtime  tamsulosin 0.8 milliGRAM(s) Oral at bedtime  vitamin A &amp; D Ointment 1 Application(s) Topical daily    MEDICATIONS  (PRN):  acetaminophen  Suppository 650 milliGRAM(s) Rectal every 6 hours PRN For Temp greater than 38 C (100.4 F)      Allergies    aspirin (Unknown)    Intolerances        LABS:                        10.1   7.1   )-----------( 213      ( 03 Sep 2018 06:43 )             31.3     -    134<L>  |  99  |  9   ----------------------------<  110<H>  3.8   |  21<L>  |  1.13    Ca    8.6      03 Sep 2018 06:43  Phos  2.7       Mg     1.7         TPro  6.3  /  Alb  3.0<L>  /  TBili  0.9  /  DBili  x   /  AST  23  /  ALT  <5<L>  /  AlkPhos  65        Urinalysis Basic - ( 03 Sep 2018 07:20 )    Color: Yellow / Appearance: Clear / S.025 / pH: x  Gluc: x / Ketone: NEGATIVE  / Bili: Negative / Urobili: 0.2 E.U./dL   Blood: x / Protein: Trace mg/dL / Nitrite: NEGATIVE   Leuk Esterase: NEGATIVE / RBC: Many /HPF / WBC 5-10 /HPF   Sq Epi: x / Non Sq Epi: 0-5 /HPF / Bacteria: Present /HPF        RADIOLOGY & ADDITIONAL TESTS:  Reviewed INTERVAL HPI/OVERNIGHT EVENTS:  Patient was seen and examined at bedside. Overnight, he had one fever to 100.4. Blood cultures were drawn and CXR showed improvement from his previous CXRs. No complaints at this time.     VITAL SIGNS:  T(F): 97.1 (18 @ 09:39)  HR: 96 (18 @ 08:22)  BP: 140/79 (18 @ 08:22)  RR: 18 (18 @ 08:22)  SpO2: 100% (18 @ 08:22)  Wt(kg): --    PHYSICAL EXAM:    Constitutional: elderly male laying in bed in NAD   Respiratory: Decreased breath sounds at LLL base, no wheezing rales or rhonchi, without accessory muscle use and no intercostal retractions  Cardiovascular: Irregular rhythm, normal S1S2, no M/R/G  Gastrointestinal: soft, NTND, no masses palpable, BS normal  : Good in situ draining yellow urine, rectal tube in place draining dark loose watery stool. Rectum with erythema and skin breakdown surrounding rectal tube  Extremities: Warm, well perfused, pulses equal bilateral upper and lower extremities, no edema; L upper arm with deformity 2/2 trauma; L leg continues to be painful to palpation and movement, + left ankle pressure ulcer  Skin: multiple healing contusions on face and chin; chin entirely healed, cheeks with scabs; well-healing scab on L knee    MEDICATIONS  (STANDING):  apixaban 5 milliGRAM(s) Oral every 12 hours  BACItracin   Ointment 1 Application(s) Topical daily  Biotene Dry Mouth Oral Rinse 5 milliLiter(s) Swish and Spit daily  carbidopa/levodopa  25/100 1 Tablet(s) Oral three times a day  chlorhexidine 2% Cloths 1 Application(s) Topical daily  erythromycin   Ointment 1 Application(s) Right EYE daily  finasteride 5 milliGRAM(s) Oral daily  folic acid 1 milliGRAM(s) Oral daily  lactobacillus acidophilus 1 Tablet(s) Oral three times a day with meals  metoprolol tartrate 75 milliGRAM(s) Oral two times a day  piperacillin/tazobactam IVPB. 3.375 Gram(s) IV Intermittent every 6 hours  QUEtiapine 12.5 milliGRAM(s) Oral at bedtime  tamsulosin 0.8 milliGRAM(s) Oral at bedtime  vitamin A &amp; D Ointment 1 Application(s) Topical daily    MEDICATIONS  (PRN):  acetaminophen  Suppository 650 milliGRAM(s) Rectal every 6 hours PRN For Temp greater than 38 C (100.4 F)      Allergies    aspirin (Unknown)    Intolerances        LABS:                        10.1   7.1   )-----------( 213      ( 03 Sep 2018 06:43 )             31.3     -    134<L>  |  99  |  9   ----------------------------<  110<H>  3.8   |  21<L>  |  1.13    Ca    8.6      03 Sep 2018 06:43  Phos  2.7       Mg     1.7         TPro  6.3  /  Alb  3.0<L>  /  TBili  0.9  /  DBili  x   /  AST  23  /  ALT  <5<L>  /  AlkPhos  65        Urinalysis Basic - ( 03 Sep 2018 07:20 )    Color: Yellow / Appearance: Clear / S.025 / pH: x  Gluc: x / Ketone: NEGATIVE  / Bili: Negative / Urobili: 0.2 E.U./dL   Blood: x / Protein: Trace mg/dL / Nitrite: NEGATIVE   Leuk Esterase: NEGATIVE / RBC: Many /HPF / WBC 5-10 /HPF   Sq Epi: x / Non Sq Epi: 0-5 /HPF / Bacteria: Present /HPF        RADIOLOGY & ADDITIONAL TESTS:  Reviewed

## 2018-09-03 NOTE — PROGRESS NOTE ADULT - ATTENDING COMMENTS
LLL atelectais on CXr obtained overnight for low grade temp. O2 sat on RA 97 percent. working with PT, up and dangling. Decreased BS in LLL on chest exam. will repeat Xray in AM or sooner if decrease in O2 sat and continue aggressive pulm toilet overnight. secretions minimal.

## 2018-09-03 NOTE — PROGRESS NOTE ADULT - PROBLEM SELECTOR PLAN 2
Pt has sputum culture positive for MRSA.  - c/w vanco; f/u vanc levels and redose accordingly (needs 5 day course of therapeutic levels 15-20)  - c/w zosyn (last day 9/3 for 7 day course)  - f/u blood cx  - continue to trend CBC    # Hypoxic hypercapnic respiratory failure likely 2/2 PNA  - Pt found to have PNA w/ increased respiratory needs. ABG showed met acidosis with resp alkalosis  - pt now on 2L NC; will titrate to RA today if tolerated  - will cont with oxygen supplementation and pulmonary toilet

## 2018-09-03 NOTE — PROGRESS NOTE ADULT - PROBLEM SELECTOR PLAN 6
- c/w home Sinemet    #BPH  - continue home tamsulosin 0.4 mg daily  - continue home finasteride  - TOV today - c/w home Sinemet    #BPH  - continue home tamsulosin 0.4 mg daily  - continue home finasteride  - failed TOV 9/1. Good replaced

## 2018-09-03 NOTE — PROGRESS NOTE ADULT - ASSESSMENT
89 y/o M with hx of Parkinson disease, HTN, BPH, ETOH abuse, afib on Coumadin, found down at home for unknown period of time (up to 2 days) admitted for toxic metabolic encephalopathy and acute renal failure and found to have UTI s/p 7 day tx w/ ceftriaxone now with NORMA PNA.

## 2018-09-03 NOTE — PROGRESS NOTE ADULT - PROBLEM SELECTOR PLAN 4
Pt has h/o of a-fib on home Coumadin and Toprol XL 200mg. Was difficult to rate control earlier this admission likely 2/2 missed doses of Toprol XL  - on lopressor for rate control. Pt has been tachycardic 90s-120s  - Continue lopressor; increased to 75 BID 8/31  - INR <2 9/1. c/w Eliquis for further anticoagulation Pt has h/o of a-fib on home Coumadin and Toprol XL 200mg. Was difficult to rate control earlier this admission likely 2/2 missed doses of Toprol XL  - on lopressor for rate control. Pt has been tachycardic 90s-120s  - c/w lopressor 75 BID  - INR <2 9/1. c/w Eliquis for further anticoagulation  - if monitor says pt is tachycardic, palpate HR for 1 minute for more accurate rate    #Hyponatremia  - pt continues to have fluctuations in sodium. no longer on fluids. Sodium today 134. May be 2/2 SIADH vs. diarrhea vs. NILS  - f/u ulytes  - continue to monitor

## 2018-09-04 LAB
ANION GAP SERPL CALC-SCNC: 15 MMOL/L — SIGNIFICANT CHANGE UP (ref 5–17)
BUN SERPL-MCNC: 9 MG/DL — SIGNIFICANT CHANGE UP (ref 7–23)
CALCIUM SERPL-MCNC: 8.5 MG/DL — SIGNIFICANT CHANGE UP (ref 8.4–10.5)
CHLORIDE SERPL-SCNC: 102 MMOL/L — SIGNIFICANT CHANGE UP (ref 96–108)
CO2 SERPL-SCNC: 22 MMOL/L — SIGNIFICANT CHANGE UP (ref 22–31)
CREAT SERPL-MCNC: 1.21 MG/DL — SIGNIFICANT CHANGE UP (ref 0.5–1.3)
GLUCOSE SERPL-MCNC: 102 MG/DL — HIGH (ref 70–99)
HCT VFR BLD CALC: 30.2 % — LOW (ref 39–50)
HGB BLD-MCNC: 9.8 G/DL — LOW (ref 13–17)
MAGNESIUM SERPL-MCNC: 2 MG/DL — SIGNIFICANT CHANGE UP (ref 1.6–2.6)
MCHC RBC-ENTMCNC: 31.3 PG — SIGNIFICANT CHANGE UP (ref 27–34)
MCHC RBC-ENTMCNC: 32.5 G/DL — SIGNIFICANT CHANGE UP (ref 32–36)
MCV RBC AUTO: 96.5 FL — SIGNIFICANT CHANGE UP (ref 80–100)
PLATELET # BLD AUTO: 266 K/UL — SIGNIFICANT CHANGE UP (ref 150–400)
POTASSIUM SERPL-MCNC: 3.5 MMOL/L — SIGNIFICANT CHANGE UP (ref 3.5–5.3)
POTASSIUM SERPL-SCNC: 3.5 MMOL/L — SIGNIFICANT CHANGE UP (ref 3.5–5.3)
RBC # BLD: 3.13 M/UL — LOW (ref 4.2–5.8)
RBC # FLD: 13.5 % — SIGNIFICANT CHANGE UP (ref 10.3–16.9)
SODIUM SERPL-SCNC: 139 MMOL/L — SIGNIFICANT CHANGE UP (ref 135–145)
VANCOMYCIN FLD-MCNC: 15.6 UG/ML — SIGNIFICANT CHANGE UP
WBC # BLD: 6.4 K/UL — SIGNIFICANT CHANGE UP (ref 3.8–10.5)
WBC # FLD AUTO: 6.4 K/UL — SIGNIFICANT CHANGE UP (ref 3.8–10.5)

## 2018-09-04 PROCEDURE — 99231 SBSQ HOSP IP/OBS SF/LOW 25: CPT

## 2018-09-04 PROCEDURE — 93010 ELECTROCARDIOGRAM REPORT: CPT

## 2018-09-04 PROCEDURE — 71045 X-RAY EXAM CHEST 1 VIEW: CPT | Mod: 26

## 2018-09-04 PROCEDURE — 99233 SBSQ HOSP IP/OBS HIGH 50: CPT

## 2018-09-04 PROCEDURE — 99233 SBSQ HOSP IP/OBS HIGH 50: CPT | Mod: GC

## 2018-09-04 RX ORDER — SODIUM CHLORIDE 9 MG/ML
1000 INJECTION, SOLUTION INTRAVENOUS
Qty: 0 | Refills: 0 | Status: DISCONTINUED | OUTPATIENT
Start: 2018-09-04 | End: 2018-09-04

## 2018-09-04 RX ORDER — SODIUM CHLORIDE 9 MG/ML
250 INJECTION, SOLUTION INTRAVENOUS ONCE
Qty: 0 | Refills: 0 | Status: COMPLETED | OUTPATIENT
Start: 2018-09-04 | End: 2018-09-04

## 2018-09-04 RX ORDER — METOPROLOL TARTRATE 50 MG
5 TABLET ORAL ONCE
Qty: 0 | Refills: 0 | Status: COMPLETED | OUTPATIENT
Start: 2018-09-04 | End: 2018-09-04

## 2018-09-04 RX ORDER — VANCOMYCIN HCL 1 G
1500 VIAL (EA) INTRAVENOUS ONCE
Qty: 0 | Refills: 0 | Status: COMPLETED | OUTPATIENT
Start: 2018-09-04 | End: 2018-09-05

## 2018-09-04 RX ORDER — POTASSIUM CHLORIDE 20 MEQ
40 PACKET (EA) ORAL ONCE
Qty: 0 | Refills: 0 | Status: COMPLETED | OUTPATIENT
Start: 2018-09-04 | End: 2018-09-04

## 2018-09-04 RX ORDER — VANCOMYCIN HCL 1 G
1500 VIAL (EA) INTRAVENOUS ONCE
Qty: 0 | Refills: 0 | Status: COMPLETED | OUTPATIENT
Start: 2018-09-03 | End: 2018-09-04

## 2018-09-04 RX ADMIN — Medication 1 TABLET(S): at 08:37

## 2018-09-04 RX ADMIN — SODIUM CHLORIDE 75 MILLILITER(S): 9 INJECTION, SOLUTION INTRAVENOUS at 10:52

## 2018-09-04 RX ADMIN — Medication 1 MILLIGRAM(S): at 11:30

## 2018-09-04 RX ADMIN — Medication 300 MILLIGRAM(S): at 02:09

## 2018-09-04 RX ADMIN — QUETIAPINE FUMARATE 12.5 MILLIGRAM(S): 200 TABLET, FILM COATED ORAL at 21:14

## 2018-09-04 RX ADMIN — Medication 1 TABLET(S): at 11:30

## 2018-09-04 RX ADMIN — APIXABAN 5 MILLIGRAM(S): 2.5 TABLET, FILM COATED ORAL at 05:17

## 2018-09-04 RX ADMIN — Medication 75 MILLIGRAM(S): at 18:09

## 2018-09-04 RX ADMIN — APIXABAN 5 MILLIGRAM(S): 2.5 TABLET, FILM COATED ORAL at 18:11

## 2018-09-04 RX ADMIN — Medication 40 MILLIEQUIVALENT(S): at 11:33

## 2018-09-04 RX ADMIN — Medication 1 TABLET(S): at 18:10

## 2018-09-04 RX ADMIN — TAMSULOSIN HYDROCHLORIDE 0.8 MILLIGRAM(S): 0.4 CAPSULE ORAL at 21:14

## 2018-09-04 RX ADMIN — CHLORHEXIDINE GLUCONATE 1 APPLICATION(S): 213 SOLUTION TOPICAL at 11:30

## 2018-09-04 RX ADMIN — Medication 5 MILLILITER(S): at 11:31

## 2018-09-04 RX ADMIN — PIPERACILLIN AND TAZOBACTAM 200 GRAM(S): 4; .5 INJECTION, POWDER, LYOPHILIZED, FOR SOLUTION INTRAVENOUS at 05:16

## 2018-09-04 RX ADMIN — Medication 5 MILLIGRAM(S): at 18:40

## 2018-09-04 RX ADMIN — Medication 1 APPLICATION(S): at 11:30

## 2018-09-04 RX ADMIN — Medication 1 APPLICATION(S): at 14:44

## 2018-09-04 RX ADMIN — Medication 1 APPLICATION(S): at 11:31

## 2018-09-04 RX ADMIN — CARBIDOPA AND LEVODOPA 1 TABLET(S): 25; 100 TABLET ORAL at 05:16

## 2018-09-04 RX ADMIN — CARBIDOPA AND LEVODOPA 1 TABLET(S): 25; 100 TABLET ORAL at 16:17

## 2018-09-04 RX ADMIN — Medication 75 MILLIGRAM(S): at 05:16

## 2018-09-04 RX ADMIN — FINASTERIDE 5 MILLIGRAM(S): 5 TABLET, FILM COATED ORAL at 11:30

## 2018-09-04 RX ADMIN — CARBIDOPA AND LEVODOPA 1 TABLET(S): 25; 100 TABLET ORAL at 21:15

## 2018-09-04 RX ADMIN — PIPERACILLIN AND TAZOBACTAM 200 GRAM(S): 4; .5 INJECTION, POWDER, LYOPHILIZED, FOR SOLUTION INTRAVENOUS at 00:07

## 2018-09-04 RX ADMIN — SODIUM CHLORIDE 500 MILLILITER(S): 9 INJECTION, SOLUTION INTRAVENOUS at 16:55

## 2018-09-04 NOTE — PROGRESS NOTE ADULT - SUBJECTIVE AND OBJECTIVE BOX
SADIA LUTZ   MRN-[id number]     ([id number]):   CC: persistent delirium   HPI:  89M with Parkinson's (on Sinemet), Afib (Toprol XL and Coumadin), HTN, BPH and ETOH abuse who was BIBEMS after being found down at home. Wife was out of town caring for another sick family member.  He was alone and probably fell 24-48 hour earlier.  On admission was in renal failure and found to have a UTI.  Toxic screen was negative for drugs or alcohol.    Subjective:  Patient seen twice today- first at 11 am and again at 3PM.  Patient's delirium persists  This am he was oriented only to person, but could carry on a conversation.  He was put in an upright position by putting the bed into the "chair" position  He tolerated that well.  Had a session with our massage therapist and did well- telling stories and answering questions, but disoriented to place, and time.  Later in the morning he pulled out his Chan catheter which had to be replaced.  This afternoon he is quite confused, but with tremendous effort by PT was able to shuffle a few steps.  He now has mittens on for patient safety.  Rectal tube removed    Reportedly eating well    ROS:    Unable to obtain due to: delirium                    Dyspnea (Carissa 0-10):   N              N/V (Y/N):                N              Secretions (Y/N) :    N            Agitation(Y/N):        N  Pain (Y/N):     pt reports some discomfort to his rectum. Pt is awaiting the technician for Doppler of BLE to R/O DVT but  team will fully evaluate area following doppler    Allergies    aspirin (Unknown)    Intolerances    Opiate Naive (Y/N): yes, med list obtained from patient's pharmacy    Medications:      MEDICATIONS  (STANDING):  MEDICATIONS  (STANDING):  MEDICATIONS  (STANDING):  apixaban 5 milliGRAM(s) Oral every 12 hours  BACItracin   Ointment 1 Application(s) Topical daily  Biotene Dry Mouth Oral Rinse 5 milliLiter(s) Swish and Spit daily  carbidopa/levodopa  25/100 1 Tablet(s) Oral three times a day  chlorhexidine 2% Cloths 1 Application(s) Topical daily  erythromycin   Ointment 1 Application(s) Right EYE daily  finasteride 5 milliGRAM(s) Oral daily  folic acid 1 milliGRAM(s) Oral daily  lactobacillus acidophilus 1 Tablet(s) Oral three times a day with meals  metoprolol tartrate 75 milliGRAM(s) Oral two times a day  QUEtiapine 12.5 milliGRAM(s) Oral at bedtime  tamsulosin 0.8 milliGRAM(s) Oral at bedtime  vitamin A &amp; D Ointment 1 Application(s) Topical daily    MEDICATIONS  (PRN):  acetaminophen  Suppository 650 milliGRAM(s) Rectal every 6 hours PRN For Temp greater than 38 C (100.4 F)      Labs:                                                                     9.8    6.4   )-----------( 266      ( 04 Sep 2018 06:42 )             30.2   09-04    139  |  102  |  9   ----------------------------<  102<H>  3.5   |  22  |  1.21    Ca    8.5      04 Sep 2018 06:42  Phos  2.7     09-03  Mg     2.0     09-04    TPro  6.3  /  Alb  3.0<L>  /  TBili  0.9  /  DBili  x   /  AST  23  /  ALT  <5<L>  /  AlkPhos  65  09-03      Feces: GI PCR neg  C Diff neg    Imaging: no new imaging    PEx:  Vital Signs Last 24 Hrs  T(C): 36.4 (31 Aug 2018 10:24), Max: 37.4 (30 Aug 2018 22:00)  T(F): 97.5 (31 Aug 2018 10:24), Max: 99.3 (30 Aug 2018 22:00)  HR: 106 (31 Aug 2018 12:23) (94 - 118)  BP: 157/85 (31 Aug 2018 12:23) (106/69 - 157/85)  BP(mean): 112 (31 Aug 2018 12:23) (82 - 114)  RR: 14 (31 Aug 2018 12:23) (14 - 28)  SpO2: 99% (31 Aug 2018 12:23) (98% - 100%)    General: elderly gentleman in bed, confused, pleasant in NAD  mittens on hands  HEENT:  non-icteric sclera, multiple healing  bruises abrasion on chin and both cheeks, dry mucous membranes   Neck: supple no JVD  CVS :irregularly no murmur heard tachycardia  Resp: CTA B/L no RRW  GI:  normal BS, non-tender, no rectal tubel   :  chan in situ  Musc:   equal strength, no significant wasting, no joint erythema or effusion  Neuro: alert, oriented  to name only, states he was the  p resident, no focal deficits   Psych:   confused, calm and cooperative   Skin: diffuse actinic keratoses and evidence of sun damage, multiple abrasions including both knees  Lymph:no adenopathy  Preadmit Karnofsky: presumed 60-70%           Current Karnofsky:     30%  Cachexia (Y/N): no  BMI: 25.4    Advanced Directives:     DNR/DNI     MOLST       Decision maker: Patient presently not able to make complex medical decisions  Legal surrogate: wife Angle Lutz            Documentation of GOC: to recover from this event, to go to rehab.  Wife presently overwhelmed regarding family illness  Hoping for improvement in strength and mental status.               REFERRALS	       Palliative Med               PT/OT

## 2018-09-04 NOTE — PROGRESS NOTE ADULT - PROBLEM SELECTOR PLAN 5
In the setting of dehydration and obstruction from BPH, underlying CKD 3  - Cr today 1.13 (improving)  - trend BMP's  - dc'd fluids as Cr improved and pt now tolerating PO intake  - f/u vanc level and dose according to level to avoid further kidney injury    #diarrhea- pt w diarrhea. rectal tube in place. cdiff ruled out. GI PCR neg  - c/w probiotics  - received 1 dose loperamide 9/1  - continue to monitor In the setting of dehydration and obstruction from BPH, underlying CKD 3  - Cr increased to 1.21 today  - trend BMP's  - giving some IVF today, will consider stopping pending good PO intake  - f/u vanc level and dose according to level to avoid further kidney injury    #diarrhea- pt w diarrhea. rectal tube in place. cdiff ruled out. GI PCR neg  - c/w probiotics  - received 1 dose loperamide 9/1  - continue to monitor In the setting of dehydration and obstruction from BPH, underlying CKD 3  - Cr increased to 1.21 today  - trend BMP's  - giving some IVF today, will consider stopping pending good PO intake  - f/u vanc level and dose according to level to avoid further kidney injury    #diarrhea- pt w diarrhea. cdiff ruled out. GI PCR neg.  w/ improvements in quantity.   - c/w probiotics  - received 1 dose loperamide 9/1  - continue to monitor  - To remove rectal tube today

## 2018-09-04 NOTE — PROGRESS NOTE ADULT - SUBJECTIVE AND OBJECTIVE BOX
INTERVAL HPI/OVERNIGHT EVENTS:  Patient was seen and examined at bedside. As per nurse and patient, no o/n events, patient resting comfortably. No complaints at this time. Patient denies: fever, chills, dizziness, weakness, HA, Changes in vision, CP, palpitations, SOB, cough, N/V/D/C, dysuria, changes in bowel movements, LE edema. ROS otherwise negative.    VITAL SIGNS:  T(F): 97.6 (18 @ 06:00)  HR: 116 (18 @ 04:25)  BP: 158/107 (18 @ 04:25)  RR: 18 (18 @ 04:25)  SpO2: 100% (18 @ 04:25)  Wt(kg): --    PHYSICAL EXAM:    Constitutional: WDWN, NAD  HEENT: PERRL, EOMI, sclera non-icteric, neck supple, trachea midline, no masses, no JVD, MMM, good dentition  Respiratory: CTA b/l, good air entry b/l, no wheezing, no rhonchi, no rales, without accessory muscle use and no intercostal retractions  Cardiovascular: RRR, normal S1S2, no M/R/G  Gastrointestinal: soft, NTND, no masses palpable, BS normal  Extremities: Warm, well perfused, pulses equal bilateral upper and lower extremities, no edema, no clubbing. Capillary refill <2 sec  Neurological: AAOx3, CN Grossly intact  Skin: Normal temperature, warm, dry    MEDICATIONS  (STANDING):  apixaban 5 milliGRAM(s) Oral every 12 hours  BACItracin   Ointment 1 Application(s) Topical daily  Biotene Dry Mouth Oral Rinse 5 milliLiter(s) Swish and Spit daily  carbidopa/levodopa  25/100 1 Tablet(s) Oral three times a day  chlorhexidine 2% Cloths 1 Application(s) Topical daily  erythromycin   Ointment 1 Application(s) Right EYE daily  finasteride 5 milliGRAM(s) Oral daily  folic acid 1 milliGRAM(s) Oral daily  lactated ringers. 1000 milliLiter(s) (75 mL/Hr) IV Continuous <Continuous>  lactobacillus acidophilus 1 Tablet(s) Oral three times a day with meals  metoprolol tartrate 75 milliGRAM(s) Oral two times a day  potassium chloride   Powder 40 milliEquivalent(s) Oral once  QUEtiapine 12.5 milliGRAM(s) Oral at bedtime  tamsulosin 0.8 milliGRAM(s) Oral at bedtime  vitamin A &amp; D Ointment 1 Application(s) Topical daily    MEDICATIONS  (PRN):  acetaminophen  Suppository 650 milliGRAM(s) Rectal every 6 hours PRN For Temp greater than 38 C (100.4 F)      Allergies    aspirin (Unknown)    Intolerances        LABS:                        9.8    6.4   )-----------( 266      ( 04 Sep 2018 06:42 )             30.2     -    139  |  102  |  9   ----------------------------<  102<H>  3.5   |  22  |  1.21    Ca    8.5      04 Sep 2018 06:42  Phos  2.7     -  Mg     2.0     -    TPro  6.3  /  Alb  3.0<L>  /  TBili  0.9  /  DBili  x   /  AST  23  /  ALT  <5<L>  /  AlkPhos  65  -      Urinalysis Basic - ( 03 Sep 2018 07:20 )    Color: Yellow / Appearance: Clear / S.025 / pH: x  Gluc: x / Ketone: NEGATIVE  / Bili: Negative / Urobili: 0.2 E.U./dL   Blood: x / Protein: Trace mg/dL / Nitrite: NEGATIVE   Leuk Esterase: NEGATIVE / RBC: Many /HPF / WBC 5-10 /HPF   Sq Epi: x / Non Sq Epi: 0-5 /HPF / Bacteria: Present /HPF        RADIOLOGY & ADDITIONAL TESTS:  Reviewed INTERVAL HPI/OVERNIGHT EVENTS:  Overnight, pt was noted to be hypertensive. Today AM, pt was having 2 runs of 8 beats of vtach. Pt seen and examined at bedside. He's noted to be comfortable in NAD. His mentation is at baseline. He has several PVCs on monitor in the background setting of afib.  Patient denies: fever, chills, dizziness, weakness, HA, Changes in vision, CP, palpitations, SOB, N/V. ROS otherwise negative.    VITAL SIGNS:  T(F): 97.6 (18 @ 06:00)  HR: 116 (18 @ 04:25)  BP: 158/107 (18 @ 04:25)  RR: 18 (18 @ 04:25)  SpO2: 100% (18 @ 04:25)  Wt(kg): --    PHYSICAL EXAM:    Constitutional: elderly male laying in bed in NAD   Respiratory: Decreased breath sounds at LLL base, no wheezing rales or rhonchi, without accessory muscle use and no intercostal retractions  Cardiovascular: Irregular rhythm, normal S1S2, no M/R/G  Gastrointestinal: soft, NTND, no masses palpable, BS normal  : Good in situ draining yellow urine, rectal tube in place draining dark loose watery stool. Rectum with erythema and skin breakdown surrounding rectal tube  Extremities: Warm, well perfused, pulses equal bilateral upper and lower extremities, no edema; L upper arm with deformity 2/2 trauma; L leg continues to be painful to palpation and movement, + left ankle pressure ulcer  Skin: remaining erythematous areas on face and L knee where previous scabs were located    MEDICATIONS  (STANDING):  apixaban 5 milliGRAM(s) Oral every 12 hours  BACItracin   Ointment 1 Application(s) Topical daily  Biotene Dry Mouth Oral Rinse 5 milliLiter(s) Swish and Spit daily  carbidopa/levodopa  25/100 1 Tablet(s) Oral three times a day  chlorhexidine 2% Cloths 1 Application(s) Topical daily  erythromycin   Ointment 1 Application(s) Right EYE daily  finasteride 5 milliGRAM(s) Oral daily  folic acid 1 milliGRAM(s) Oral daily  lactated ringers. 1000 milliLiter(s) (75 mL/Hr) IV Continuous <Continuous>  lactobacillus acidophilus 1 Tablet(s) Oral three times a day with meals  metoprolol tartrate 75 milliGRAM(s) Oral two times a day  potassium chloride   Powder 40 milliEquivalent(s) Oral once  QUEtiapine 12.5 milliGRAM(s) Oral at bedtime  tamsulosin 0.8 milliGRAM(s) Oral at bedtime  vitamin A &amp; D Ointment 1 Application(s) Topical daily    MEDICATIONS  (PRN):  acetaminophen  Suppository 650 milliGRAM(s) Rectal every 6 hours PRN For Temp greater than 38 C (100.4 F)      Allergies    aspirin (Unknown)    Intolerances        LABS:                        9.8    6.4   )-----------( 266      ( 04 Sep 2018 06:42 )             30.2     -    139  |  102  |  9   ----------------------------<  102<H>  3.5   |  22  |  1.21    Ca    8.5      04 Sep 2018 06:42  Phos  2.7     -  Mg     2.0         TPro  6.3  /  Alb  3.0<L>  /  TBili  0.9  /  DBili  x   /  AST  23  /  ALT  <5<L>  /  AlkPhos  65  -03      Urinalysis Basic - ( 03 Sep 2018 07:20 )    Color: Yellow / Appearance: Clear / S.025 / pH: x  Gluc: x / Ketone: NEGATIVE  / Bili: Negative / Urobili: 0.2 E.U./dL   Blood: x / Protein: Trace mg/dL / Nitrite: NEGATIVE   Leuk Esterase: NEGATIVE / RBC: Many /HPF / WBC 5-10 /HPF   Sq Epi: x / Non Sq Epi: 0-5 /HPF / Bacteria: Present /HPF        RADIOLOGY & ADDITIONAL TESTS:  Reviewed Hospital Course:  89 y/o M with atrial fibrillation (on Toprol XL and Coumadin on admission), HTN, Parkinson's (on Sinemet), BPH, and ETOH abuse, initially found down at home admitted to [facility] for toxic metabolic encephalopathy and sepsis 2/2 UTI. Course was complicated by NILS and afib with RVR to 160s due to missed toprol doses, resolved with lopressor. Pt was downgraded to [facility]. On [facility] pt continued to be confused. He finished a seven-day course of ceftriaxone for UTI, although organisms were never isolated on blood cultures or urine cultures. SW started planning for ALBIN.    On , rapid response was called on [facility]. Pt was found to have NORMA consolidation consistent with aspiration PNA. He was transferred to MICU for observation. Abx coverage changed to zosyn. Pt started on fluids for BP support and was placed on HFNC and was transferred to [facility]. On [facility], sputum cx came back + for MRSA. Pt was placed on isolation and vanc was started (). Pt clinically improved with oxygen requirements decreasing to RA however his mentation remains AAOx1. He completed a 7 day course of zosyn. He was switched to eliquis. Pt also had diarrhea negative for cdiff and GI PCR for which a rectal tube was placed and removed on . Throughout hospitalization pt has continued to be tachycardic requiring increasing lopressor and he has also had electrolyte imbalances initially with hypernatremia and later hyponatremia.     INTERVAL HPI/OVERNIGHT EVENTS:  Overnight, pt was noted to be hypertensive. Today AM, pt was having 2 runs of 8 beats of vtach. Pt seen and examined at bedside. He's noted to be comfortable in NAD. His mentation is at baseline. He has several PVCs on monitor in the background setting of afib.  Patient denies: fever, chills, dizziness, weakness, HA, Changes in vision, CP, palpitations, SOB, N/V. ROS otherwise negative.    VITAL SIGNS:  T(F): 97.6 (18 @ 06:00)  HR: 116 (18 @ 04:25)  BP: 158/107 (18 @ 04:25)  RR: 18 (18 @ 04:25)  SpO2: 100% (09-04-18 @ 04:25)  Wt(kg): --    PHYSICAL EXAM:    Constitutional: elderly male laying in bed in NAD   Respiratory: Decreased breath sounds at LLL base, no wheezing rales or rhonchi, without accessory muscle use and no intercostal retractions  Cardiovascular: Irregular rhythm, normal S1S2, no M/R/G  Gastrointestinal: soft, NTND, no masses palpable, BS normal  : Good in situ draining yellow urine, rectal tube in place draining dark loose watery stool. Rectum with erythema and skin breakdown surrounding rectal tube  Extremities: Warm, well perfused, pulses equal bilateral upper and lower extremities, no edema; L upper arm with deformity 2/2 trauma; L leg continues to be painful to palpation and movement, + left ankle pressure ulcer  Skin: remaining erythematous areas on face and L knee where previous scabs were located    MEDICATIONS  (STANDING):  apixaban 5 milliGRAM(s) Oral every 12 hours  BACItracin   Ointment 1 Application(s) Topical daily  Biotene Dry Mouth Oral Rinse 5 milliLiter(s) Swish and Spit daily  carbidopa/levodopa  25/100 1 Tablet(s) Oral three times a day  chlorhexidine 2% Cloths 1 Application(s) Topical daily  erythromycin   Ointment 1 Application(s) Right EYE daily  finasteride 5 milliGRAM(s) Oral daily  folic acid 1 milliGRAM(s) Oral daily  lactated ringers. 1000 milliLiter(s) (75 mL/Hr) IV Continuous <Continuous>  lactobacillus acidophilus 1 Tablet(s) Oral three times a day with meals  metoprolol tartrate 75 milliGRAM(s) Oral two times a day  potassium chloride   Powder 40 milliEquivalent(s) Oral once  QUEtiapine 12.5 milliGRAM(s) Oral at bedtime  tamsulosin 0.8 milliGRAM(s) Oral at bedtime  vitamin A &amp; D Ointment 1 Application(s) Topical daily    MEDICATIONS  (PRN):  acetaminophen  Suppository 650 milliGRAM(s) Rectal every 6 hours PRN For Temp greater than 38 C (100.4 F)      Allergies    aspirin (Unknown)    Intolerances        LABS:                        9.8    6.4   )-----------( 266      ( 04 Sep 2018 06:42 )             30.2     09-04    139  |  102  |  9   ----------------------------<  102<H>  3.5   |  22  |  1.21    Ca    8.5      04 Sep 2018 06:42  Phos  2.7     -  Mg     2.0         TPro  6.3  /  Alb  3.0<L>  /  TBili  0.9  /  DBili  x   /  AST  23  /  ALT  <5<L>  /  AlkPhos  65        Urinalysis Basic - ( 03 Sep 2018 07:20 )    Color: Yellow / Appearance: Clear / S.025 / pH: x  Gluc: x / Ketone: NEGATIVE  / Bili: Negative / Urobili: 0.2 E.U./dL   Blood: x / Protein: Trace mg/dL / Nitrite: NEGATIVE   Leuk Esterase: NEGATIVE / RBC: Many /HPF / WBC 5-10 /HPF   Sq Epi: x / Non Sq Epi: 0-5 /HPF / Bacteria: Present /HPF        RADIOLOGY & ADDITIONAL TESTS:  Reviewed Hospital Course:  89 y/o M with atrial fibrillation (on Toprol XL and Coumadin on admission), HTN, Parkinson's (on Sinemet), BPH, and ETOH abuse, initially found down at home admitted to [facility] for toxic metabolic encephalopathy and sepsis 2/2 UTI. Course was complicated by NILS and afib with RVR to 160s due to missed toprol doses, resolved with lopressor. Pt was downgraded to [facility]. On [facility] pt continued to be confused. He finished a seven-day course of ceftriaxone for UTI, although organisms were never isolated on blood cultures or urine cultures. SW started planning for ALBIN.    On , rapid response was called on [facility]. Pt was found to have NORMA consolidation consistent with aspiration PNA. He was transferred to MICU for observation. Abx coverage changed to zosyn. Pt started on fluids for BP support and was placed on HFNC and was transferred to [facility]. On [facility], sputum cx came back + for MRSA. Pt was placed on isolation and vanc was started (). Pt clinically improved with oxygen requirements decreasing to RA however his mentation remains AAOx1. He completed a 7 day course of zosyn. He was switched to eliquis. Pt also had diarrhea negative for cdiff and GI PCR for which a rectal tube was placed and removed on . Throughout hospitalization pt has continued to be tachycardic requiring increasing lopressor and he has also had electrolyte imbalances initially with hypernatremia and later hyponatremia. Pt failed TOV .    INTERVAL HPI/OVERNIGHT EVENTS:  Overnight, pt was noted to be hypertensive. Today AM, pt was having 2 runs of 8 beats of vtach. Pt seen and examined at bedside. He's noted to be comfortable in NAD. His mentation is at baseline. He has several PVCs on monitor in the background setting of afib.  Patient denies: fever, chills, dizziness, weakness, HA, Changes in vision, CP, palpitations, SOB, N/V. ROS otherwise negative.    VITAL SIGNS:  T(F): 97.6 (18 @ 06:00)  HR: 116 (18 @ 04:25)  BP: 158/107 (18 @ 04:25)  RR: 18 (18 @ 04:25)  SpO2: 100% (18 @ 04:25)  Wt(kg): --    PHYSICAL EXAM:    Constitutional: elderly male laying in bed in NAD   Respiratory: Decreased breath sounds at LLL base, no wheezing rales or rhonchi, without accessory muscle use and no intercostal retractions  Cardiovascular: Irregular rhythm, normal S1S2, no M/R/G  Gastrointestinal: soft, NTND, no masses palpable, BS normal  : Good in situ draining yellow urine, rectal tube in place draining dark loose watery stool. Rectum with erythema and skin breakdown surrounding rectal tube  Extremities: Warm, well perfused, pulses equal bilateral upper and lower extremities, no edema; L upper arm with deformity 2/2 trauma; L leg continues to be painful to palpation and movement, + left ankle pressure ulcer  Skin: remaining erythematous areas on face and L knee where previous scabs were located    MEDICATIONS  (STANDING):  apixaban 5 milliGRAM(s) Oral every 12 hours  BACItracin   Ointment 1 Application(s) Topical daily  Biotene Dry Mouth Oral Rinse 5 milliLiter(s) Swish and Spit daily  carbidopa/levodopa  25/100 1 Tablet(s) Oral three times a day  chlorhexidine 2% Cloths 1 Application(s) Topical daily  erythromycin   Ointment 1 Application(s) Right EYE daily  finasteride 5 milliGRAM(s) Oral daily  folic acid 1 milliGRAM(s) Oral daily  lactated ringers. 1000 milliLiter(s) (75 mL/Hr) IV Continuous <Continuous>  lactobacillus acidophilus 1 Tablet(s) Oral three times a day with meals  metoprolol tartrate 75 milliGRAM(s) Oral two times a day  potassium chloride   Powder 40 milliEquivalent(s) Oral once  QUEtiapine 12.5 milliGRAM(s) Oral at bedtime  tamsulosin 0.8 milliGRAM(s) Oral at bedtime  vitamin A &amp; D Ointment 1 Application(s) Topical daily    MEDICATIONS  (PRN):  acetaminophen  Suppository 650 milliGRAM(s) Rectal every 6 hours PRN For Temp greater than 38 C (100.4 F)      Allergies    aspirin (Unknown)    Intolerances        LABS:                        9.8    6.4   )-----------( 266      ( 04 Sep 2018 06:42 )             30.2         139  |  102  |  9   ----------------------------<  102<H>  3.5   |  22  |  1.21    Ca    8.5      04 Sep 2018 06:42  Phos  2.7     -  Mg     2.0         TPro  6.3  /  Alb  3.0<L>  /  TBili  0.9  /  DBili  x   /  AST  23  /  ALT  <5<L>  /  AlkPhos  65        Urinalysis Basic - ( 03 Sep 2018 07:20 )    Color: Yellow / Appearance: Clear / S.025 / pH: x  Gluc: x / Ketone: NEGATIVE  / Bili: Negative / Urobili: 0.2 E.U./dL   Blood: x / Protein: Trace mg/dL / Nitrite: NEGATIVE   Leuk Esterase: NEGATIVE / RBC: Many /HPF / WBC 5-10 /HPF   Sq Epi: x / Non Sq Epi: 0-5 /HPF / Bacteria: Present /HPF        RADIOLOGY & ADDITIONAL TESTS:  Reviewed

## 2018-09-04 NOTE — PROGRESS NOTE ADULT - PROBLEM SELECTOR PLAN 1
8/27 pt found to have rigors and was ill appearing with tachycardia to 143, temp to 101.4, RR of 30 and oxygen saturation of 80% on RA. CXR showed new NORMA opacity consistent with aspiration PNA. Lactate 2.7 (trended down to 1.8), and leukocytosis to 11.3 with left shift. Pt was fluid resuscitated with 1L bolus NS and fluids continued. Zosyn was started for empiric coverage. Febrile to 100.4 9/2.  - lactate improved  - sputum cx showed MRSA, 1st dose of vanc (8/31)  - leukocytosis 7.1 today, stabilizing, continue to trend  - c/w zosyn (last day of 7 day course 9/3)  - c/w vanco; monitor levels. 5 day course with goal 15-20  - f/u blood cx; NGTD  - pt also received 7 day course Ceftriaxone 1g q24h last dose on 27th for sepsis 2/2 UTI 8/27 pt found to have rigors and was ill appearing with tachycardia to 143, temp to 101.4, RR of 30 and oxygen saturation of 80% on RA. CXR showed new NORMA opacity consistent with aspiration PNA. Lactate 2.7 (trended down to 1.8), and leukocytosis to 11.3 with left shift. Pt was fluid resuscitated with 1L bolus NS and fluids continued. Zosyn was started for empiric coverage. Afebrile since 9/2 when had fever 100.4.  - lactate improved  - sputum cx showed MRSA, 1st dose of vanc (8/31)  - leukocytosis 7.1 today, stabilizing, continue to trend  - completed 7 day course of zosyn finished 9/4 AM  - c/w vanco; monitor levels. 5 day course with goal 15-20  - f/u blood cx; NGTD  - pt also received 7 day course Ceftriaxone 1g q24h last dose on 27th for sepsis 2/2 UTI

## 2018-09-04 NOTE — PROGRESS NOTE BEHAVIORAL HEALTH - SUMMARY
89M h/o EtOH use disorder, Parkinson's, presenting with AMS in setting of fall at home, acute renal failure, UTI, and now PNA.  Likely EtOH use prior to admission (BAL 0 on this admission but 165 on 8/3) and patient predisposed to delirium given PD.  Continue Seroquel as below for delirium/agitation as lower risk of EPS than many other antipsychotics; would not plan to discharge on this medication. 89M h/o EtOH use disorder, Parkinson's, presenting with AMS in setting of fall at home, acute renal failure, UTI, PNA and sepsis. Likely EtOH use prior to admission (BAL 0 on this admission but 165 on 8/3) and patient predisposed to delirium given PD.  Continue Seroquel as below for delirium/agitation as lower risk of EPS than many other antipsychotics; seroquel to be discontinued prior to discharge.

## 2018-09-04 NOTE — ADVANCED PRACTICE NURSE CONSULT - RECOMMEDATIONS
Will follow up on consultation for IAD
Continue turning and repositioning every 2 hours. Spoke with JIM Juan.

## 2018-09-04 NOTE — PROGRESS NOTE BEHAVIORAL HEALTH - NSBHFUPINTERVALHXFT_PSY_A_CORE
Patient seen and interviewed. Patient continues to present confused (change from baseline) but less agitated.   Patient was seen earilier during the day by Psychologist Intern Miguel Wei: Note " Pt was seen today by writer, at bedside. Pt appeared calm. It appears as though his level of agitation has decreased throughout this hospital stay. Pt displayed good eye contact and was open and cooperative during interview. He reported that he has been sleeping and eating well. Pt was oriented to self but could not identify the date, saying that it was “Monday, early October 18”. Upon interview, pt appeared at times to be confused about being in the hospital but at other points, was perseverative about the staff not allowing him to go to the restroom on his own. When asked about social support, pt shared a convoluted story about meeting women online and dating them but when asked who he lived with, pt reported that he lives with his wife. Pt did not appear to be responding to internal stimuli. Writer consulted with resident, Dr. Resendiz, who reported that pt’s level of confusion has remained the same throughout this hospitalization.   Writer spoke with pts wife, Mrs. Angle uLtz, who reported that pt did not appear to be confused or disoriented prior to this hospitalization. Mrs. Lutz reported that prior to the hospitalization, pt was “fine” and “knew exactly where he was”. Mrs. Lutz reported that pt walked 2.5 blocks to the liquor store every day and managed all of his medications independently.   Mrs. Lutz reported that pt purchased 2 “airplane bottles” of liquor each day but that she was not sure of the amount. She reported that he would go to the liquor store, play lottery then return home and drink alcohol and sleep." Patient seen and interviewed. Patient continues to present confused (change from baseline) but less agitated. Patient has been taking his nighttime seroquel and hasn't required any prns for agitation. Patient is currently treated for sepsis.  Patient was seen earilier during the day by Psychologist Intern Miguel Wei: Note " Pt was seen today by writer, at bedside. Pt appeared calm. It appears as though his level of agitation has decreased throughout this hospital stay. Pt displayed good eye contact and was open and cooperative during interview. He reported that he has been sleeping and eating well. Pt was oriented to self but could not identify the date, saying that it was “Monday, early October 18”. Upon interview, pt appeared at times to be confused about being in the hospital but at other points, was perseverative about the staff not allowing him to go to the restroom on his own. When asked about social support, pt shared a convoluted story about meeting women online and dating them but when asked who he lived with, pt reported that he lives with his wife. Pt did not appear to be responding to internal stimuli. Writer consulted with resident, Dr. Resendiz, who reported that pt’s level of confusion has remained the same throughout this hospitalization.   Writer spoke with pts wife, Mrs. Angle Lutz, who reported that pt did not appear to be confused or disoriented prior to this hospitalization. Mrs. Lutz reported that prior to the hospitalization, pt was “fine” and “knew exactly where he was”. Mrs. Lutz reported that pt walked 2.5 blocks to the liquor store every day and managed all of his medications independently.   Mrs. Lutz reported that pt purchased 2 “airplane bottles” of liquor each day but that she was not sure of the amount. She reported that he would go to the liquor store, play lottery then return home and drink alcohol and sleep."

## 2018-09-04 NOTE — PROGRESS NOTE ADULT - ASSESSMENT
89 yo with Parkinsons found face down at home following a fall 24-48 hours previously.  Dehydrated hypotensive, renal failure.  Perhaps some role of alcohol although screen was negative.  Renal failure had improved but patient reemained confused.  Now with likely aspiration and sepsis transferred to and medically is  doing better  delirium persists

## 2018-09-04 NOTE — PROGRESS NOTE ADULT - SUBJECTIVE AND OBJECTIVE BOX
Physical Medicine and Rehabilitation Progress Note:    Patient is a 89y old  Male who presents with a chief complaint of Toxic Metabolic Encephalopathy, Acute Renal Failure, (04 Sep 2018 15:48)      HPI:  89M with Parkinson's (on Sinemet), Afib (Toprol XL and Coumadin), HTN, BPH and ETOH abuse who was BIBEMS after being found down at home. Patient was found face down on the floor by his cleaning lady today w/ periorbital swelling and bruising to bilateral cheeks. As per wife patient was last seen well 5 days PTA but she spoke to him on the phone 2 days PTA. Patient was altered on presentation an unable to provide history.     At [facility] T 96.5,  in Afib, /91, RR18 [id number] Labs showed BUN/Cr significantly elevated from baseline. Minor trop elevation. UA positive for LE, WBC, Nitrite, and Bacteria. Patient was given Ceftriaxone, 0.5mg Ativan due to tremor, and 2LNS. He was started on bicarb drip and transferred to [facility].     On arrival to [facility] patient minimally responsive. He affirmed to chills, denied fever. Affirmed to nausea. Denied vomiting, abd pain, changes in stool, blood in stool. He denies changes in urinary habits. (21 Aug 2018 16:56)                            9.8    6.4   )-----------( 266      ( 04 Sep 2018 06:42 )             30.2       09-04    139  |  102  |  9   ----------------------------<  102<H>  3.5   |  22  |  1.21    Ca    8.5      04 Sep 2018 06:42  Phos  2.7     09-03  Mg     2.0     09-04    TPro  6.3  /  Alb  3.0<L>  /  TBili  0.9  /  DBili  x   /  AST  23  /  ALT  <5<L>  /  AlkPhos  65  09-03    Vital Signs Last 24 Hrs  T(C): 37.3 (04 Sep 2018 16:56), Max: 37.3 (04 Sep 2018 16:56)  T(F): 99.1 (04 Sep 2018 16:56), Max: 99.1 (04 Sep 2018 16:56)  HR: 132 (04 Sep 2018 16:12) (86 - 132)  BP: 161/85 (04 Sep 2018 16:12) (143/79 - 176/95)  BP(mean): 117 (04 Sep 2018 16:12) (98 - 129)  RR: 18 (04 Sep 2018 16:12) (18 - 19)  SpO2: 99% (04 Sep 2018 16:12) (94% - 100%)    MEDICATIONS  (STANDING):  apixaban 5 milliGRAM(s) Oral every 12 hours  BACItracin   Ointment 1 Application(s) Topical daily  Biotene Dry Mouth Oral Rinse 5 milliLiter(s) Swish and Spit daily  carbidopa/levodopa  25/100 1 Tablet(s) Oral three times a day  chlorhexidine 2% Cloths 1 Application(s) Topical daily  erythromycin   Ointment 1 Application(s) Right EYE daily  finasteride 5 milliGRAM(s) Oral daily  folic acid 1 milliGRAM(s) Oral daily  lactobacillus acidophilus 1 Tablet(s) Oral three times a day with meals  metoprolol tartrate 75 milliGRAM(s) Oral two times a day  QUEtiapine 12.5 milliGRAM(s) Oral at bedtime  tamsulosin 0.8 milliGRAM(s) Oral at bedtime  vitamin A &amp; D Ointment 1 Application(s) Topical daily    MEDICATIONS  (PRN):  acetaminophen  Suppository 650 milliGRAM(s) Rectal every 6 hours PRN For Temp greater than 38 C (100.4 F)    Currently Undergoing Physical Therapy at bedside.    Functional Status Assessment:      Coping  Observed Emotional State: calm;  cooperative  Verbalized Emotional State: acceptance    Plan of Care Reviewed with: patient    Cognitive/Neuro    Cognitive/Perceptual/Neuro  Cognitive/Neuro/Behavioral [WDL Definition: Alert; opens eyes spontaneously; arouses to voice or touch; oriented x 4; follows commands; speech spontaneous, logical; purposeful motor response; behavior appropriate to situation]: WDL except  Level of Consciousness: confused  Arousal Level: arouses to voice;  opens eyes spontaneously  Orientation: disoriented to;  place;  time    Therapeutic Interventions      Bed Mobility  Bed Mobility Training Sit-to-Supine: moderate assist (50% patient effort);  2 person assist;  nonverbal cues (demo/gestures);  verbal cues  Bed Mobility Training Supine-to-Sit: moderate assist (50% patient effort);  minimum assist (75% patient effort);  2 person assist;  verbal cues;  nonverbal cues (demo/gestures);  bed rails  Bed Mobility Training Limitations: decreased ability to use arms for pushing/pulling;  decreased ability to use legs for bridging/pushing;  impaired ability to control trunk for mobility;  decreased strength;  impaired balance    Sit-Stand Transfer Training  Transfer Training Sit-to-Stand Transfer: maximum assist (25% patient effort);  2 person assist;  verbal cues;  nonverbal cues (demo/gestures);  weight-bearing as tolerated   rolling walker;  3 trials   Transfer Training Stand-to-Sit Transfer: 2 person assist;  verbal cues;  nonverbal cues (demo/gestures);  weight-bearing as tolerated   rolling walker;  3 trials ;  moderate assist (50% patient effort)  Sit-to-Stand Transfer Training Transfer Safety Analysis: decreased balance;  decreased strength;  impaired balance;  impaired postural control;  rolling walker    Gait Training  Gait Training: maximum assist (25% patient effort);  moderate assist (50% patient effort);  2 person assist;  verbal cues;  nonverbal cues (demo/gestures);  weight-bearing as tolerated   rolling walker;  3 sied steps x 2   Gait Analysis: unsteady gait, no lose of balance, difficulty to move RLE during side stepping, retropulsion. ;  decreased weight-shifting ability;  decreased strength;  impaired balance;  impaired postural control;  rolling walker  Type of Rest Type of Rest: sitting  Duration of Rest Duration of Rest: 1 brief sitting break(1 minute)     Therapeutic Exercise  Therapeutic Exercise Detail: Pt dangled at edge of bed for~12 mins with min A<--> contact guard. wt shifting anterior<--> posterior with contact guard x 10 trials           PM&R Impression: as above    Disposition Plan Recommendations: subacute rehab placement

## 2018-09-04 NOTE — PROGRESS NOTE ADULT - PROBLEM SELECTOR PLAN 6
- c/w home Sinemet    #BPH  - continue home tamsulosin 0.4 mg daily  - continue home finasteride  - failed TOV 9/1. Good replaced - c/w home Sinemet    #BPH  - continue home tamsulosin 0.4 mg daily  - continue home finasteride  - failed TOV 9/1. Good replaced; consider TOV again tomorrow

## 2018-09-04 NOTE — PROGRESS NOTE ADULT - PROBLEM SELECTOR PLAN 6
seen by speech and swallow  cleared for mechanical soft diet with nectar thick liquids  tolerating diet well staff states he ate 100 % of breakfast, did require staff feeder

## 2018-09-04 NOTE — PROGRESS NOTE ADULT - PROBLEM SELECTOR PLAN 1
persists.  Suggest more focus on environmental isssues  OOB OOB to chair, reorientation   continue Seroquel 12.5 mg po q 12 hr with q 8 hr prn dosing

## 2018-09-04 NOTE — PROGRESS NOTE ADULT - PROBLEM SELECTOR PLAN 2
Remains confused with resolving  delirium   treated with high dose thiamine vitamins and fluids thus far

## 2018-09-04 NOTE — PROGRESS NOTE ADULT - PROBLEM SELECTOR PLAN 2
Pt has sputum culture positive for MRSA.  - c/w vanco; f/u vanc levels and redose accordingly (needs 5 day course of therapeutic levels 15-20)  - c/w zosyn (last day 9/3 for 7 day course)  - f/u blood cx  - continue to trend CBC    # Hypoxic hypercapnic respiratory failure likely 2/2 PNA  - Pt found to have PNA w/ increased respiratory needs. ABG showed met acidosis with resp alkalosis  - pt now on 2L NC; will titrate to RA today if tolerated  - will cont with oxygen supplementation and pulmonary toilet Pt has sputum culture positive for MRSA.  - c/w vanco; f/u vanc levels and redose accordingly (needs 5 day course of therapeutic levels 15-20)  - completed 7 day course of zosyn 9/4 AM  - f/u blood cx  - continue to trend CBC    # Hypoxic hypercapnic respiratory failure likely 2/2 PNA  - Pt found to have PNA w/ increased respiratory needs. ABG showed met acidosis with resp alkalosis  - pt now on 2L NC; will titrate to RA today if tolerated  - will cont with oxygen supplementation and pulmonary toilet Pt has sputum culture positive for MRSA.  - c/w vanco; f/u vanc levels and redose accordingly (needs 5 day course of therapeutic levels 15-20)  - completed 7 day course of zosyn 9/4 AM  - f/u blood cx  - continue to trend CBC    # Hypoxic hypercapnic respiratory failure likely 2/2 PNA  RESOLVED  - Pt found to have PNA w/ increased respiratory needs. ABG showed met acidosis with resp alkalosis  - pt on RA  - c/w pulmonary toilet  - monitor for signs of mucus plugging Pt has sputum culture positive for MRSA.  - c/w vanco; f/u vanc levels and redose accordingly (needs 5 day course of therapeutic levels 15-20)  - completed 7 day course of zosyn 9/4 AM  - f/u blood cx, NGTD  - continue to trend CBC    # Hypoxic hypercapnic respiratory failure likely 2/2 PNA  RESOLVED  - Pt found to have PNA w/ increased respiratory needs. ABG showed met acidosis with resp alkalosis  - pt on RA  - c/w pulmonary toilet  - monitor for signs of mucus plugging

## 2018-09-04 NOTE — PROGRESS NOTE ADULT - PROBLEM SELECTOR PLAN 5
caused by gram neg bacteria  sputum culture positive for MRSA on precautions.  Answered wife's questions   c/w zosyn per medicine   WBC WNL this AM

## 2018-09-04 NOTE — PROGRESS NOTE ADULT - PROBLEM SELECTOR PLAN 7
Pt has hx of alcohol abuse. Last drink prior to admission. Alcohol level 0 on admission. CIWA consistently 0. Pt received >1week of thiamine   - c/w Folate    # Thrombocytopenia  - likely in setting of etoh abuse  - continue to trend CBC    # Anemia   - stable  - continue to trend CBC Pt has hx of alcohol abuse. Last drink prior to admission. Alcohol level 0 on admission. CIWA consistently 0. Pt received >1week of thiamine   - c/w Folate    # Thrombocytopenia  - likely in setting of etoh abuse  - continue to trend CBC    # Anemia   - stable  - continue to trend CBC    #wound care  - pt with ulcer on L heal and erythema and skin breakdown surrounding rectal tube  - remove rectal tube today  - f/u wound care

## 2018-09-05 LAB
ANION GAP SERPL CALC-SCNC: 13 MMOL/L — SIGNIFICANT CHANGE UP (ref 5–17)
BUN SERPL-MCNC: 11 MG/DL — SIGNIFICANT CHANGE UP (ref 7–23)
CALCIUM SERPL-MCNC: 8.5 MG/DL — SIGNIFICANT CHANGE UP (ref 8.4–10.5)
CHLORIDE SERPL-SCNC: 100 MMOL/L — SIGNIFICANT CHANGE UP (ref 96–108)
CO2 SERPL-SCNC: 24 MMOL/L — SIGNIFICANT CHANGE UP (ref 22–31)
CREAT SERPL-MCNC: 1.12 MG/DL — SIGNIFICANT CHANGE UP (ref 0.5–1.3)
GLUCOSE BLDC GLUCOMTR-MCNC: 101 MG/DL — HIGH (ref 70–99)
GLUCOSE SERPL-MCNC: 107 MG/DL — HIGH (ref 70–99)
HCT VFR BLD CALC: 30.2 % — LOW (ref 39–50)
HGB BLD-MCNC: 9.7 G/DL — LOW (ref 13–17)
MAGNESIUM SERPL-MCNC: 1.9 MG/DL — SIGNIFICANT CHANGE UP (ref 1.6–2.6)
MCHC RBC-ENTMCNC: 31.2 PG — SIGNIFICANT CHANGE UP (ref 27–34)
MCHC RBC-ENTMCNC: 32.1 G/DL — SIGNIFICANT CHANGE UP (ref 32–36)
MCV RBC AUTO: 97.1 FL — SIGNIFICANT CHANGE UP (ref 80–100)
PHOSPHATE SERPL-MCNC: 2.6 MG/DL — SIGNIFICANT CHANGE UP (ref 2.5–4.5)
PLATELET # BLD AUTO: 304 K/UL — SIGNIFICANT CHANGE UP (ref 150–400)
POTASSIUM SERPL-MCNC: 3.5 MMOL/L — SIGNIFICANT CHANGE UP (ref 3.5–5.3)
POTASSIUM SERPL-SCNC: 3.5 MMOL/L — SIGNIFICANT CHANGE UP (ref 3.5–5.3)
RBC # BLD: 3.11 M/UL — LOW (ref 4.2–5.8)
RBC # FLD: 14 % — SIGNIFICANT CHANGE UP (ref 10.3–16.9)
SODIUM SERPL-SCNC: 137 MMOL/L — SIGNIFICANT CHANGE UP (ref 135–145)
TSH SERPL-MCNC: 3.35 UIU/ML — SIGNIFICANT CHANGE UP (ref 0.35–4.94)
WBC # BLD: 5.1 K/UL — SIGNIFICANT CHANGE UP (ref 3.8–10.5)
WBC # FLD AUTO: 5.1 K/UL — SIGNIFICANT CHANGE UP (ref 3.8–10.5)

## 2018-09-05 PROCEDURE — 99233 SBSQ HOSP IP/OBS HIGH 50: CPT | Mod: GC

## 2018-09-05 PROCEDURE — 71045 X-RAY EXAM CHEST 1 VIEW: CPT | Mod: 26

## 2018-09-05 PROCEDURE — 99233 SBSQ HOSP IP/OBS HIGH 50: CPT

## 2018-09-05 RX ORDER — VANCOMYCIN HCL 1 G
1500 VIAL (EA) INTRAVENOUS ONCE
Qty: 0 | Refills: 0 | Status: COMPLETED | OUTPATIENT
Start: 2018-09-05 | End: 2018-09-05

## 2018-09-05 RX ORDER — MAGNESIUM SULFATE 500 MG/ML
1 VIAL (ML) INJECTION ONCE
Qty: 0 | Refills: 0 | Status: COMPLETED | OUTPATIENT
Start: 2018-09-05 | End: 2018-09-05

## 2018-09-05 RX ORDER — POTASSIUM CHLORIDE 20 MEQ
10 PACKET (EA) ORAL
Qty: 0 | Refills: 0 | Status: COMPLETED | OUTPATIENT
Start: 2018-09-05 | End: 2018-09-05

## 2018-09-05 RX ORDER — SODIUM CHLORIDE 9 MG/ML
500 INJECTION INTRAMUSCULAR; INTRAVENOUS; SUBCUTANEOUS ONCE
Qty: 0 | Refills: 0 | Status: COMPLETED | OUTPATIENT
Start: 2018-09-05 | End: 2018-09-05

## 2018-09-05 RX ADMIN — Medication 100 MILLIEQUIVALENT(S): at 20:00

## 2018-09-05 RX ADMIN — Medication 100 MILLIEQUIVALENT(S): at 15:44

## 2018-09-05 RX ADMIN — FINASTERIDE 5 MILLIGRAM(S): 5 TABLET, FILM COATED ORAL at 12:27

## 2018-09-05 RX ADMIN — CARBIDOPA AND LEVODOPA 1 TABLET(S): 25; 100 TABLET ORAL at 22:05

## 2018-09-05 RX ADMIN — APIXABAN 5 MILLIGRAM(S): 2.5 TABLET, FILM COATED ORAL at 07:02

## 2018-09-05 RX ADMIN — Medication 100 MILLIEQUIVALENT(S): at 17:23

## 2018-09-05 RX ADMIN — APIXABAN 5 MILLIGRAM(S): 2.5 TABLET, FILM COATED ORAL at 17:16

## 2018-09-05 RX ADMIN — QUETIAPINE FUMARATE 12.5 MILLIGRAM(S): 200 TABLET, FILM COATED ORAL at 22:05

## 2018-09-05 RX ADMIN — TAMSULOSIN HYDROCHLORIDE 0.8 MILLIGRAM(S): 0.4 CAPSULE ORAL at 22:06

## 2018-09-05 RX ADMIN — Medication 100 GRAM(S): at 22:05

## 2018-09-05 RX ADMIN — Medication 1 APPLICATION(S): at 12:27

## 2018-09-05 RX ADMIN — SODIUM CHLORIDE 1000 MILLILITER(S): 9 INJECTION INTRAMUSCULAR; INTRAVENOUS; SUBCUTANEOUS at 09:41

## 2018-09-05 RX ADMIN — Medication 75 MILLIGRAM(S): at 07:02

## 2018-09-05 RX ADMIN — Medication 1 TABLET(S): at 12:28

## 2018-09-05 RX ADMIN — Medication 1 MILLIGRAM(S): at 12:28

## 2018-09-05 RX ADMIN — Medication 300 MILLIGRAM(S): at 23:30

## 2018-09-05 RX ADMIN — Medication 1 APPLICATION(S): at 12:26

## 2018-09-05 RX ADMIN — CARBIDOPA AND LEVODOPA 1 TABLET(S): 25; 100 TABLET ORAL at 15:44

## 2018-09-05 RX ADMIN — Medication 1 APPLICATION(S): at 12:38

## 2018-09-05 RX ADMIN — Medication 75 MILLIGRAM(S): at 17:15

## 2018-09-05 RX ADMIN — Medication 300 MILLIGRAM(S): at 00:46

## 2018-09-05 RX ADMIN — Medication 1 TABLET(S): at 17:16

## 2018-09-05 RX ADMIN — Medication 5 MILLILITER(S): at 12:38

## 2018-09-05 RX ADMIN — Medication 1 TABLET(S): at 07:02

## 2018-09-05 RX ADMIN — CARBIDOPA AND LEVODOPA 1 TABLET(S): 25; 100 TABLET ORAL at 07:02

## 2018-09-05 NOTE — PROGRESS NOTE ADULT - ASSESSMENT
89 y/o M with hx of Parkinson disease, HTN, BPH, ETOH abuse, afib on Coumadin, found down at home for unknown period of time (up to 2 days) admitted for toxic metabolic encephalopathy and acute renal failure and found to have UTI s/p 7 day tx w/ ceftriaxone now with NORMA PNA. 89 y/o M with hx of Parkinson disease, HTN, BPH, ETOH abuse, afib on Coumadin, found down at home for unknown period of time (up to 2 days) admitted for toxic metabolic encephalopathy and acute renal failure and found to have UTI s/p 7 day tx w/ ceftriaxone now with NORMA PNA. S/p zosyn 8/30-9/4. 89 y/o M with hx of Parkinson disease, HTN, BPH, ETOH abuse, afib on Coumadin, found down at home for unknown period of time (up to 2 days) admitted for toxic metabolic encephalopathy and acute renal failure and found to have UTI s/p 7 day tx w/ ceftriaxone now with NORMA PNA. S/p zosyn 8/30-9/4. Vanc 1 dose on 8/28, 8/31-9/6 for 7 day tx course 89 y/o M with hx of Parkinson disease, HTN, BPH, ETOH abuse, afib on Coumadin, found down at home for unknown period of time (up to 2 days) admitted for toxic metabolic encephalopathy and acute renal failure and found to have UTI s/p 7 day tx w/ ceftriaxone now with NORMA PNA. S/p zosyn 8/30-9/4. Vanc 1 dose on 8/28, 8/30-9/5 for 7 day tx course

## 2018-09-05 NOTE — PROGRESS NOTE ADULT - SUBJECTIVE AND OBJECTIVE BOX
SADIA LUTZ   MRN-[id number]     ([id number]):   CC: persistent delirium   HPI:  89M with Parkinson's (on Sinemet), Afib (Toprol XL and Coumadin), HTN, BPH and ETOH abuse who was BIBEMS after being found down at home. Wife was out of town caring for another sick family member.  He was alone and probably fell 24-48 hour earlier.  On admission was in renal failure and found to have a UTI.  Toxic screen was negative for drugs or alcohol.    Subjective:   Patient's delirium persists  Pateint seen twice today by PT and although still confused, was able to participate a bit    This afternoon he is quite confused,  but overall seemed calmer.  His stories were confabulatory  He now has a posey on for patient safety.  Rectal tube removed yesterday.  Eating well.  He tells me he has a good appetite.  He also points out that he is building up his strength by coughing an clearing his throat    Wife at bedside.  He cannot tell me her name, nor that she is his wife.    ROS:    Unable to obtain due to: delirium                    Dyspnea (Carissa 0-10):   N              N/V (Y/N):                N              Secretions (Y/N) :    N            Agitation(Y/N):        N  Pain (Y/N):     Patient again reporting some knee pain.  He says its worse when people make him move around    Allergies    aspirin (Unknown)    Intolerances    Opiate Naive (Y/N): yes, med list obtained from patient's pharmacy    Medications:      MEDICATIONS  (STANDING):  apixaban 5 milliGRAM(s) Oral every 12 hours  BACItracin   Ointment 1 Application(s) Topical daily  Biotene Dry Mouth Oral Rinse 5 milliLiter(s) Swish and Spit daily  carbidopa/levodopa  25/100 1 Tablet(s) Oral three times a day  chlorhexidine 2% Cloths 1 Application(s) Topical daily  erythromycin   Ointment 1 Application(s) Right EYE daily  finasteride 5 milliGRAM(s) Oral daily  folic acid 1 milliGRAM(s) Oral daily  lactobacillus acidophilus 1 Tablet(s) Oral three times a day with meals  magnesium sulfate  IVPB 1 Gram(s) IV Intermittent once  metoprolol tartrate 75 milliGRAM(s) Oral two times a day  potassium chloride  10 mEq/100 mL IVPB 10 milliEquivalent(s) IV Intermittent every 1 hour  QUEtiapine 12.5 milliGRAM(s) Oral at bedtime  tamsulosin 0.8 milliGRAM(s) Oral at bedtime  vancomycin  IVPB 1500 milliGRAM(s) IV Intermittent once  vitamin A &amp; D Ointment 1 Application(s) Topical daily    MEDICATIONS  (PRN):  acetaminophen  Suppository 650 milliGRAM(s) Rectal every 6 hours PRN For Temp greater than 38 C (100.4 F)        Labs:                                                                      9.7    5.1   )-----------( 304      ( 05 Sep 2018 06:50 )             30.2   09-05    137  |  100  |  11  ----------------------------<  107<H>  3.5   |  24  |  1.12    Ca    8.5      05 Sep 2018 06:50  Phos  2.6     09-05  Mg     1.9     -05          Feces: GI PCR neg  C Diff neg    Imaging: no new imaging    PEx:  Vital Signs Last 24 Hrs  T(C): 36.4 (31 Aug 2018 10:24), Max: 37.4 (30 Aug 2018 22:00)  T(F): 97.5 (31 Aug 2018 10:24), Max: 99.3 (30 Aug 2018 22:00)  HR: 106 (31 Aug 2018 12:23) (94 - 118)  BP: 157/85 (31 Aug 2018 12:23) (106/69 - 157/85)  BP(mean): 112 (31 Aug 2018 12:23) (82 - 114)  RR: 14 (31 Aug 2018 12:23) (14 - 28)  SpO2: 99% (31 Aug 2018 12:23) (98% - 100%)    General: elderly gentleman in bed, confused, pleasant in NAD  mittens on hands  HEENT:  non-icteric sclera, multiple healing  bruises abrasion on chin and both cheeks, dry mucous membranes   Neck: supple no JVD  CVS :irregularly no murmur heard tachycardia  Resp: CTA B/L no RRW  GI:  normal BS, non-tender, no rectal tubel   :  chan in situ  Musc:   equal strength, no significant wasting, no joint erythema or effusion  Neuro: alert, oriented  to name only, states he was the  p resident, no focal deficits   Psych:   confused, calm and cooperative   Skin: diffuse actinic keratoses and evidence of sun damage, multiple abrasions including both knees  Lymph:no adenopathy  Preadmit Karnofsky: presumed 60-70%           Current Karnofsky:     30%  Cachexia (Y/N): no  BMI: 25.4    Advanced Directives:     DNR/DNI     MOLST       Decision maker: Patient presently not able to make complex medical decisions  Legal surrogate: wife Angle Lutz            Documentation of GOC: to recover from this event, to go to rehab.  Wife presently overwhelmed regarding family illness  Hoping for improvement in strength and mental status.               REFERRALS	       Palliative Med               PT/OT

## 2018-09-05 NOTE — PROGRESS NOTE ADULT - ASSESSMENT
89 yo with Parkinsons found face down at home following a fall 24-48 hours previously.  Dehydrated hypotensive, renal failure.  Perhaps some role of alcohol although screen was negative.  Renal failure had improved but patient reemained confused.  Most recently with likely aspiration and sepsis transferred to and medically is  doing better however delirium persists

## 2018-09-05 NOTE — PROGRESS NOTE BEHAVIORAL HEALTH - NSBHCHARTREVIEWVS_PSY_A_CORE FT
Vital Signs Last 24 Hrs  T(C): 37.2 (05 Sep 2018 13:03), Max: 37.3 (04 Sep 2018 16:56)  T(F): 98.9 (05 Sep 2018 13:03), Max: 99.1 (04 Sep 2018 16:56)  HR: 104 (05 Sep 2018 12:56) (76 - 132)  BP: 136/85 (05 Sep 2018 12:56) (85/52 - 176/95)  BP(mean): 102 (05 Sep 2018 12:56) (63 - 117)  RR: 18 (05 Sep 2018 08:54) (18 - 18)  SpO2: 98% (05 Sep 2018 12:56) (95% - 99%)
Vital Signs Last 24 Hrs  T(C): 36.8 (31 Aug 2018 13:02), Max: 37.4 (30 Aug 2018 22:00)  T(F): 98.2 (31 Aug 2018 13:02), Max: 99.3 (30 Aug 2018 22:00)  HR: 106 (31 Aug 2018 12:23) (94 - 118)  BP: 157/85 (31 Aug 2018 12:23) (106/69 - 157/85)  BP(mean): 112 (31 Aug 2018 12:23) (82 - 114)  RR: 14 (31 Aug 2018 12:23) (14 - 28)  SpO2: 99% (31 Aug 2018 12:23) (98% - 100%)

## 2018-09-05 NOTE — PROGRESS NOTE BEHAVIORAL HEALTH - NSBHCHARTREVIEWLAB_PSY_A_CORE FT
CBC Full  -  ( 05 Sep 2018 06:50 )  WBC Count : 5.1 K/uL  Hemoglobin : 9.7 g/dL  Hematocrit : 30.2 %  Platelet Count - Automated : 304 K/uL  Mean Cell Volume : 97.1 fL  Mean Cell Hemoglobin : 31.2 pg  Mean Cell Hemoglobin Concentration : 32.1 g/dL  Auto Neutrophil # : x  Auto Lymphocyte # : x  Auto Monocyte # : x  Auto Eosinophil # : x  Auto Basophil # : x  Auto Neutrophil % : x  Auto Lymphocyte % : x  Auto Monocyte % : x  Auto Eosinophil % : x  Auto Basophil % : x  09-05    137  |  100  |  11  ----------------------------<  107<H>  3.5   |  24  |  1.12    Ca    8.5      05 Sep 2018 06:50  Phos  2.6     09-05  Mg     1.9     09-05
10.0   7.2   )-----------( 160      ( 31 Aug 2018 06:41 )             31.1   08-31    139  |  108  |  14  ----------------------------<  95  3.6   |  19<L>  |  1.12    Ca    8.4      31 Aug 2018 06:40  Phos  2.4     08-30  Mg     1.8     08-30

## 2018-09-05 NOTE — PROGRESS NOTE ADULT - PROBLEM SELECTOR PLAN 7
Pt has hx of alcohol abuse. Last drink prior to admission. Alcohol level 0 on admission. CIWA consistently 0. Pt received >1week of thiamine   - c/w Folate    # Thrombocytopenia  - likely in setting of etoh abuse  - continue to trend CBC    # Anemia   - stable  - continue to trend CBC    #wound care  - pt with ulcer on L heal and erythema and skin breakdown surrounding rectal tube  - remove rectal tube today  - f/u wound care

## 2018-09-05 NOTE — CHART NOTE - NSCHARTNOTEFT_GEN_A_CORE
Admitting Diagnosis:   Patient is a 89y old  Male who presents with a chief complaint of Toxic Metabolic Encephalopathy, Acute Renal Failure, (05 Sep 2018 06:23)      PAST MEDICAL & SURGICAL HISTORY:  Parkinson's disease  BPH (benign prostatic hyperplasia)  HLD (hyperlipidemia)  HTN (hypertension)  Atrial fibrillation  No significant past surgical history      Current Nutrition Order:  Mechanical soft/nectar thick liquids     PO Intake: Good (%) [ X  ]  Fair (50-75%) [   ] Poor (<25%) [   ]    GI Issues: No N/V/C/D reported; rectal tube removed     Pain: No pain reported     Skin Integrity: L. heel unstageable pressure injury, IAD, L. knee abrasion     Labs:   09-05    137  |  100  |  11  ----------------------------<  107<H>  3.5   |  24  |  1.12    Ca    8.5      05 Sep 2018 06:50  Phos  2.6     09-05  Mg     1.9     09-05      CAPILLARY BLOOD GLUCOSE      POCT Blood Glucose.: 101 mg/dL (05 Sep 2018 06:40)      Medications:  MEDICATIONS  (STANDING):  apixaban 5 milliGRAM(s) Oral every 12 hours  BACItracin   Ointment 1 Application(s) Topical daily  Biotene Dry Mouth Oral Rinse 5 milliLiter(s) Swish and Spit daily  carbidopa/levodopa  25/100 1 Tablet(s) Oral three times a day  chlorhexidine 2% Cloths 1 Application(s) Topical daily  erythromycin   Ointment 1 Application(s) Right EYE daily  finasteride 5 milliGRAM(s) Oral daily  folic acid 1 milliGRAM(s) Oral daily  lactobacillus acidophilus 1 Tablet(s) Oral three times a day with meals  magnesium sulfate  IVPB 1 Gram(s) IV Intermittent once  metoprolol tartrate 75 milliGRAM(s) Oral two times a day  potassium chloride  10 mEq/100 mL IVPB 10 milliEquivalent(s) IV Intermittent every 1 hour  QUEtiapine 12.5 milliGRAM(s) Oral at bedtime  tamsulosin 0.8 milliGRAM(s) Oral at bedtime  vancomycin  IVPB 1500 milliGRAM(s) IV Intermittent once  vitamin A &amp; D Ointment 1 Application(s) Topical daily    MEDICATIONS  (PRN):  acetaminophen  Suppository 650 milliGRAM(s) Rectal every 6 hours PRN For Temp greater than 38 C (100.4 F)      Weight: 85kg  Daily     Daily     Weight Change: No new weights recorded since admit     Estimated energy needs: ABW used for calculations as pt between % of IBW. Needs estimated for maintenance in older adults; adjusted for current clinical status, wound healing   Calories: 25-30 kcal/kg = 7700-8915 kcal/day  Protein: 1.2-1.4 g/kg = 102-119 g protein/day  Fluids: 30-35 mL/kg = 1004-1298 mL/day    Subjective: 89 yo/male with PMHx Parkinsons, Afib, HTN, BPH, EtOH abuse, presented after being found down at home. Found to be in severe sepsis and toxic metabolic encephalopathy possible 2/2 UTI. On 8/28 patient developed rigors, hypotension, afib- transferred to MICU s/p rapid response. Pt now stable on 7 [facility] again. S/p FEES on 8/29, recommending mechanical soft with nectar thick liquid diet, with high aspiration precautions. Pt seen in room, awake, alert, very conversational today. He endorses good appetite and intake, and denies any trouble with swallowing. No N/V endorsed at this time; rectal tube removed 2/2 decreased diarrhea (C. diff negative). No pain reported. Per RN, calorie count had been ordered, but RD not notified about this. Suspect patient is meeting EER at this time, but may continue with jeaneth count to more accurately assess intake. Palliative following at this time to help establish goals of care.     Previous Nutrition Diagnosis:  Increased nutrient needs RT increased demand AEB current clinical state, wound healing     Active [  X ]  Resolved [   ]    If resolved, new PES:     Goal: Pt will meet % of EER per day with good tolerance     Recommendations:  1. Continue current diet order; encourage PO intake, provide assistance where necessary, maintain aspiration precautions at all times   2. Monitor lytes and replete prn.   3. Keep nutrition aligned with GOC at all times    Education: Encouraged intake    Risk Level: High [   ] Moderate [ X  ] Low [   ] Admitting Diagnosis:   Patient is a 89y old  Male who presents with a chief complaint of Toxic Metabolic Encephalopathy, Acute Renal Failure, (05 Sep 2018 06:23)      PAST MEDICAL & SURGICAL HISTORY:  Parkinson's disease  BPH (benign prostatic hyperplasia)  HLD (hyperlipidemia)  HTN (hypertension)  Atrial fibrillation  No significant past surgical history      Current Nutrition Order:  Mechanical soft/nectar thick liquids     PO Intake: Good (%) [ X  ]  Fair (50-75%) [   ] Poor (<25%) [   ]    GI Issues: No N/V/C/D reported; rectal tube removed     Pain: No pain reported     Skin Integrity: L. heel unstageable pressure injury, IAD, L. knee abrasion     Labs:   09-05    137  |  100  |  11  ----------------------------<  107<H>  3.5   |  24  |  1.12    Ca    8.5      05 Sep 2018 06:50  Phos  2.6     09-05  Mg     1.9     09-05      CAPILLARY BLOOD GLUCOSE      POCT Blood Glucose.: 101 mg/dL (05 Sep 2018 06:40)      Medications:  MEDICATIONS  (STANDING):  apixaban 5 milliGRAM(s) Oral every 12 hours  BACItracin   Ointment 1 Application(s) Topical daily  Biotene Dry Mouth Oral Rinse 5 milliLiter(s) Swish and Spit daily  carbidopa/levodopa  25/100 1 Tablet(s) Oral three times a day  chlorhexidine 2% Cloths 1 Application(s) Topical daily  erythromycin   Ointment 1 Application(s) Right EYE daily  finasteride 5 milliGRAM(s) Oral daily  folic acid 1 milliGRAM(s) Oral daily  lactobacillus acidophilus 1 Tablet(s) Oral three times a day with meals  magnesium sulfate  IVPB 1 Gram(s) IV Intermittent once  metoprolol tartrate 75 milliGRAM(s) Oral two times a day  potassium chloride  10 mEq/100 mL IVPB 10 milliEquivalent(s) IV Intermittent every 1 hour  QUEtiapine 12.5 milliGRAM(s) Oral at bedtime  tamsulosin 0.8 milliGRAM(s) Oral at bedtime  vancomycin  IVPB 1500 milliGRAM(s) IV Intermittent once  vitamin A &amp; D Ointment 1 Application(s) Topical daily    MEDICATIONS  (PRN):  acetaminophen  Suppository 650 milliGRAM(s) Rectal every 6 hours PRN For Temp greater than 38 C (100.4 F)      Weight: 85kg  Daily     Daily     Weight Change: No new weights recorded since admit     Estimated energy needs: ABW used for calculations as pt between % of IBW. Needs estimated for maintenance in older adults; adjusted for current clinical status, wound healing   Calories: 25-30 kcal/kg = 5003-4896 kcal/day  Protein: 1.2-1.4 g/kg = 102-119 g protein/day  Fluids: 30-35 mL/kg = 3426-7428 mL/day    Subjective: 89 yo/male with PMHx Parkinsons, Afib, HTN, BPH, EtOH abuse, presented after being found down at home. Found to be in severe sepsis and toxic metabolic encephalopathy possible 2/2 UTI. On 8/28 patient developed rigors, hypotension, afib- transferred to MICU s/p rapid response. Pt now stable on 7 [facility] again. S/p FEES on 8/29, recommending mechanical soft with nectar thick liquid diet, with high aspiration precautions. Pt seen in room, awake, alert, very conversational today. He endorses good appetite and intake, and denies any trouble with swallowing. No N/V endorsed at this time; rectal tube removed 2/2 decreased diarrhea (C. diff negative). No pain reported. Per RN, calorie count had been ordered, but RD not notified about this. Suspect patient is meeting EER at this time, but may continue with jeaneth count to more accurately assess intake. Of note, SLP entered room as this RD was leaving to f/u with patient; will continue to monitor for any new S/S recommendations. Palliative following at this time to help establish goals of care.     Previous Nutrition Diagnosis:  Increased nutrient needs RT increased demand AEB current clinical state, wound healing     Active [  X ]  Resolved [   ]    If resolved, new PES:     Goal: Pt will meet % of EER per day with good tolerance     Recommendations:  1. Continue current diet order; encourage PO intake, provide assistance where necessary, maintain aspiration precautions at all times   *please update diet accordingly pending SLP recommendations  2. Monitor lytes and replete prn.   3. Keep nutrition aligned with GOC at all times    Education: Encouraged intake    Risk Level: High [   ] Moderate [ X  ] Low [   ]

## 2018-09-05 NOTE — PROGRESS NOTE ADULT - PROBLEM SELECTOR PLAN 2
Pt has sputum culture positive for MRSA.  - c/w vanco; f/u vanc levels and redose accordingly (needs 5 day course of therapeutic levels 15-20)  - completed 7 day course of zosyn 9/4 AM  - f/u blood cx, NGTD  - continue to trend CBC    # Hypoxic hypercapnic respiratory failure likely 2/2 PNA  RESOLVED  - Pt found to have PNA w/ increased respiratory needs. ABG showed met acidosis with resp alkalosis  - pt on RA  - c/w pulmonary toilet  - monitor for signs of mucus plugging Pt has sputum culture positive for MRSA.  - c/w vanco for 7 day course  - completed 7 day course of zosyn 9/4 AM  - f/u blood cx, NGTD  - continue to trend CBC    # Hypoxic hypercapnic respiratory failure likely 2/2 PNA  RESOLVED  - Pt found to have PNA w/ increased respiratory needs. ABG showed met acidosis with resp alkalosis  - pt on RA  - c/w pulmonary toilet  - monitor for signs of mucus plugging

## 2018-09-05 NOTE — PROGRESS NOTE ADULT - SUBJECTIVE AND OBJECTIVE BOX
INTERVAL HPI/OVERNIGHT EVENTS:  Patient was seen and examined at bedside. As per nurse and patient, no o/n events, patient resting comfortably. No complaints at this time. Patient denies: fever, chills, dizziness, weakness, HA, Changes in vision, CP, palpitations, SOB, cough, N/V/D/C, dysuria, changes in bowel movements, LE edema. ROS otherwise negative.    VITAL SIGNS:  T(F): 97.3 (18 @ 06:20)  HR: 124 (18 @ 04:05)  BP: 132/92 (18 @ 04:05)  RR: 18 (18 @ 00:35)  SpO2: 95% (18 @ 04:05)  Wt(kg): --    PHYSICAL EXAM:    Constitutional: WDWN, NAD  HEENT: PERRL, EOMI, sclera non-icteric, neck supple, trachea midline, no masses, no JVD, MMM, good dentition  Respiratory: CTA b/l, good air entry b/l, no wheezing, no rhonchi, no rales, without accessory muscle use and no intercostal retractions  Cardiovascular: RRR, normal S1S2, no M/R/G  Gastrointestinal: soft, NTND, no masses palpable, BS normal  Extremities: Warm, well perfused, pulses equal bilateral upper and lower extremities, no edema, no clubbing  Neurological: AAOx3, CN Grossly intact  Skin: Normal temperature, warm, dry    MEDICATIONS  (STANDING):  apixaban 5 milliGRAM(s) Oral every 12 hours  BACItracin   Ointment 1 Application(s) Topical daily  Biotene Dry Mouth Oral Rinse 5 milliLiter(s) Swish and Spit daily  carbidopa/levodopa  25/100 1 Tablet(s) Oral three times a day  chlorhexidine 2% Cloths 1 Application(s) Topical daily  erythromycin   Ointment 1 Application(s) Right EYE daily  finasteride 5 milliGRAM(s) Oral daily  folic acid 1 milliGRAM(s) Oral daily  lactobacillus acidophilus 1 Tablet(s) Oral three times a day with meals  metoprolol tartrate 75 milliGRAM(s) Oral two times a day  QUEtiapine 12.5 milliGRAM(s) Oral at bedtime  tamsulosin 0.8 milliGRAM(s) Oral at bedtime  vitamin A &amp; D Ointment 1 Application(s) Topical daily    MEDICATIONS  (PRN):  acetaminophen  Suppository 650 milliGRAM(s) Rectal every 6 hours PRN For Temp greater than 38 C (100.4 F)      Allergies    aspirin (Unknown)    Intolerances        LABS:                        9.8    6.4   )-----------( 266      ( 04 Sep 2018 06:42 )             30.2     -    139  |  102  |  9   ----------------------------<  102<H>  3.5   |  22  |  1.21    Ca    8.5      04 Sep 2018 06:42  Phos  2.7       Mg     2.0         TPro  6.3  /  Alb  3.0<L>  /  TBili  0.9  /  DBili  x   /  AST  23  /  ALT  <5<L>  /  AlkPhos  65  -      Urinalysis Basic - ( 03 Sep 2018 07:20 )    Color: Yellow / Appearance: Clear / S.025 / pH: x  Gluc: x / Ketone: NEGATIVE  / Bili: Negative / Urobili: 0.2 E.U./dL   Blood: x / Protein: Trace mg/dL / Nitrite: NEGATIVE   Leuk Esterase: NEGATIVE / RBC: Many /HPF / WBC 5-10 /HPF   Sq Epi: x / Non Sq Epi: 0-5 /HPF / Bacteria: Present /HPF        RADIOLOGY & ADDITIONAL TESTS:  Reviewed INTERVAL HPI/OVERNIGHT EVENTS:  Patient was seen and examined at bedside. As per nurse and patient, no o/n events, patient resting comfortably. No complaints at this time. Patient denies: fever, chills, dizziness, weakness, HA, Changes in vision, CP, palpitations, SOB, cough, N/V/D/C, dysuria, changes in bowel movements, LE edema. ROS otherwise negative.    VITAL SIGNS:  T(F): 97.3 (18 @ 06:20)  HR: 124 (18 @ 04:05)  BP: 132/92 (18 @ 04:05)  RR: 18 (18 @ 00:35)  SpO2: 95% (18 @ 04:05)  Wt(kg): --    PHYSICAL EXAM:    Constitutional: elderly male laying in bed in NAD   Respiratory: Mildly decreased breath sounds at LLL base, no wheezing rales or rhonchi, without accessory muscle use and no intercostal retractions  Cardiovascular: Irregular rhythm, normal S1S2, no M/R/G  Gastrointestinal: soft, NTND, no masses palpable, BS normal x4  : Good in situ draining yellow urine, rectal tube in place draining dark loose watery stool. Rectum with erythema & skin breakdown surrounding rectal tube  Extremities: WWP, pulses equal bilateral upper and lower extremities, no edema; L upper arm with deformity 2/2 trauma; L leg continues to be painful to palpation and movement, + left ankle pressure ulcer  Skin: warm, dry, some erythematous areas on face and L knee where previous scabs were located    MEDICATIONS  (STANDING):  apixaban 5 milliGRAM(s) Oral every 12 hours  BACItracin   Ointment 1 Application(s) Topical daily  Biotene Dry Mouth Oral Rinse 5 milliLiter(s) Swish and Spit daily  carbidopa/levodopa  25/100 1 Tablet(s) Oral three times a day  chlorhexidine 2% Cloths 1 Application(s) Topical daily  erythromycin   Ointment 1 Application(s) Right EYE daily  finasteride 5 milliGRAM(s) Oral daily  folic acid 1 milliGRAM(s) Oral daily  lactobacillus acidophilus 1 Tablet(s) Oral three times a day with meals  metoprolol tartrate 75 milliGRAM(s) Oral two times a day  QUEtiapine 12.5 milliGRAM(s) Oral at bedtime  tamsulosin 0.8 milliGRAM(s) Oral at bedtime  vitamin A &amp; D Ointment 1 Application(s) Topical daily    MEDICATIONS  (PRN):  acetaminophen  Suppository 650 milliGRAM(s) Rectal every 6 hours PRN For Temp greater than 38 C (100.4 F)      Allergies    aspirin (Unknown)    Intolerances      LABS:                        9.8    6.4   )-----------( 266      ( 04 Sep 2018 06:42 )             30.2         139  |  102  |  9   ----------------------------<  102<H>  3.5   |  22  |  1.21    Ca    8.5      04 Sep 2018 06:42  Phos  2.7       Mg     2.0         TPro  6.3  /  Alb  3.0<L>  /  TBili  0.9  /  DBili  x   /  AST  23  /  ALT  <5<L>  /  AlkPhos  65        Urinalysis Basic - ( 03 Sep 2018 07:20 )    Color: Yellow / Appearance: Clear / S.025 / pH: x  Gluc: x / Ketone: NEGATIVE  / Bili: Negative / Urobili: 0.2 E.U./dL   Blood: x / Protein: Trace mg/dL / Nitrite: NEGATIVE   Leuk Esterase: NEGATIVE / RBC: Many /HPF / WBC 5-10 /HPF   Sq Epi: x / Non Sq Epi: 0-5 /HPF / Bacteria: Present /HPF        RADIOLOGY & ADDITIONAL TESTS:  Reviewed

## 2018-09-05 NOTE — PROGRESS NOTE BEHAVIORAL HEALTH - SUMMARY
89M h/o EtOH use disorder, Parkinson's, presenting with AMS in setting of fall at home, acute renal failure, UTI, PNA and sepsis. Likely EtOH use prior to admission (BAL 0 on this admission but 165 on 8/3) and patient predisposed to delirium given PD.  Continue Seroquel as below for delirium/agitation as lower risk of EPS than many other antipsychotics; seroquel to be discontinued prior to discharge.

## 2018-09-05 NOTE — PROGRESS NOTE ADULT - PROBLEM SELECTOR PLAN 5
In the setting of dehydration and obstruction from BPH, underlying CKD 3  - Cr increased to 1.21 today  - trend BMP's  - giving some IVF today, will consider stopping pending good PO intake  - f/u vanc level and dose according to level to avoid further kidney injury    #diarrhea- pt w diarrhea. cdiff ruled out. GI PCR neg.  w/ improvements in quantity.   - c/w probiotics  - received 1 dose loperamide 9/1  - continue to monitor  - To remove rectal tube today In the setting of dehydration and obstruction from BPH, underlying CKD 3  - Cr 1.12 today on 9/5  - trend BMP's  - giving some IVF today, will consider stopping pending good PO intake  - f/u vanc level and dose according to level to avoid further kidney injury    #diarrhea- pt w diarrhea. cdiff ruled out. GI PCR neg.  w/ improvements in quantity.   - c/w probiotics  - received 1 dose loperamide 9/1  - continue to monitor

## 2018-09-05 NOTE — PROGRESS NOTE ADULT - PROBLEM SELECTOR PLAN 1
persists.  Suggest more focus on environmental isssues  OOB OOB to chair, reorientation   continue Seroquel 12.5 mg po q 12 hr with q 8 hr prn dosing persists.  Suggest more focus on environmental isssues  OOB OOB to chair, reorientation   continue Seroquel 12.5 mg po q 12 hr with q 8 hr prn dosing    Please consider adding Tylenol perhaps BID for arthritic pain- which may be helpful for his delirium

## 2018-09-05 NOTE — PROGRESS NOTE ADULT - PROBLEM SELECTOR PLAN 6
- c/w home Sinemet    #BPH  - continue home tamsulosin 0.4 mg daily  - continue home finasteride  - failed TOV 9/1. Good replaced; consider TOV again tomorrow

## 2018-09-05 NOTE — PROGRESS NOTE ADULT - PROBLEM SELECTOR PLAN 1
8/27 pt found to have rigors and was ill appearing with tachycardia to 143, temp to 101.4, RR of 30 and oxygen saturation of 80% on RA. CXR showed new NORMA opacity consistent with aspiration PNA. Lactate 2.7 (trended down to 1.8), and leukocytosis to 11.3 with left shift. Pt was fluid resuscitated with 1L bolus NS and fluids continued. Zosyn was started for empiric coverage. Afebrile since 9/2 when had fever 100.4.  - lactate improved  - sputum cx showed MRSA, 1st dose of vanc (8/31)  - leukocytosis 7.1 today, stabilizing, continue to trend  - completed 7 day course of zosyn finished 9/4 AM  - c/w vanco; monitor levels. 5 day course with goal 15-20  - f/u blood cx; NGTD  - pt also received 7 day course Ceftriaxone 1g q24h last dose on 27th for sepsis 2/2 UTI

## 2018-09-05 NOTE — PROGRESS NOTE ADULT - PROBLEM SELECTOR PLAN 7
Support provided to patient and family. Patient to have access to supportive services during rest of hospital stay as the patient/family deemed necessary ie. Chaplaincy, Massage therapy, Music therapy, Patient and family supportive services, Palliative SW, etc.  As discussed during the palliative IDT meeting and as identified during the patients PSSA screening the patient would benefit from pt and  family support for for wife who is very overwhelmed with family obligations and guilt over feelings surrounding her marriage and pt illness    Long counseling session with wife.

## 2018-09-05 NOTE — PROGRESS NOTE BEHAVIORAL HEALTH - NSBHFUPINTERVALHXFT_PSY_A_CORE
Patient seen and interviewed. Patient presented calm, in bed. He continues to be confused about the events leading to his admission and his current treatment. He presented more oriented to time (said October 5th, 2018) but was not able to state where he was.   Patient hasn't had any episodes of agitation during the last 24hrs.

## 2018-09-06 DIAGNOSIS — N40.1 BENIGN PROSTATIC HYPERPLASIA WITH LOWER URINARY TRACT SYMPTOMS: ICD-10-CM

## 2018-09-06 DIAGNOSIS — R41.82 ALTERED MENTAL STATUS, UNSPECIFIED: ICD-10-CM

## 2018-09-06 DIAGNOSIS — I10 ESSENTIAL (PRIMARY) HYPERTENSION: ICD-10-CM

## 2018-09-06 DIAGNOSIS — D64.9 ANEMIA, UNSPECIFIED: ICD-10-CM

## 2018-09-06 LAB
ANION GAP SERPL CALC-SCNC: 15 MMOL/L — SIGNIFICANT CHANGE UP (ref 5–17)
BUN SERPL-MCNC: 11 MG/DL — SIGNIFICANT CHANGE UP (ref 7–23)
CALCIUM SERPL-MCNC: 8.7 MG/DL — SIGNIFICANT CHANGE UP (ref 8.4–10.5)
CHLORIDE SERPL-SCNC: 101 MMOL/L — SIGNIFICANT CHANGE UP (ref 96–108)
CO2 SERPL-SCNC: 23 MMOL/L — SIGNIFICANT CHANGE UP (ref 22–31)
CREAT SERPL-MCNC: 1.26 MG/DL — SIGNIFICANT CHANGE UP (ref 0.5–1.3)
GLUCOSE SERPL-MCNC: 100 MG/DL — HIGH (ref 70–99)
HCT VFR BLD CALC: 30.3 % — LOW (ref 39–50)
HGB BLD-MCNC: 9.7 G/DL — LOW (ref 13–17)
HIV 1+2 AB+HIV1 P24 AG SERPL QL IA: SIGNIFICANT CHANGE UP
MCHC RBC-ENTMCNC: 31.3 PG — SIGNIFICANT CHANGE UP (ref 27–34)
MCHC RBC-ENTMCNC: 32 G/DL — SIGNIFICANT CHANGE UP (ref 32–36)
MCV RBC AUTO: 97.7 FL — SIGNIFICANT CHANGE UP (ref 80–100)
PLATELET # BLD AUTO: 307 K/UL — SIGNIFICANT CHANGE UP (ref 150–400)
POTASSIUM SERPL-MCNC: 3.6 MMOL/L — SIGNIFICANT CHANGE UP (ref 3.5–5.3)
POTASSIUM SERPL-SCNC: 3.6 MMOL/L — SIGNIFICANT CHANGE UP (ref 3.5–5.3)
RBC # BLD: 3.1 M/UL — LOW (ref 4.2–5.8)
RBC # FLD: 13.5 % — SIGNIFICANT CHANGE UP (ref 10.3–16.9)
SODIUM SERPL-SCNC: 139 MMOL/L — SIGNIFICANT CHANGE UP (ref 135–145)
WBC # BLD: 6.3 K/UL — SIGNIFICANT CHANGE UP (ref 3.8–10.5)
WBC # FLD AUTO: 6.3 K/UL — SIGNIFICANT CHANGE UP (ref 3.8–10.5)

## 2018-09-06 PROCEDURE — 99233 SBSQ HOSP IP/OBS HIGH 50: CPT | Mod: GC

## 2018-09-06 PROCEDURE — 99233 SBSQ HOSP IP/OBS HIGH 50: CPT

## 2018-09-06 PROCEDURE — 99231 SBSQ HOSP IP/OBS SF/LOW 25: CPT

## 2018-09-06 RX ORDER — APIXABAN 2.5 MG/1
1 TABLET, FILM COATED ORAL
Qty: 0 | Refills: 0 | COMMUNITY
Start: 2018-09-06

## 2018-09-06 RX ORDER — SALIVA SUBSTITUTE COMB NO.11 351 MG
5 POWDER IN PACKET (EA) MUCOUS MEMBRANE
Qty: 0 | Refills: 0 | COMMUNITY
Start: 2018-09-06

## 2018-09-06 RX ORDER — ACETAMINOPHEN 500 MG
1 TABLET ORAL
Qty: 0 | Refills: 0 | COMMUNITY
Start: 2018-09-06

## 2018-09-06 RX ORDER — FOLIC ACID 0.8 MG
1 TABLET ORAL
Qty: 0 | Refills: 0 | COMMUNITY
Start: 2018-09-06

## 2018-09-06 RX ORDER — METOPROLOL TARTRATE 50 MG
100 TABLET ORAL EVERY 12 HOURS
Qty: 0 | Refills: 0 | Status: DISCONTINUED | OUTPATIENT
Start: 2018-09-06 | End: 2018-09-07

## 2018-09-06 RX ORDER — ERYTHROMYCIN BASE 5 MG/GRAM
1 OINTMENT (GRAM) OPHTHALMIC (EYE)
Qty: 0 | Refills: 0 | COMMUNITY
Start: 2018-09-06

## 2018-09-06 RX ORDER — POTASSIUM CHLORIDE 20 MEQ
10 PACKET (EA) ORAL
Qty: 0 | Refills: 0 | Status: COMPLETED | OUTPATIENT
Start: 2018-09-06 | End: 2018-09-06

## 2018-09-06 RX ORDER — QUETIAPINE FUMARATE 200 MG/1
12.5 TABLET, FILM COATED ORAL
Qty: 0 | Refills: 0 | COMMUNITY
Start: 2018-09-06

## 2018-09-06 RX ORDER — THIAMINE MONONITRATE (VIT B1) 100 MG
100 TABLET ORAL DAILY
Qty: 0 | Refills: 0 | Status: DISCONTINUED | OUTPATIENT
Start: 2018-09-06 | End: 2018-09-07

## 2018-09-06 RX ORDER — THIAMINE MONONITRATE (VIT B1) 100 MG
100 TABLET ORAL DAILY
Qty: 0 | Refills: 0 | Status: DISCONTINUED | OUTPATIENT
Start: 2018-09-06 | End: 2018-09-06

## 2018-09-06 RX ORDER — BACITRACIN ZINC 500 UNIT/G
1 OINTMENT IN PACKET (EA) TOPICAL
Qty: 0 | Refills: 0 | COMMUNITY
Start: 2018-09-06

## 2018-09-06 RX ORDER — WARFARIN SODIUM 2.5 MG/1
1 TABLET ORAL
Qty: 0 | Refills: 0 | COMMUNITY

## 2018-09-06 RX ORDER — CHLORHEXIDINE GLUCONATE 213 G/1000ML
1 SOLUTION TOPICAL
Qty: 0 | Refills: 0 | COMMUNITY
Start: 2018-09-06

## 2018-09-06 RX ORDER — LACTOBACILLUS ACIDOPHILUS 100MM CELL
1 CAPSULE ORAL
Qty: 0 | Refills: 0 | COMMUNITY
Start: 2018-09-06

## 2018-09-06 RX ADMIN — Medication 1 APPLICATION(S): at 11:09

## 2018-09-06 RX ADMIN — Medication 5 MILLILITER(S): at 11:10

## 2018-09-06 RX ADMIN — Medication 1 APPLICATION(S): at 11:10

## 2018-09-06 RX ADMIN — Medication 100 MILLIGRAM(S): at 18:20

## 2018-09-06 RX ADMIN — QUETIAPINE FUMARATE 12.5 MILLIGRAM(S): 200 TABLET, FILM COATED ORAL at 23:25

## 2018-09-06 RX ADMIN — Medication 1 TABLET(S): at 11:10

## 2018-09-06 RX ADMIN — CARBIDOPA AND LEVODOPA 1 TABLET(S): 25; 100 TABLET ORAL at 13:25

## 2018-09-06 RX ADMIN — Medication 1 MILLIGRAM(S): at 11:09

## 2018-09-06 RX ADMIN — TAMSULOSIN HYDROCHLORIDE 0.8 MILLIGRAM(S): 0.4 CAPSULE ORAL at 23:26

## 2018-09-06 RX ADMIN — Medication 100 MILLIEQUIVALENT(S): at 11:10

## 2018-09-06 RX ADMIN — Medication 75 MILLIGRAM(S): at 06:50

## 2018-09-06 RX ADMIN — APIXABAN 5 MILLIGRAM(S): 2.5 TABLET, FILM COATED ORAL at 06:49

## 2018-09-06 RX ADMIN — Medication 1 TABLET(S): at 07:40

## 2018-09-06 RX ADMIN — FINASTERIDE 5 MILLIGRAM(S): 5 TABLET, FILM COATED ORAL at 11:09

## 2018-09-06 RX ADMIN — CARBIDOPA AND LEVODOPA 1 TABLET(S): 25; 100 TABLET ORAL at 23:26

## 2018-09-06 RX ADMIN — Medication 100 MILLIEQUIVALENT(S): at 09:06

## 2018-09-06 RX ADMIN — CHLORHEXIDINE GLUCONATE 1 APPLICATION(S): 213 SOLUTION TOPICAL at 11:30

## 2018-09-06 RX ADMIN — Medication 1 TABLET(S): at 18:20

## 2018-09-06 RX ADMIN — CARBIDOPA AND LEVODOPA 1 TABLET(S): 25; 100 TABLET ORAL at 06:50

## 2018-09-06 RX ADMIN — APIXABAN 5 MILLIGRAM(S): 2.5 TABLET, FILM COATED ORAL at 18:20

## 2018-09-06 RX ADMIN — Medication 100 MILLIEQUIVALENT(S): at 12:27

## 2018-09-06 NOTE — DISCHARGE NOTE ADULT - PATIENT PORTAL LINK FT
You can access the [facility] Patient Portal, offered by [facility], by registering with the following website: [email or web address]

## 2018-09-06 NOTE — DISCHARGE NOTE ADULT - HOSPITAL COURSE
89 y/o M with atrial fibrillation (on Toprol XL and Coumadin on admission), HTN, Parkinson's (on Sinemet), BPH, and ETOH abuse, initially found down at home admitted to 7 [facility] for toxic metabolic encephalopathy and sepsis 2/2 UTI. Course was complicated by NILS and afib with RVR to 160s due to missed toprol doses, resolved with lopressor. Pt was downgraded to [facility]. On [facility] pt continued to be confused. He finished a seven-day course of ceftriaxone for UTI, although organisms were never isolated on blood cultures or urine cultures. SW started planning for [facility].    On 8/28, rapid response was called on [facility]. Pt was found to have NORMA consolidation consistent with aspiration PNA. He was transferred to MICU for observation. Abx coverage changed to zosyn. Pt started on fluids for BP support and was placed on HFNC and was transferred to [facility]. On [facility], sputum cx came back + for MRSA. Pt was placed on isolation and vanc was started (8/30). Pt clinically improved with oxygen requirements decreasing to RA however his mentation remains AAOx1. He completed a 7 day course of zosyn. He was switched to eliquis. Pt also had diarrhea negative for cdiff and GI PCR for which a rectal tube was placed and removed on 9/4. Throughout hospitalization pt has continued to be tachycardic requiring increasing lopressor and he has also had electrolyte imbalances initially with hypernatremia and later hyponatremia. Patient completed 7day course of vanc, last dose 9/5 at midnight. Respiratory status much improved, patient denies SOB/cough, appears more active and conversive. Was deemed stable for discharge to [facility]. This is a 89 yo man with afib (on Coumadin and Toprol XL), Parkinson's, BPH, HTN and ETOH abuse who presented after being found down and admitted for toxic metabolic encephalopathy. On initial presentation, patient met sepsis criteria and was found to have an NILS. He was also in afib with RVR. Patient was treated for UTI with ceftriaxone on medical floor. Subsequently patient developed NORMA aspiration pneumonia and treated with zosyn and vancomycin (last dose 9/5/18). Patient was stepped up to ICU for increased oxygen requirements but was never intubated then stepped to tele unit for monitor. Patient respiratory status improved and was sating in mid 90s without supplemental oxygen. However, patient continued to have altered mental status from baseline per wife, and patient was transferred to medical floor for continued care. Altered mental status was attributed to combination of chronic alcohol use, Parkinson's dementia, with components of hospital associated delirium. Patient was AAOx1 - AAOx2 at time of discharge and was oriented to name and year, and able to carry out full conversations. Patient was also found to be anemic, normocytic, with normal iron and normal %sat so most likely anemia of inflammation. Patient was hemodynamically stable on discharge, with atrial fibrillation controlled with metoprolol tartrate 100mg BID (change from home dose 200mg metoprolol succinate 200mg daily), Coumadin home medication was changed to Eliquis 5mg BID. Good in place secondary to urinary obstruction (patient has a history of urinary retention for 2 years, was previously instructed to self cath but never carried through, three trials of void failed inpatient and most recent Good placed 9/2/18). All other home medications were continued as prior to admission. All questions answered, patient was hemodynamically stable for discharge to sub acute rehab.

## 2018-09-06 NOTE — PROGRESS NOTE ADULT - PROBLEM SELECTOR PLAN 2
- Patient's Hgb has been slowly downtrending since admission when it was WNL  - Will check iron studies for underlying etiology  - Will check B12, folate  - Continue monitoring, hemodynamically stable

## 2018-09-06 NOTE — PROGRESS NOTE ADULT - SUBJECTIVE AND OBJECTIVE BOX
SADIA LUTZ   MRN-[id number]     ([id number]):   CC: persistent delirium   HPI:  89M with Parkinson's (on Sinemet), Afib (Toprol XL and Coumadin), HTN, BPH and ETOH abuse who was BIBEMS after being found down at home. Wife was out of town caring for another sick family member.  He was alone and probably fell 24-48 hour earlier.  On admission was in renal failure and found to have a UTI.  Toxic screen was negative for drugs or alcohol.    Subjective:   Patient's delirium persists but overall appears better Patient seen  today by PT and although still confused, was able to participate a bit  He is calmer, wife at bedside still wearing a posey for his safety  His stories were confabulatory but less so  y.  Rectal tube removed yesterday.  Eating well.  He tells me he has a good appetite.  He also points out that he is building up his strength by coughing an clearing his throat    Wife at bedside and today he can identify her and knows her name    ROS:    Unable to obtain due to: delirium                    Dyspnea (Carissa 0-10):   N              N/V (Y/N):                N              Secretions (Y/N) :    N            Agitation(Y/N):        N  Pain (Y/N):     Patient again reporting some knee pain.  He says its worse when people make him move around    Allergies    aspirin (Unknown)    Intolerances    Opiate Naive (Y/N): yes, med list obtained from patient's pharmacy    Medications:      MEDICATIONS  (STANDING):  apixaban 5 milliGRAM(s) Oral every 12 hours  BACItracin   Ointment 1 Application(s) Topical daily  Biotene Dry Mouth Oral Rinse 5 milliLiter(s) Swish and Spit daily  carbidopa/levodopa  25/100 1 Tablet(s) Oral three times a day  chlorhexidine 2% Cloths 1 Application(s) Topical daily  erythromycin   Ointment 1 Application(s) Right EYE daily  finasteride 5 milliGRAM(s) Oral daily  folic acid 1 milliGRAM(s) Oral daily  lactobacillus acidophilus 1 Tablet(s) Oral three times a day with meals  metoprolol tartrate 75 milliGRAM(s) Oral two times a day  QUEtiapine 12.5 milliGRAM(s) Oral at bedtime  tamsulosin 0.8 milliGRAM(s) Oral at bedtime  vitamin A &amp; D Ointment 1 Application(s) Topical daily    MEDICATIONS  (PRN):  acetaminophen  Suppository 650 milliGRAM(s) Rectal every 6 hours PRN For Temp greater than 38 C (100.4 F)            Labs:                                                                                         9.7    6.3   )-----------( 307      ( 06 Sep 2018 07:40 )             30.3   09-06    139  |  101  |  11  ----------------------------<  100<H>  3.6   |  23  |  1.26    Ca    8.7      06 Sep 2018 07:40  Phos  2.6     09-05  Mg     1.9     -05              Feces: GI PCR neg  C Diff neg    Imaging: no new imaging    PEx:  Vital Signs Last 24 Hrs  T(C): 36.7 (06 Sep 2018 14:06), Max: 37.2 (05 Sep 2018 22:00)  T(F): 98 (06 Sep 2018 14:06), Max: 98.9 (05 Sep 2018 22:00)  HR: 124 (06 Sep 2018 13:35) (96 - 124)  BP: 170/95 (06 Sep 2018 13:35) (144/90 - 170/95)  BP(mean): 124 (06 Sep 2018 13:35) (105 - 124)  RR: 22 (06 Sep 2018 13:35) (18 - 22)  SpO2: 94% (06 Sep 2018 13:35) (94% - 97%)    General: elderly gentleman in bed, confused, pleasant in NAD  mittens on hands  HEENT:  non-icteric sclera, multiple healing  bruises abrasion on chin and both cheeks, dry mucous membranes   Neck: supple no JVD  CVS :irregularly no murmur heard,  tachycardia  Resp: CTA B/L no RRW  GI:  normal BS, non-tender, no rectal tube  :  chan in situ  Musc:   equal strength, no significant wasting, no joint erythema or effusion  Neuro: alert, oriented  to name only, states he was the  p resident, no focal deficits   Psych:   confused, calm and cooperative   Skin: diffuse actinic keratoses and evidence of sun damage, multiple abrasions including both knees  Lymph:no adenopathy  Preadmit Karnofsky: presumed 60-70%           Current Karnofsky:     40%  Cachexia (Y/N): no  BMI: 25.4    Advanced Directives:     DNR/DNI     MOLST       Decision maker: Patient presently not able to make complex medical decisions  Legal surrogate: wife Angle Lutz            Documentation of GOC: to recover from this event, to go to rehab.  Wife presently overwhelmed regarding family illness  Hoping for improvement in strength and mental status.               REFERRALS	       Palliative Med               PT/OT

## 2018-09-06 NOTE — DISCHARGE NOTE ADULT - MEDICATION SUMMARY - MEDICATIONS TO STOP TAKING
I will STOP taking the medications listed below when I get home from the hospital:    Coumadin 5 mg oral tablet  -- 1 tab(s) by mouth once a day (at bedtime) I will STOP taking the medications listed below when I get home from the hospital:    Coumadin 5 mg oral tablet  -- 1 tab(s) by mouth once a day (at bedtime)    Metoprolol Succinate  mg oral tablet, extended release  -- 1 tab(s) by mouth once a day

## 2018-09-06 NOTE — PROGRESS NOTE ADULT - PROBLEM SELECTOR PLAN 2
Pt has sputum culture positive for MRSA.  - c/w vanco for 7 day course  - completed 7 day course of zosyn 9/4 AM  - f/u blood cx, NGTD  - continue to trend CBC    # Hypoxic hypercapnic respiratory failure likely 2/2 PNA  RESOLVED  - Pt found to have PNA w/ increased respiratory needs. ABG showed met acidosis with resp alkalosis  - pt on RA  - c/w pulmonary toilet  - monitor for signs of mucus plugging Pt has sputum culture positive for MRSA.  - c/w vanco for 7 day course  - completed 7 day course of zosyn 9/4 AM  - completed 7 day course of vanc 9/5  - f/u blood cx, NGTD  - continue to trend CBC    # Hypoxic hypercapnic respiratory failure likely 2/2 PNA  RESOLVED  - Pt found to have PNA w/ increased respiratory needs. ABG showed met acidosis with resp alkalosis  - pt on RA  - c/w pulmonary toilet  - monitor for signs of mucus plugging

## 2018-09-06 NOTE — DISCHARGE NOTE ADULT - NS AS ACTIVITY OBS
Please advance your activity as tolerated. Walking-Outdoors allowed/Please advance your activity as tolerated./Bathing allowed/Showering allowed/Walking-Indoors allowed

## 2018-09-06 NOTE — PROGRESS NOTE ADULT - PROBLEM SELECTOR PLAN 8
Transition of care performed with sharing of clinical summary.  Plan: 1) PCP Contacted on Admission: (Y/N) --> Name & Phone #: Dr. Reji Paulino [phone]  2) Date of Contact with PCP: 8/21/2018  3) PCP Contacted at Discharge: (Y/N)  4) Summary of Handoff Given to PCP:   5) Post-Discharge Appointment Date and Location:

## 2018-09-06 NOTE — DISCHARGE NOTE ADULT - MEDICATION SUMMARY - MEDICATIONS TO TAKE
I will START or STAY ON the medications listed below when I get home from the hospital:    finasteride 5 mg oral tablet  -- 1 tab(s) by mouth once a day  -- Indication: For Benign prostatic hyperplasia, unspecified whether lower urinary tract symptoms present    acetaminophen 650 mg rectal suppository  -- 1 suppository(ies) rectally every 6 hours, As needed, For Temp greater than 38 C (100.4 F)  -- Indication: For Fever    tamsulosin 0.4 mg oral capsule  -- 2 cap(s) by mouth once a day (at bedtime)  -- Indication: For Benign prostatic hyperplasia, unspecified whether lower urinary tract symptoms present    apixaban 5 mg oral tablet  -- 1 tab(s) by mouth every 12 hours  -- Indication: For Chronic atrial fibrillation    Lotrel 5 mg-40 mg oral capsule  -- 1 cap(s) by mouth once a day  -- Indication: For Hypertension    Sinemet 25 mg-100 mg oral tablet  -- 1 tab(s) by mouth 3 times a day  -- Indication: For Parkinson's disease    QUEtiapine  -- 12.5 milligram(s) by mouth once a day (at bedtime)  -- Indication: For Parkinson's disease    chlorhexidine 2% topical pad  -- 1 application on skin once a day  -- Indication: For Skin ulcer    Metoprolol Succinate  mg oral tablet, extended release  -- 1 tab(s) by mouth once a day  -- Indication: For Hypertension    bacitracin 500 units/g topical ointment  -- 1 application on skin once a day  -- Indication: For Skin ulcer    vitamin A & D topical ointment  -- 1 application on skin once a day  -- Indication: For Skin ulcer    saliva substitutes oral solution  -- 5 milliliter(s) by mouth once a day  -- Indication: For Oral hygeine    erythromycin 0.5% ophthalmic ointment  -- 1 application to each affected eye once a day  -- Indication: For Conjunctivitis    lactobacillus acidophilus oral capsule  -- 1 tab(s) by mouth 3 times a day with meals  -- Indication: For Probiotics    folic acid 1 mg oral tablet  -- 1 tab(s) by mouth once a day  -- Indication: For Vitamins I will START or STAY ON the medications listed below when I get home from the hospital:    finasteride 5 mg oral tablet  -- 1 tab(s) by mouth once a day  -- Indication: For Benign prostatic hyperplasia, unspecified whether lower urinary tract symptoms present    tamsulosin 0.4 mg oral capsule  -- 2 cap(s) by mouth once a day (at bedtime)  -- Indication: For Benign prostatic hyperplasia, unspecified whether lower urinary tract symptoms present    apixaban 5 mg oral tablet  -- 1 tab(s) by mouth every 12 hours  -- Indication: For Chronic atrial fibrillation    Lotrel 5 mg-40 mg oral capsule  -- 1 cap(s) by mouth once a day  -- Indication: For Hypertension    Sinemet 25 mg-100 mg oral tablet  -- 1 tab(s) by mouth 3 times a day  -- Indication: For Parkinson's disease    QUEtiapine  -- 12.5 milligram(s) by mouth once a day (at bedtime)  -- Indication: For Parkinson's disease    chlorhexidine 2% topical pad  -- 1 application on skin once a day  -- Indication: For Skin ulcer    metoprolol tartrate 100 mg oral tablet  -- 1 tab(s) by mouth every 12 hours  -- Indication: For Atrial fibrillation    bacitracin 500 units/g topical ointment  -- 1 application on skin once a day  -- Indication: For Skin ulcer    vitamin A & D topical ointment  -- 1 application on skin once a day  -- Indication: For Skin ulcer    saliva substitutes oral solution  -- 5 milliliter(s) by mouth once a day  -- Indication: For Oral hygeine    erythromycin 0.5% ophthalmic ointment  -- 1 application to each affected eye once a day  -- Indication: For Conjunctivitis    lactobacillus acidophilus oral capsule  -- 1 tab(s) by mouth 3 times a day with meals  -- Indication: For Probiotics    Daily Multiple Vitamins oral tablet  -- 1 tab(s) by mouth once a day  -- Indication: For Vitamins    Vitamin B1 100 mg oral tablet  -- 1 tab(s) by mouth once a day  -- Indication: For Vitamins    folic acid 1 mg oral tablet  -- 1 tab(s) by mouth once a day  -- Indication: For Vitamins

## 2018-09-06 NOTE — PROGRESS NOTE ADULT - PROBLEM SELECTOR PLAN 5
In the setting of dehydration and obstruction from BPH, underlying CKD 3  - Cr 1.12 today on 9/5  - trend BMP's  - giving some IVF today, will consider stopping pending good PO intake  - f/u vanc level and dose according to level to avoid further kidney injury    #diarrhea- pt w diarrhea. cdiff ruled out. GI PCR neg.  w/ improvements in quantity.   - c/w probiotics  - received 1 dose loperamide 9/1  - continue to monitor In the setting of dehydration and obstruction from BPH, underlying CKD 3  - trend BMP's  - giving some IVF today, will consider stopping pending good PO intake  - f/u vanc level and dose according to level to avoid further kidney injury    #diarrhea- pt w diarrhea. cdiff ruled out. GI PCR neg.  w/ improvements in quantity.   - c/w probiotics  - received 1 dose loperamide 9/1  - continue to monitor

## 2018-09-06 NOTE — DISCHARGE NOTE ADULT - ADDITIONAL INSTRUCTIONS
Please follow-up with your primary care physician, Dr. Reji Paulino, at your earliest convenience after sub-acute rehabilitation. Please follow up with Dr. Reji Paulino's office on Monday, September 17th,2018 at 11:15 AM.

## 2018-09-06 NOTE — PROGRESS NOTE ADULT - SUBJECTIVE AND OBJECTIVE BOX
TRANSFER ACCEPT NOTE [facility] to [facility]    Hospital Course:  This is a 89 yo man with afib (on Coumadin and Toprol XL), ETOH abuse, Parkinson's, BPH, and ETOH abuse who presented after being found down and admitted for toxic metabolic encephalopathy. On initial presentation, patient met sepsis criteria and was found to have an NILS. He was also in afib with RVR. Patient was treated for UTI with ceftriaxone on F. Subsequently patient developed NORMA aspiration pneumonia and treated with zosyn and vancmycin (last dose 9/5/18). Patient was stepped up to [facility] from [facility] for increased oxygen requirements. Patient respiratory status imrpoved and was sating in mid 90s on RA. However, patient continued to have altered mental status from baseline per wife, and patient was transferred to [facility] for further evaluation of mental status etiologies.    SUBJECTIVE/INTERVAL HPI: Patient seen and examined at bedside. He reports some arthritic pains throughout his body but cannot signify which joints. He denies chest pain, shortness of breath, but continues to have cough productive of sputum. He denies muscle or extremity weakness. He denies pain with urination, denies abdominal pain.    Social history: Drank 5 shots of vodka daily for 5 years.    VITAL SIGNS:  Vital Signs Last 24 Hrs  T(C): 36.7 (06 Sep 2018 14:06), Max: 37.2 (05 Sep 2018 22:00)  T(F): 98 (06 Sep 2018 14:06), Max: 98.9 (05 Sep 2018 22:00)  HR: 124 (06 Sep 2018 13:35) (96 - 124)  BP: 170/95 (06 Sep 2018 13:35) (144/90 - 170/95)  BP(mean): 124 (06 Sep 2018 13:35) (105 - 128)  RR: 22 (06 Sep 2018 13:35) (18 - 22)  SpO2: 94% (06 Sep 2018 13:35) (94% - 97%)    PHYSICAL EXAM:    General: Elderly man seen lying in bed in no acute distress  HEENT: NC/AT; PERRL, anicteric sclera; dry mucus membranes  Neck: supple, no JVD  Cardiovascular: irregularly irregular on palpation, tachycardic. +S1/S2, no murmurs, rubs, gallops  Respiratory: Bibasilar inspiratory crackles at lower lung bases to 1/3 of the way up the lungs, no wheezing  Gastrointestinal: soft abdomen, no rebound, NT/ND; +BSx4  Extremities: WWP; no edema, clubbing or cyanosis  Vascular: 2+ radial and pedal pulses  Neurological: AAOx1-2 (name and year); no focal deficits. 4+/5 lower extremity strength, full ROM. 5/5 upper extremity strength. No cogwheeling. Pill-rolling tremor noted    MEDICATIONS:  MEDICATIONS  (STANDING):  apixaban 5 milliGRAM(s) Oral every 12 hours  BACItracin   Ointment 1 Application(s) Topical daily  Biotene Dry Mouth Oral Rinse 5 milliLiter(s) Swish and Spit daily  carbidopa/levodopa  25/100 1 Tablet(s) Oral three times a day  chlorhexidine 2% Cloths 1 Application(s) Topical daily  erythromycin   Ointment 1 Application(s) Right EYE daily  finasteride 5 milliGRAM(s) Oral daily  folic acid 1 milliGRAM(s) Oral daily  lactobacillus acidophilus 1 Tablet(s) Oral three times a day with meals  metoprolol tartrate 75 milliGRAM(s) Oral two times a day  QUEtiapine 12.5 milliGRAM(s) Oral at bedtime  tamsulosin 0.8 milliGRAM(s) Oral at bedtime  vitamin A &amp; D Ointment 1 Application(s) Topical daily    MEDICATIONS  (PRN):  acetaminophen  Suppository 650 milliGRAM(s) Rectal every 6 hours PRN For Temp greater than 38 C (100.4 F)    ALLERGIES:  Allergies  aspirin (Unknown)    Intolerances    LABS:                        9.7    6.3   )-----------( 307      ( 06 Sep 2018 07:40 )             30.3     09-06    139  |  101  |  11  ----------------------------<  100<H>  3.6   |  23  |  1.26    Ca    8.7      06 Sep 2018 07:40  Phos  2.6     09-05  Mg     1.9     09-05    POCT Blood Glucose.: 101 mg/dL (05 Sep 2018 06:40)    RADIOLOGY & ADDITIONAL TESTS: Reviewed.    EXAM:  XR CHEST PORTABLE URGENT 1V                          PROCEDURE DATE:  09/05/2018          INTERPRETATION:  CLINICAL INDICATION: 89-year-old with history of fall.   Pneumonia.      IMPRESSION: Frontal view of the chest is compared to 9/4/2018 and   demonstrates slight interval progression of now severe pulmonary edema.   Left basilar consolidation. Diminished now moderate layering left pleural effusion. Persistent small layering right pleural effusion. Cardiomegaly. Low lung volumes. TRANSFER ACCEPT NOTE [facility] to [facility]    Hospital Course:  This is a 89 yo man with afib (on Coumadin and Toprol XL), Parkinson's, BPH, HTN and ETOH abuse who presented after being found down and admitted for toxic metabolic encephalopathy. On initial presentation, patient met sepsis criteria and was found to have an NILS. He was also in afib with RVR. Patient was treated for UTI with ceftriaxone on F. Subsequently patient developed NORMA aspiration pneumonia and treated with zosyn and vancmycin (last dose 9/5/18). Patient was stepped up to [facility] from [facility] for increased oxygen requirements. Patient respiratory status imrpoved and was sating in mid 90s on RA. However, patient continued to have altered mental status from baseline per wife, and patient was transferred to [facility] for further evaluation of mental status etiologies.    SUBJECTIVE/INTERVAL HPI: Patient seen and examined at bedside. He reports some arthritic pains throughout his body but cannot signify which joints. He denies chest pain, shortness of breath, but continues to have cough productive of sputum. He denies muscle or extremity weakness. He denies pain with urination, denies abdominal pain.    Social history: Drank 5 shots of vodka daily for 5 years.    VITAL SIGNS:  Vital Signs Last 24 Hrs  T(C): 36.7 (06 Sep 2018 14:06), Max: 37.2 (05 Sep 2018 22:00)  T(F): 98 (06 Sep 2018 14:06), Max: 98.9 (05 Sep 2018 22:00)  HR: 124 (06 Sep 2018 13:35) (96 - 124)  BP: 170/95 (06 Sep 2018 13:35) (144/90 - 170/95)  BP(mean): 124 (06 Sep 2018 13:35) (105 - 128)  RR: 22 (06 Sep 2018 13:35) (18 - 22)  SpO2: 94% (06 Sep 2018 13:35) (94% - 97%)    PHYSICAL EXAM:    General: Elderly man seen lying in bed in no acute distress  HEENT: NC/AT; PERRL, anicteric sclera; dry mucus membranes  Neck: supple, no JVD  Cardiovascular: irregularly irregular on palpation, tachycardic. +S1/S2, no murmurs, rubs, gallops  Respiratory: Bibasilar inspiratory crackles at lower lung bases to 1/3 of the way up the lungs, no wheezing  Gastrointestinal: soft abdomen, no rebound, NT/ND; +BSx4  Extremities: WWP; no edema, clubbing or cyanosis  Vascular: 2+ radial and pedal pulses  Neurological: AAOx1-2 (name and year); no focal deficits. 4+/5 lower extremity strength, full ROM. 5/5 upper extremity strength. No cogwheeling. Pill-rolling tremor noted    MEDICATIONS:  MEDICATIONS  (STANDING):  apixaban 5 milliGRAM(s) Oral every 12 hours  BACItracin   Ointment 1 Application(s) Topical daily  Biotene Dry Mouth Oral Rinse 5 milliLiter(s) Swish and Spit daily  carbidopa/levodopa  25/100 1 Tablet(s) Oral three times a day  chlorhexidine 2% Cloths 1 Application(s) Topical daily  erythromycin   Ointment 1 Application(s) Right EYE daily  finasteride 5 milliGRAM(s) Oral daily  folic acid 1 milliGRAM(s) Oral daily  lactobacillus acidophilus 1 Tablet(s) Oral three times a day with meals  metoprolol tartrate 75 milliGRAM(s) Oral two times a day  QUEtiapine 12.5 milliGRAM(s) Oral at bedtime  tamsulosin 0.8 milliGRAM(s) Oral at bedtime  vitamin A &amp; D Ointment 1 Application(s) Topical daily    MEDICATIONS  (PRN):  acetaminophen  Suppository 650 milliGRAM(s) Rectal every 6 hours PRN For Temp greater than 38 C (100.4 F)    ALLERGIES:  Allergies  aspirin (Unknown)    Intolerances    LABS:                        9.7    6.3   )-----------( 307      ( 06 Sep 2018 07:40 )             30.3     09-06    139  |  101  |  11  ----------------------------<  100<H>  3.6   |  23  |  1.26    Ca    8.7      06 Sep 2018 07:40  Phos  2.6     09-05  Mg     1.9     09-05    POCT Blood Glucose.: 101 mg/dL (05 Sep 2018 06:40)    RADIOLOGY & ADDITIONAL TESTS: Reviewed.    EXAM:  XR CHEST PORTABLE URGENT 1V                          PROCEDURE DATE:  09/05/2018          INTERPRETATION:  CLINICAL INDICATION: 89-year-old with history of fall.   Pneumonia.      IMPRESSION: Frontal view of the chest is compared to 9/4/2018 and   demonstrates slight interval progression of now severe pulmonary edema.   Left basilar consolidation. Diminished now moderate layering left pleural effusion. Persistent small layering right pleural effusion. Cardiomegaly. Low lung volumes.

## 2018-09-06 NOTE — DISCHARGE NOTE ADULT - CARE PLAN
Principal Discharge DX:	Sepsis due to pneumonia  Goal:	Clear pulmonary infection  Assessment and plan of treatment:	You were found to have a high heart rate, high breathing rate, and a fever when you arrived at the hospital. Chest X-ray revealed pneumonia, which is an infection of your lungs. You received antibiotics during your stay for empiric treatment of your infection. The bacteria that was grew from your sputum was MRSA (methicillin-resistant staph aureus). Your breathing has greatly improved, and are deemed stable for discharge to sub-acute rehab.  Secondary Diagnosis:	Chronic atrial fibrillation  Goal:	Limit disease progression  Assessment and plan of treatment:	Please stop taking coumadin and continue taking your eliquis for management of your atrial fibrillation. Please follow-up with your primary care physician at your earliest convenience.  Secondary Diagnosis:	HTN (hypertension)  Goal:	Limit disease progression  Assessment and plan of treatment:	Please continue taking your antihypertensive medications for management of your high blood pressure. Please follow-up with your primary care physician at your earliest convenience.  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 Please follow-up with your primary care physician at your earliest convenience. Principal Discharge DX:	Sepsis due to pneumonia  Goal:	Clear pulmonary infection  Assessment and plan of treatment:	You were found to have a high heart rate, high breathing rate, and a fever when you arrived at the hospital. Chest X-ray revealed pneumonia, which is an infection of your lungs. You received antibiotics during your stay for empiric treatment of your infection. The bacteria that was grew from your sputum was MRSA (methicillin-resistant staph aureus). Your breathing has greatly improved, and are deemed stable for discharge to sub-acute rehab. Your lung infection has resolved after treatment with antibiotics.  Secondary Diagnosis:	Chronic atrial fibrillation  Goal:	Limit disease progression  Assessment and plan of treatment:	Please stop taking coumadin and continue taking your eliquis for management of your atrial fibrillation. Your metoprolol was changed from metoprolol succinate 200mg daily (which is a long acting medication) to metoprolol tartrate 100mg twice a day, which is a short acting medication. Your primary care doctor should address this at your follow up appointment and chose the correct medication for your long term atrial fibrillation management.  Secondary Diagnosis:	HTN (hypertension)  Assessment and plan of treatment:	Please continue taking your antihypertensive medications for management of your high blood pressure. Please follow-up with your primary care physician at your appointment.  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 Please follow-up with your primary care physician at your appointment.

## 2018-09-06 NOTE — DISCHARGE NOTE ADULT - OTHER SIGNIFICANT FINDINGS
HOSPITAL COURSE  This is a 89 yo man with afib (on Coumadin and Toprol XL), Parkinson's, BPH, HTN and ETOH abuse who presented after being found down and admitted for toxic metabolic encephalopathy. On initial presentation, patient met sepsis criteria and was found to have an NILS. He was also in afib with RVR. Patient was treated for UTI with ceftriaxone on medical floor. Subsequently patient developed NORMA aspiration pneumonia and treated with zosyn and vancomycin (last dose 9/5/18). Patient was stepped up to ICU for increased oxygen requirements but was never intubated then stepped to tele unit for monitor. Patient respiratory status improved and was sating in mid 90s without supplemental oxygen. However, patient continued to have altered mental status from baseline per wife, and patient was transferred to medical floor for continued care. Altered mental status was attributed to combination of chronic alcohol use, Parkinson's dementia, with components of hospital associated delirium. Patient was AAOx1 - AAOx2 at time of discharge and was oriented to name and year, and able to carry out full conversations. Patient was also found to be anemic, normocytic, with normal iron and normal %sat so most likely anemia of inflammation. Patient was hemodynamically stable on discharge, with atrial fibrillation controlled with metoprolol tartrate 100mg BID (change from home dose 200mg metoprolol succinate 200mg daily), Coumadin home medication was changed to Eliquis 5mg BID. Good in place secondary to urinary obstruction (patient has a history of urinary retention for 2 years, was previously instructed to self cath but never carried through, three trials of void failed inpatient and most recent Good placed 9/2/18). All other home medications were continued as prior to admission. All questions answered, patient was hemodynamically stable for discharge to sub acute rehab.

## 2018-09-06 NOTE — PROGRESS NOTE ADULT - ATTENDING COMMENTS
Patient seen and examined at bedside. Agree with exam, a/p as above with following addendum:    Patient feels well, no complaints. Still confused, thinks he is out of [facility]. Knows year is 2018. No appetite.     A/P: 89 year old M w/     1. TME: likely 2/2 ICU delirium, baseline dementia, sepsis, c/w thiamine for possible Wernicke's  2. HAP: s/p Vanc/Zosyn  3. Atrial fib: HR still 120s, increase lopressor to 100 mg BID  4. Parkinson's: c/w Sinemet  5. BPH: c/w chan, flomax, finasteride  6. Anemia: f/u iron, folate, b12, likely from prolonged hospital course and phlebotomy  7. EtOH: c/w thiamine, folate, MV  8. Dispo: [facility], f/u palliative recs

## 2018-09-06 NOTE — PROGRESS NOTE BEHAVIORAL HEALTH - SUMMARY
89M h/o EtOH use disorder, Parkinson's, presenting with AMS in setting of fall at home, acute renal failure, UTI, PNA and sepsis. Likely EtOH use prior to admission (BAL 0 on this admission but 165 on 8/3) and patient predisposed to delirium given PD. Patient also presents with symptoms consistent with Wernicke-Korsakoff syndrome - confabulations, amnesia.   Continue Seroquel as below for delirium/agitation as lower risk of EPS than many other antipsychotics; seroquel to be discontinued prior to discharge.

## 2018-09-06 NOTE — PROGRESS NOTE ADULT - PROBLEM SELECTOR PLAN 1
8/27 pt found to have rigors and was ill appearing with tachycardia to 143, temp to 101.4, RR of 30 and oxygen saturation of 80% on RA. CXR showed new NORMA opacity consistent with aspiration PNA. Lactate 2.7 (trended down to 1.8), and leukocytosis to 11.3 with left shift. Pt was fluid resuscitated with 1L bolus NS and fluids continued. Zosyn was started for empiric coverage. Afebrile since 9/2 when had fever 100.4.  - lactate improved  - sputum cx showed MRSA, 1st dose of vanc (8/31)  - leukocytosis 7.1 today, stabilizing, continue to trend  - completed 7 day course of zosyn finished 9/4 AM  - c/w vanco; monitor levels. 5 day course with goal 15-20  - f/u blood cx; NGTD  - pt also received 7 day course Ceftriaxone 1g q24h last dose on 27th for sepsis 2/2 UTI 8/27 pt found to have rigors and was ill appearing with tachycardia to 143, temp to 101.4, RR of 30 and oxygen saturation of 80% on RA. CXR showed new NORMA opacity consistent with aspiration PNA. Lactate 2.7 (trended down to 1.8), and leukocytosis to 11.3 with left shift. Pt was fluid resuscitated with 1L bolus NS and fluids continued. Zosyn was started for empiric coverage. Afebrile since 9/2 when had fever 100.4.  - lactate improved  - sputum cx showed MRSA, 1st dose of vanc (8/31)  - leukocytosis 7.1 today, stabilizing, continue to trend  - completed 7 day course of zosyn finished 9/4 AM  - Compelted 7 day course of vancomycin finished 9/5  - c/w vanco; monitor levels. 5 day course with goal 15-20  - f/u blood cx; NGTD  - pt also received 7 day course Ceftriaxone 1g q24h last dose on 27th for sepsis 2/2 UTI

## 2018-09-06 NOTE — PROGRESS NOTE ADULT - PROBLEM SELECTOR PLAN 3
- Continue home sinemet, may be contributing to dementia vs delirium as above - Increased metoprolol tartrate from 75mg BID to 100mg BID  - Continue apixaban 5mg q12h  - Continue monitoring for appropriate rate control

## 2018-09-06 NOTE — PROGRESS NOTE ADULT - PROBLEM SELECTOR PLAN 4
- Continue metoprolol tartrate 75mg    #BPH  - Continue home finasteride and Flomax - Patient had Good inserted for urinary retention and has failed TOV x3, most recently 9/2/2018. Will consider additional TOV vs plan for discharge with indwelling catheter and outpatient management  - No s/s of acute urinary infections and completed course of treatment with ceftriaxone 8/27/18 for complicated UTI leading to sepsis

## 2018-09-06 NOTE — PROGRESS NOTE BEHAVIORAL HEALTH - NSBHFUPINTERVALHXFT_PSY_A_CORE
Patient seen and interviewed. He presents alert, in bed, with mittens on both hands. He was able to state that he is in a hospital and read the date on the whiteboard. Patient seen and interviewed. He presents alert, in bed, with mittens on both hands. He was able to state that he is in a hospital and read the date on the whiteboard. He acknowledges that it has been difficult to remember certain facts. He is able to give his wife's name. He continues confabulate but is more goal directed.

## 2018-09-06 NOTE — PROGRESS NOTE ADULT - PROBLEM SELECTOR PLAN 1
persists but is better today  Suggest more focus on environmental isssues  OOB OOB to chair, reorientation   continue Seroquel 12.5 mg po q 12 hr with q 8 hr prn dosing    Please consider adding Tylenol perhaps BID for arthritic pain- which may be helpful for his delirium    Agree with transfer to regional floor.  Would try off posey and see if he can go to rehab tomorrow

## 2018-09-06 NOTE — PROGRESS NOTE ADULT - PROBLEM SELECTOR PLAN 4
Pt has h/o of a-fib on home Coumadin and Toprol XL 200mg. Was difficult to rate control earlier this admission likely 2/2 missed doses of Toprol XL  - on lopressor for rate control. Pt has been tachycardic 90s-120s  - c/w lopressor 75 BID (may increase if pt continues to be tachy)  - INR <2 9/1. c/w Eliquis for further anticoagulation  - if monitor says pt is tachycardic, palpate HR for 1 minute for more accurate rate

## 2018-09-06 NOTE — DISCHARGE NOTE ADULT - PROVIDER TOKENS
FREE:[LAST:[Lora],FIRST:[Reji],PHONE:[phone]],FAX:[phone]    -],ADDRESS:[[address] [place] [address]]]

## 2018-09-06 NOTE — PROGRESS NOTE ADULT - PROBLEM SELECTOR PLAN 5
- Continue metoprolol tartrate 75mg BID home medication  - Continue apixaban 5mg q12h  - Continue monitoring for appropriate rate control - Continue home sinemet, may be contributing to dementia vs delirium as above

## 2018-09-06 NOTE — DISCHARGE NOTE ADULT - PLAN OF CARE
Clear pulmonary infection You were found to have a high heart rate, high breathing rate, and a fever when you arrived at the hospital. Chest X-ray revealed pneumonia, which is an infection of your lungs. You received antibiotics during your stay for empiric treatment of your infection. The bacteria that was grew from your sputum was MRSA (methicillin-resistant staph aureus). Your breathing has greatly improved, and are deemed stable for discharge to sub-acute rehab. Limit disease progression Please stop taking coumadin and continue taking your eliquis for management of your atrial fibrillation. Please follow-up with your primary care physician at your earliest convenience. Please continue taking your antihypertensive medications for management of your high blood pressure. Please follow-up with your primary care physician at your earliest convenience. Please continue taking your sinemet and quetiapine for management of your Parkinson's disease and Parkinson's related dementia. Please follow-up with your primary care physician at your earliest convenience. You were found to have a high heart rate, high breathing rate, and a fever when you arrived at the hospital. Chest X-ray revealed pneumonia, which is an infection of your lungs. You received antibiotics during your stay for empiric treatment of your infection. The bacteria that was grew from your sputum was MRSA (methicillin-resistant staph aureus). Your breathing has greatly improved, and are deemed stable for discharge to sub-acute rehab. Your lung infection has resolved after treatment with antibiotics. Please stop taking coumadin and continue taking your eliquis for management of your atrial fibrillation. Your metoprolol was changed from metoprolol succinate 200mg daily (which is a long acting medication) to metoprolol tartrate 100mg twice a day, which is a short acting medication. Your primary care doctor should address this at your follow up appointment and chose the correct medication for your long term atrial fibrillation management. Please continue taking your antihypertensive medications for management of your high blood pressure. Please follow-up with your primary care physician at your appointment. Please continue taking your sinemet and quetiapine for management of your Parkinson's disease and Parkinson's related dementia. Please follow-up with your primary care physician at your appointment.

## 2018-09-06 NOTE — PROGRESS NOTE ADULT - ASSESSMENT
89 yo with Parkinsons found face down at home following a fall 24-48 hours previously.  Dehydrated hypotensive, renal failure.  Perhaps some role of alcohol although screen was negative.  Renal failure had improved but patient reemained confused.  Most recently with likely aspiration and sepsis transferred to and medically is  doing better however delirium persists, although today finally seems a bit better

## 2018-09-06 NOTE — DISCHARGE NOTE ADULT - REASON FOR ADMISSION
Toxic Metabolic Encephalopathy, Acute Renal Failure, Toxic Metabolic Encephalopathy, Acute Renal Failure

## 2018-09-06 NOTE — PROGRESS NOTE ADULT - PROBLEM SELECTOR PLAN 7
Transition of care performed with sharing of clinical summary.  Plan: 1) PCP Contacted on Admission: (Y/N) --> Name & Phone #:  2) Date of Contact with PCP:  3) PCP Contacted at Discharge: (Y/N)  4) Summary of Handoff Given to PCP:   5) Post-Discharge Appointment Date and Location: Transition of care performed with sharing of clinical summary.  Plan: 1) PCP Contacted on Admission: (Y/N) --> Name & Phone #: Dr. Reji Paulino [phone]  2) Date of Contact with PCP: 8/21/2018  3) PCP Contacted at Discharge: (Y/N)  4) Summary of Handoff Given to PCP:   5) Post-Discharge Appointment Date and Location: F: No IVF needed  E: Replete PRN  N: Mechanical soft with nectar thin liquids, aspiration precautions  Prophy: Eliquis  DNR/DNI.  Dispo: Continue workup for AMS on RMF
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
## 2018-09-06 NOTE — PROGRESS NOTE ADULT - ASSESSMENT
89 y/o M with hx of Parkinson disease, HTN, BPH, ETOH abuse, afib on Coumadin, found down at home for unknown period of time (up to 2 days) admitted for toxic metabolic encephalopathy and acute renal failure and found to have UTI s/p 7 day tx w/ ceftriaxone now with NORMA PNA. S/p zosyn 8/30-9/4. Vanc 1 dose on 8/28, 8/30-9/5 for 7 day tx course 89 y/o M with hx of Parkinson disease, HTN, BPH, ETOH abuse, afib on Coumadin, found down at home for unknown period of time (up to 2 days) admitted for toxic metabolic encephalopathy and acute renal failure and found to have UTI s/p 7 day tx w/ ceftriaxone now with NORMA PNA. S/p zosyn 8/30-9/4. Vanc 1 dose on 8/28, 8/30-9/5 for 7 day tx course.

## 2018-09-06 NOTE — PROGRESS NOTE ADULT - ASSESSMENT
This is a 89 yo man with history of afib, Parkinson's disease, HTN, BPH, and ETOH abuse who presented after being found down, found to have sepsis secondary to complicated UTI. He was admitted for treatment and developed aspiration pneumonia, for which he was treated, but despite treatment continued to have altered mental status compared with baseline.

## 2018-09-06 NOTE — PROGRESS NOTE ADULT - PROBLEM SELECTOR PLAN 1
- Patient presented with sepsis 2/2 to UTI and treated. Also developed aspiration pneumonia for which he was treated for HAP/aspiration pneumonia  - Continues to be AAOx1- AAOx2. Per wife this is not his baseline  - Most likely multifactorial in the setting of hospital associated delirium with contribution from underlying parkinson's disease and possible long term dementia vs long term alcohol use vs other underlying medical etiology  - Psych consulted, patient started on seroquel 12.5 ghs and q8hrs PRN agitation  - TSH wnl  - CXR 9/5/18 with interval improvement  - Will check B12, folate, niacin, RPR, HIV  - Electrolytes WNL, glucose WNL  - CT head on admission without evidence of acute events  - No focal neurologic deficits to suggest stroke or ischemia  - Continue orienting patient - Patient presented with sepsis 2/2 to UTI and treated. Also developed aspiration pneumonia for which he was treated for HAP/aspiration pneumonia  - Continues to be AAOx1- AAOx2. Per wife this is not his baseline  - Most likely multifactorial in the setting of hospital associated delirium with contribution from underlying parkinson's disease and possible long term dementia vs long term alcohol use vs other underlying medical etiology  - Psych consulted, patient started on seroquel 12.5 ghs and q8hrs PRN agitation  - TSH wnl  - CXR 9/5/18 with interval improvement  - Will check B12, folate, RPR, HIV  - Electrolytes WNL, glucose WNL  - CT head on admission without evidence of acute events  - No focal neurologic deficits to suggest stroke or ischemia  - Continue orienting patient

## 2018-09-07 VITALS
HEART RATE: 99 BPM | RESPIRATION RATE: 22 BRPM | SYSTOLIC BLOOD PRESSURE: 120 MMHG | DIASTOLIC BLOOD PRESSURE: 84 MMHG | OXYGEN SATURATION: 96 % | TEMPERATURE: 98 F

## 2018-09-07 DIAGNOSIS — Z71.89 OTHER SPECIFIED COUNSELING: ICD-10-CM

## 2018-09-07 LAB
ANION GAP SERPL CALC-SCNC: 13 MMOL/L — SIGNIFICANT CHANGE UP (ref 5–17)
BUN SERPL-MCNC: 14 MG/DL — SIGNIFICANT CHANGE UP (ref 7–23)
CALCIUM SERPL-MCNC: 8.6 MG/DL — SIGNIFICANT CHANGE UP (ref 8.4–10.5)
CHLORIDE SERPL-SCNC: 103 MMOL/L — SIGNIFICANT CHANGE UP (ref 96–108)
CO2 SERPL-SCNC: 22 MMOL/L — SIGNIFICANT CHANGE UP (ref 22–31)
CREAT SERPL-MCNC: 1.28 MG/DL — SIGNIFICANT CHANGE UP (ref 0.5–1.3)
CULTURE RESULTS: SIGNIFICANT CHANGE UP
FERRITIN SERPL-MCNC: 308 NG/ML — SIGNIFICANT CHANGE UP (ref 30–400)
FOLATE SERPL-MCNC: >20 NG/ML — SIGNIFICANT CHANGE UP
GLUCOSE SERPL-MCNC: 93 MG/DL — SIGNIFICANT CHANGE UP (ref 70–99)
HCT VFR BLD CALC: 29.5 % — LOW (ref 39–50)
HGB BLD-MCNC: 9.3 G/DL — LOW (ref 13–17)
IRON SATN MFR SERPL: 19 % — SIGNIFICANT CHANGE UP (ref 16–55)
IRON SATN MFR SERPL: 28 UG/DL — LOW (ref 45–165)
MAGNESIUM SERPL-MCNC: 2 MG/DL — SIGNIFICANT CHANGE UP (ref 1.6–2.6)
MCHC RBC-ENTMCNC: 30.7 PG — SIGNIFICANT CHANGE UP (ref 27–34)
MCHC RBC-ENTMCNC: 31.5 G/DL — LOW (ref 32–36)
MCV RBC AUTO: 97.4 FL — SIGNIFICANT CHANGE UP (ref 80–100)
PLATELET # BLD AUTO: 312 K/UL — SIGNIFICANT CHANGE UP (ref 150–400)
POTASSIUM SERPL-MCNC: 3.8 MMOL/L — SIGNIFICANT CHANGE UP (ref 3.5–5.3)
POTASSIUM SERPL-SCNC: 3.8 MMOL/L — SIGNIFICANT CHANGE UP (ref 3.5–5.3)
RBC # BLD: 3.03 M/UL — LOW (ref 4.2–5.8)
RBC # BLD: 3.03 M/UL — LOW (ref 4.2–5.8)
RBC # FLD: 13.3 % — SIGNIFICANT CHANGE UP (ref 10.3–16.9)
RETICS/RBC NFR: 2 % — SIGNIFICANT CHANGE UP (ref 0.5–2.5)
SODIUM SERPL-SCNC: 138 MMOL/L — SIGNIFICANT CHANGE UP (ref 135–145)
SPECIMEN SOURCE: SIGNIFICANT CHANGE UP
T PALLIDUM AB TITR SER: NEGATIVE — SIGNIFICANT CHANGE UP
TIBC SERPL-MCNC: 151 UG/DL — LOW (ref 220–430)
TRANSFERRIN SERPL-MCNC: 120 MG/DL — LOW (ref 200–360)
UIBC SERPL-MCNC: 123 UG/DL — SIGNIFICANT CHANGE UP (ref 110–370)
VIT B12 SERPL-MCNC: 525 PG/ML — SIGNIFICANT CHANGE UP (ref 232–1245)
WBC # BLD: 6.7 K/UL — SIGNIFICANT CHANGE UP (ref 3.8–10.5)
WBC # FLD AUTO: 6.7 K/UL — SIGNIFICANT CHANGE UP (ref 3.8–10.5)

## 2018-09-07 PROCEDURE — 85730 THROMBOPLASTIN TIME PARTIAL: CPT

## 2018-09-07 PROCEDURE — 82746 ASSAY OF FOLIC ACID SERUM: CPT

## 2018-09-07 PROCEDURE — 93005 ELECTROCARDIOGRAM TRACING: CPT

## 2018-09-07 PROCEDURE — 83550 IRON BINDING TEST: CPT

## 2018-09-07 PROCEDURE — 82595 ASSAY OF CRYOGLOBULIN: CPT

## 2018-09-07 PROCEDURE — 87324 CLOSTRIDIUM AG IA: CPT

## 2018-09-07 PROCEDURE — 84540 ASSAY OF URINE/UREA-N: CPT

## 2018-09-07 PROCEDURE — 87389 HIV-1 AG W/HIV-1&-2 AB AG IA: CPT

## 2018-09-07 PROCEDURE — 84560 ASSAY OF URINE/URIC ACID: CPT

## 2018-09-07 PROCEDURE — 97116 GAIT TRAINING THERAPY: CPT

## 2018-09-07 PROCEDURE — 81001 URINALYSIS AUTO W/SCOPE: CPT

## 2018-09-07 PROCEDURE — 83935 ASSAY OF URINE OSMOLALITY: CPT

## 2018-09-07 PROCEDURE — 87040 BLOOD CULTURE FOR BACTERIA: CPT

## 2018-09-07 PROCEDURE — 87186 SC STD MICRODIL/AGAR DIL: CPT

## 2018-09-07 PROCEDURE — 82436 ASSAY OF URINE CHLORIDE: CPT

## 2018-09-07 PROCEDURE — 87086 URINE CULTURE/COLONY COUNT: CPT

## 2018-09-07 PROCEDURE — 82550 ASSAY OF CK (CPK): CPT

## 2018-09-07 PROCEDURE — 93970 EXTREMITY STUDY: CPT

## 2018-09-07 PROCEDURE — 87507 IADNA-DNA/RNA PROBE TQ 12-25: CPT

## 2018-09-07 PROCEDURE — 85610 PROTHROMBIN TIME: CPT

## 2018-09-07 PROCEDURE — 74176 CT ABD & PELVIS W/O CONTRAST: CPT

## 2018-09-07 PROCEDURE — 84484 ASSAY OF TROPONIN QUANT: CPT

## 2018-09-07 PROCEDURE — 80202 ASSAY OF VANCOMYCIN: CPT

## 2018-09-07 PROCEDURE — 84100 ASSAY OF PHOSPHORUS: CPT

## 2018-09-07 PROCEDURE — 36415 COLL VENOUS BLD VENIPUNCTURE: CPT

## 2018-09-07 PROCEDURE — 72125 CT NECK SPINE W/O DYE: CPT

## 2018-09-07 PROCEDURE — 83735 ASSAY OF MAGNESIUM: CPT

## 2018-09-07 PROCEDURE — 85025 COMPLETE CBC W/AUTO DIFF WBC: CPT

## 2018-09-07 PROCEDURE — 80307 DRUG TEST PRSMV CHEM ANLYZR: CPT

## 2018-09-07 PROCEDURE — 90686 IIV4 VACC NO PRSV 0.5 ML IM: CPT

## 2018-09-07 PROCEDURE — 82585 ASSAY OF CRYOFIBRINOGEN: CPT

## 2018-09-07 PROCEDURE — 86780 TREPONEMA PALLIDUM: CPT

## 2018-09-07 PROCEDURE — 93306 TTE W/DOPPLER COMPLETE: CPT

## 2018-09-07 PROCEDURE — 96375 TX/PRO/DX INJ NEW DRUG ADDON: CPT

## 2018-09-07 PROCEDURE — 92526 ORAL FUNCTION THERAPY: CPT | Mod: GN

## 2018-09-07 PROCEDURE — 82728 ASSAY OF FERRITIN: CPT

## 2018-09-07 PROCEDURE — 96361 HYDRATE IV INFUSION ADD-ON: CPT

## 2018-09-07 PROCEDURE — 82803 BLOOD GASES ANY COMBINATION: CPT

## 2018-09-07 PROCEDURE — 96365 THER/PROPH/DIAG IV INF INIT: CPT

## 2018-09-07 PROCEDURE — 71045 X-RAY EXAM CHEST 1 VIEW: CPT

## 2018-09-07 PROCEDURE — 92610 EVALUATE SWALLOWING FUNCTION: CPT | Mod: GN

## 2018-09-07 PROCEDURE — 73562 X-RAY EXAM OF KNEE 3: CPT

## 2018-09-07 PROCEDURE — 92612 ENDOSCOPY SWALLOW (FEES) VID: CPT | Mod: GN

## 2018-09-07 PROCEDURE — 99497 ADVNCD CARE PLAN 30 MIN: CPT | Mod: 25

## 2018-09-07 PROCEDURE — 82607 VITAMIN B-12: CPT

## 2018-09-07 PROCEDURE — 87449 NOS EACH ORGANISM AG IA: CPT

## 2018-09-07 PROCEDURE — 84133 ASSAY OF URINE POTASSIUM: CPT

## 2018-09-07 PROCEDURE — 71250 CT THORAX DX C-: CPT

## 2018-09-07 PROCEDURE — 83930 ASSAY OF BLOOD OSMOLALITY: CPT

## 2018-09-07 PROCEDURE — 76775 US EXAM ABDO BACK WALL LIM: CPT

## 2018-09-07 PROCEDURE — 85045 AUTOMATED RETICULOCYTE COUNT: CPT

## 2018-09-07 PROCEDURE — 80053 COMPREHEN METABOLIC PANEL: CPT

## 2018-09-07 PROCEDURE — 99233 SBSQ HOSP IP/OBS HIGH 50: CPT

## 2018-09-07 PROCEDURE — 82553 CREATINE MB FRACTION: CPT

## 2018-09-07 PROCEDURE — 82962 GLUCOSE BLOOD TEST: CPT

## 2018-09-07 PROCEDURE — 84300 ASSAY OF URINE SODIUM: CPT

## 2018-09-07 PROCEDURE — 99231 SBSQ HOSP IP/OBS SF/LOW 25: CPT

## 2018-09-07 PROCEDURE — 87070 CULTURE OTHR SPECIMN AEROBIC: CPT

## 2018-09-07 PROCEDURE — 84550 ASSAY OF BLOOD/URIC ACID: CPT

## 2018-09-07 PROCEDURE — 83605 ASSAY OF LACTIC ACID: CPT

## 2018-09-07 PROCEDURE — 94640 AIRWAY INHALATION TREATMENT: CPT

## 2018-09-07 PROCEDURE — 99239 HOSP IP/OBS DSCHRG MGMT >30: CPT

## 2018-09-07 PROCEDURE — 82140 ASSAY OF AMMONIA: CPT

## 2018-09-07 PROCEDURE — 70450 CT HEAD/BRAIN W/O DYE: CPT

## 2018-09-07 PROCEDURE — 84443 ASSAY THYROID STIM HORMONE: CPT

## 2018-09-07 PROCEDURE — 99285 EMERGENCY DEPT VISIT HI MDM: CPT | Mod: 25

## 2018-09-07 PROCEDURE — 82570 ASSAY OF URINE CREATININE: CPT

## 2018-09-07 PROCEDURE — 97530 THERAPEUTIC ACTIVITIES: CPT

## 2018-09-07 PROCEDURE — 85027 COMPLETE CBC AUTOMATED: CPT

## 2018-09-07 PROCEDURE — 73080 X-RAY EXAM OF ELBOW: CPT

## 2018-09-07 PROCEDURE — 80048 BASIC METABOLIC PNL TOTAL CA: CPT

## 2018-09-07 PROCEDURE — 97110 THERAPEUTIC EXERCISES: CPT

## 2018-09-07 PROCEDURE — 84466 ASSAY OF TRANSFERRIN: CPT

## 2018-09-07 RX ORDER — INFLUENZA VIRUS VACCINE 15; 15; 15; 15 UG/.5ML; UG/.5ML; UG/.5ML; UG/.5ML
0.5 SUSPENSION INTRAMUSCULAR ONCE
Qty: 0 | Refills: 0 | Status: COMPLETED | OUTPATIENT
Start: 2018-09-07 | End: 2018-09-07

## 2018-09-07 RX ORDER — METOPROLOL TARTRATE 50 MG
1 TABLET ORAL
Qty: 0 | Refills: 0 | COMMUNITY

## 2018-09-07 RX ORDER — SODIUM CHLORIDE 9 MG/ML
250 INJECTION INTRAMUSCULAR; INTRAVENOUS; SUBCUTANEOUS ONCE
Qty: 0 | Refills: 0 | Status: COMPLETED | OUTPATIENT
Start: 2018-09-07 | End: 2018-09-07

## 2018-09-07 RX ORDER — THIAMINE MONONITRATE (VIT B1) 100 MG
1 TABLET ORAL
Qty: 0 | Refills: 0 | COMMUNITY
Start: 2018-09-07

## 2018-09-07 RX ORDER — METOPROLOL TARTRATE 50 MG
1 TABLET ORAL
Qty: 0 | Refills: 0 | COMMUNITY
Start: 2018-09-07

## 2018-09-07 RX ADMIN — INFLUENZA VIRUS VACCINE 0.5 MILLILITER(S): 15; 15; 15; 15 SUSPENSION INTRAMUSCULAR at 14:50

## 2018-09-07 RX ADMIN — Medication 1 TABLET(S): at 09:24

## 2018-09-07 RX ADMIN — Medication 1 TABLET(S): at 11:53

## 2018-09-07 RX ADMIN — Medication 100 MILLIGRAM(S): at 06:07

## 2018-09-07 RX ADMIN — APIXABAN 5 MILLIGRAM(S): 2.5 TABLET, FILM COATED ORAL at 06:07

## 2018-09-07 RX ADMIN — FINASTERIDE 5 MILLIGRAM(S): 5 TABLET, FILM COATED ORAL at 11:53

## 2018-09-07 RX ADMIN — SODIUM CHLORIDE 1500 MILLILITER(S): 9 INJECTION INTRAMUSCULAR; INTRAVENOUS; SUBCUTANEOUS at 09:23

## 2018-09-07 RX ADMIN — CHLORHEXIDINE GLUCONATE 1 APPLICATION(S): 213 SOLUTION TOPICAL at 11:52

## 2018-09-07 RX ADMIN — Medication 1 MILLIGRAM(S): at 11:52

## 2018-09-07 RX ADMIN — CARBIDOPA AND LEVODOPA 1 TABLET(S): 25; 100 TABLET ORAL at 14:50

## 2018-09-07 RX ADMIN — Medication 5 MILLILITER(S): at 14:48

## 2018-09-07 RX ADMIN — Medication 1 APPLICATION(S): at 11:52

## 2018-09-07 RX ADMIN — Medication 100 MILLIGRAM(S): at 11:53

## 2018-09-07 RX ADMIN — Medication 1 APPLICATION(S): at 14:49

## 2018-09-07 RX ADMIN — CARBIDOPA AND LEVODOPA 1 TABLET(S): 25; 100 TABLET ORAL at 06:07

## 2018-09-07 NOTE — PROGRESS NOTE ADULT - PROVIDER SPECIALTY LIST ADULT
Critical Care
Critical Care
Hospitalist
Hospitalist
Internal Medicine
MICU
MICU
Palliative Care
Rehab Medicine
Rehab Medicine
Palliative Care
Internal Medicine

## 2018-09-07 NOTE — PROGRESS NOTE BEHAVIORAL HEALTH - NSBHCONSULTMEDAGITATION_PSY_A_CORE FT
Serqouel 25 mg PO q8h prn for agitation
Serqouel 12.5 mg PO q8h prn for agitation
Serqouel 12.5 mg PO q8h prn for agitation
Serqouel 25 mg PO q8h prn for agitation

## 2018-09-07 NOTE — PROGRESS NOTE ADULT - PROBLEM SELECTOR PLAN 1
- Patient presented with sepsis 2/2 to UTI and treated. Also developed aspiration pneumonia for which he was treated for HAP/aspiration pneumonia  - Continues to be AAOx1- AAOx2. Per wife this is not his baseline  - Most likely multifactorial in the setting of hospital associated delirium with contribution from underlying parkinson's disease and possible long term dementia vs long term alcohol use vs other underlying medical etiology  - Psych consulted, patient started on seroquel 12.5 ghs and q8hrs PRN agitation  - TSH wnl  - CXR 9/5/18 with interval improvement  - Will check B12, folate, RPR, HIV  - Electrolytes WNL, glucose WNL  - CT head on admission without evidence of acute events  - No focal neurologic deficits to suggest stroke or ischemia  - Continue orienting patient

## 2018-09-07 NOTE — PROGRESS NOTE BEHAVIORAL HEALTH - SUMMARY
89M h/o EtOH use disorder, Parkinson's, presenting with AMS in setting of fall at home, acute renal failure, UTI, PNA and sepsis. Likely EtOH use prior to admission (BAL 0 on this admission but 165 on 8/3) and patient predisposed to delirium given PD. Patient also presents with symptoms consistent with Wernicke-Korsakoff syndrome - confabulations, amnesia.   Continue Seroquel as below for delirium/agitation as lower risk of EPS than many other antipsychotics; seroquel to be discontinued prior to discharge home from rehab.

## 2018-09-07 NOTE — PROGRESS NOTE BEHAVIORAL HEALTH - SECONDARY DX1
Aspiration pneumonia of right lower lobe, unspecified aspiration pneumonia type

## 2018-09-07 NOTE — PROGRESS NOTE ADULT - PROBLEM SELECTOR PLAN 8
Transition of care performed with sharing of clinical summary.  Plan: 1) PCP Contacted on Admission: (Y/N) --> Name & Phone #: Dr. Reji Paulino [phone]  2) Date of Contact with PCP: 8/21/2018  3) PCP Contacted at Discharge: (Y/N)  4) Summary of Handoff Given to PCP:   5) Post-Discharge Appointment Date and Location:

## 2018-09-07 NOTE — PROGRESS NOTE ADULT - PROBLEM SELECTOR PLAN 3
- Increased metoprolol tartrate from 75mg BID to 100mg BID, better rate control but remains >100 bpm, will uptitrate for better rate control  - Continue apixaban 5mg q12h  - Continue monitoring

## 2018-09-07 NOTE — PROGRESS NOTE BEHAVIORAL HEALTH - NSBHADMITCOUNSEL_PSY_A_CORE
instructions for management, treatment and follow up/risk factor reduction/risks and benefits of treatment options/diagnostic results/impressions and/or recommended studies/importance of adherence to chosen treatment/client/family/caregiver education/prognosis
diagnostic results/impressions and/or recommended studies/risk factor reduction/importance of adherence to chosen treatment/risks and benefits of treatment options
diagnostic results/impressions and/or recommended studies/importance of adherence to chosen treatment/risk factor reduction/risks and benefits of treatment options
diagnostic results/impressions and/or recommended studies/importance of adherence to chosen treatment
diagnostic results/impressions and/or recommended studies/importance of adherence to chosen treatment/risks and benefits of treatment options/risk factor reduction

## 2018-09-07 NOTE — PROGRESS NOTE BEHAVIORAL HEALTH - PRIMARY DX
Delirium due to another medical condition

## 2018-09-07 NOTE — PROGRESS NOTE ADULT - ASSESSMENT
89 yo with Parkinsons found face down at home following a fall 24-48 hours previously.  Dehydrated hypotensive, renal failure.  Perhaps some role of alcohol although screen was negative.  Renal failure had improved but patient reemained confused.  Most recently with likely aspiration and sepsis transferred to and medically is  doing better however delirium persists, but slowly improving.    Plan for ALBIN today

## 2018-09-07 NOTE — PROGRESS NOTE ADULT - PROBLEM SELECTOR PROBLEM 10
Medical orders for life-sustaining treatment (MOLST) form in chart
Transition of care performed with sharing of clinical summary
Benign prostatic hyperplasia, unspecified whether lower urinary tract symptoms present
Benign prostatic hyperplasia, unspecified whether lower urinary tract symptoms present
Goals of care, counseling/discussion
Medical orders for life-sustaining treatment (MOLST) form in chart
Transition of care performed with sharing of clinical summary
Benign prostatic hyperplasia, unspecified whether lower urinary tract symptoms present
Transition of care performed with sharing of clinical summary
Transition of care performed with sharing of clinical summary
Medical orders for life-sustaining treatment (MOLST) form in chart

## 2018-09-07 NOTE — PROGRESS NOTE ADULT - ASSESSMENT
This is a 89 yo man with history of afib, Parkinson's disease, HTN, BPH, and ETOH abuse who presented after being found down, found to have sepsis secondary to complicated UTI. He was admitted for treatment and developed aspiration pneumonia, for which he was treated, but despite treatment continued to have altered mental status compared with baseline.

## 2018-09-07 NOTE — PROGRESS NOTE BEHAVIORAL HEALTH - THOUGHT PROCESS
Overinclusive/Perseverative
Overinclusive/Perseverative
Perseverative/Overinclusive
Overinclusive/Perseverative
Perseverative/Overinclusive

## 2018-09-07 NOTE — PROGRESS NOTE ADULT - PROBLEM SELECTOR PLAN 10
Advance care planning meeting  Start time: 12:00 PM  End time: 12:30 PM  Total time: 30 minutes    In addition to E and M visit documented above, A face to face meeting to discuss advance care planning was held today regarding: SADIA LUTZ  Primary decision maker: Angle Lutz  Alternate/surrogate:  Discussed advance directives including, but not limited to, healthcare proxy and code status. At wife's request we reviewed aspects of the DNR/DNI order and MOLST completed previously on 8/30/2018.  Discussed issues of feeding tube per MELINDA, but wife was not sure what she wanted to say about that, so that order was left blank  Decision regarding code status: DNR/DNI based on known wishes of patient expressed to his niece  Documentation completed today: MELINDA

## 2018-09-07 NOTE — PROGRESS NOTE BEHAVIORAL HEALTH - RISK ASSESSMENT
Patient presents calm and cooperative.

## 2018-09-07 NOTE — PROGRESS NOTE BEHAVIORAL HEALTH - NSBHFUPINTERVALHXFT_PSY_A_CORE
Patient seen and interviewed at bedside. Patient's wife was in his room. Patient continues to be confused and to confabulate (when asked if he recognizes the writer, he said that "yes, we met a long time ago". No episodes of agitation. He was taken off posey vest. He was oriented to place (hospital) but not to time.

## 2018-09-07 NOTE — PROGRESS NOTE ADULT - SUBJECTIVE AND OBJECTIVE BOX
SADIA LUTZ   MRN-[id number]     ([id number]):   CC: persistent delirium   HPI:  89M with Parkinson's (on Sinemet), Afib (Toprol XL and Coumadin), HTN, BPH and ETOH abuse who was BIBEMS after being found down at home. Wife was out of town caring for another sick family member.  He was alone and probably fell 24-48 hour earlier.  On admission was in renal failure and found to have a UTI.  Toxic screen was negative for drugs or alcohol.    Subjective:   Patient's delirium persists but overall appears better Patient seen  today by PT and although still confused, was able to participate a bit  He is calmer, wife at bedside still wearing a posey for his safety  His stories were confabulatory but less so.  Eating well.  He tells me he has a good appetite.  He continues to have some cough, non productive and it doesn't bother him  Denies SOB    Wife at bedside and today he can identify her and knows her name    Benedict is off and although he tries to get out of bed he is easily reminded not too.  Overall noted to be calmer per nursing staff.  Level of orientation seems to vary.  Presently does not know he is in the hospital, but earlier he did    Metoprolol increased for better a fib rate control    ROS:    Unable to obtain due to: delirium                    Dyspnea (Carissa 0-10):   N              N/V (Y/N):                N              Secretions (Y/N) :    N            Agitation(Y/N):        N  Pain (Y/N):     Patient again reporting some knee pain.  He says its worse when people make him move around    Allergies    aspirin (Unknown)    Intolerances    Opiate Naive (Y/N): yes, med list obtained from patient's pharmacy    Medications:      MEDICATIONS  (STANDING):  MEDICATIONS  (STANDING):  apixaban 5 milliGRAM(s) Oral every 12 hours  BACItracin   Ointment 1 Application(s) Topical daily  Biotene Dry Mouth Oral Rinse 5 milliLiter(s) Swish and Spit daily  carbidopa/levodopa  25/100 1 Tablet(s) Oral three times a day  chlorhexidine 2% Cloths 1 Application(s) Topical daily  erythromycin   Ointment 1 Application(s) Right EYE daily  finasteride 5 milliGRAM(s) Oral daily  folic acid 1 milliGRAM(s) Oral daily  lactobacillus acidophilus 1 Tablet(s) Oral three times a day with meals  metoprolol tartrate 100 milliGRAM(s) Oral every 12 hours  multivitamin 1 Tablet(s) Oral daily  QUEtiapine 12.5 milliGRAM(s) Oral at bedtime  tamsulosin 0.8 milliGRAM(s) Oral at bedtime  thiamine 100 milliGRAM(s) Oral daily  vitamin A &amp; D Ointment 1 Application(s) Topical daily    MEDICATIONS  (PRN):              Labs:                                              9.3    6.7   )-----------( 312      ( 07 Sep 2018 08:18 )             29.5       138  |  103  |  14  ----------------------------<  93  3.8   |  22  |  1.28    Ca    8.6      07 Sep 2018 08:18  Mg     2.0                       Feces: GI PCR neg  C Diff neg    Imaging: no new imaging    PEx:  Vital Signs Last 24 Hrs  T(C): 36.7 (07 Sep 2018 08:50), Max: 36.8 (06 Sep 2018 17:07)  T(F): 98 (07 Sep 2018 08:50), Max: 98.3 (06 Sep 2018 21:25)  HR: 99 (07 Sep 2018 08:50) (99 - 123)  BP: 120/84 (07 Sep 2018 08:50) (120/84 - 165/94)  BP(mean): --  RR: 22 (07 Sep 2018 08:50) (20 - 24)  SpO2: 96% (07 Sep 2018 08:50) (94% - 96%)    General: elderly gentleman in bed, confused, pleasant in NAD    HEENT:  non-icteric sclera, facial wounds fully healed  Neck: supple no JVD  CVS :irregularly no murmur heard,  tachycardia  Resp: decreased BS both bases  GI:  normal BS, non-tender, no rectal tube, some redness of skin in the crease  :  chan in situ  Musc:   equal strength, no significant wasting, no joint erythema or effusion  Neuro: alert, oriented  to name only, states he was the  p resident, no focal deficits   Psych:   confused, calm and cooperative   Skin: diffuse actinic keratoses and evidence of sun damage, skin abrasions lower extremities resolved  Lymph:no adenopathy  Preadmit Karnofsky: presumed 60-70%           Current Karnofsky:     40%  Cachexia (Y/N): no  BMI: 25.4    Advanced Directives:     DNR/DNI     MOLST  wife wishes to review      Decision maker: Patient presently not able to make complex medical decisions  Legal surrogate: wife Angle Lutz            Documentation of GOC: to recover from this event, to go to rehab.  Wife presently overwhelmed regarding family illness  Hoping for improvement in strength and mental status.               REFERRALS	       Palliative Med               PT/OT

## 2018-09-07 NOTE — PROGRESS NOTE BEHAVIORAL HEALTH - NSBHCONSULTMEDS_PSY_A_CORE FT
1. Continue Seroquel 12.5 mg PO HS for now; would not plan to discharge on this medication  2. Can give additional Serqouel 12.5 mg PO q8h prn for agitation  3. Avoid benzodiazepines  4. Frequent reorientation, exposure to natural light  5. Trial removal of restraints
1. Continue Seroquel 12.5 mg PO HS for now; would not plan to discharge on this medication  2. Can give additional Serqouel 12.5 mg PO q8h prn for agitation  3. Avoid benzodiazepines  4. Frequent reorientation, exposure to natural light  5. Trial removal of restraints
1. Increase Seroquel to 25mg po qhs; would not plan to discharge on this medication  2. Can give additional Serqouel 25 mg PO q8h prn for agitation  3. Avoid benzodiazepines  4. Frequent reorientation, exposure to natural light  5. Trial removal of restraints
1. Continue Seroquel 12.5 mg PO HS for now; would not plan to discharge on this medication  2. Can give additional Serqouel 12.5 mg PO q8h prn for agitation  3. Avoid benzodiazepines  4. Frequent reorientation, exposure to natural light  5. Trial removal of restraints
1. continue Seroquel to 25mg po qhs; would not plan to discharge on this medication  2. Can give additional Serqouel 25 mg PO q8h prn for agitation  3. Avoid benzodiazepines  4. Frequent reorientation, exposure to natural light  5. Trial removal of restraints

## 2018-09-07 NOTE — PROGRESS NOTE ADULT - NSHPATTENDINGPLANDISCUSS_GEN_ALL_CORE
medical resident
pts wife
7 Lach Team
7 Lach Team
Pall Med SW, Primary Team
MICU Team
MICU Team, Dr. Barker
7 lach Team
Dr. Cartwright (Psychiatry), [facility] Team
Patient's wife, Pall Med SW, 7 Lac SW, 7 [facility] Team
4 [facility]ris Team, 4 Uris SW
wife
Dr. Jacob
as above
as above
House staff
as above
housestaff

## 2018-09-07 NOTE — PROGRESS NOTE ADULT - PROBLEM SELECTOR PLAN 4
- Patient had Good inserted for urinary retention and has failed TOV x3, most recently 9/2/2018. Will consider additional TOV vs plan for discharge with indwelling catheter and outpatient management  - No s/s of acute urinary infections and completed course of treatment with ceftriaxone 8/27/18 for complicated UTI leading to sepsis

## 2018-09-07 NOTE — PROGRESS NOTE ADULT - SUBJECTIVE AND OBJECTIVE BOX
OVERNIGHT EVENTS: CHRIS    SUBJECTIVE / INTERVAL HPI: Patient seen and examined at bedside. Reports feeling well, continues to note his arthritic pains and his productive cough. Denies any chest pain, denies shortness of breath, denies muscle aches and denies abdominal pain.    VITAL SIGNS:  Vital Signs Last 24 Hrs  T(C): 36.8 (07 Sep 2018 05:20), Max: 37.1 (06 Sep 2018 10:46)  T(F): 98.2 (07 Sep 2018 05:20), Max: 98.8 (06 Sep 2018 10:46)  HR: 107 (07 Sep 2018 05:20) (98 - 124)  BP: 142/68 (07 Sep 2018 05:20) (142/68 - 170/95)  BP(mean): 124 (06 Sep 2018 13:35) (111 - 128)  RR: 20 (07 Sep 2018 05:20) (18 - 24)  SpO2: 96% (07 Sep 2018 05:20) (94% - 96%)    PHYSICAL EXAM:    General: Well developed, no acute distress  HEENT: NC/AT; PERRL, anicteric sclera; MMM  Neck: supple, no JVD  Cardiovascular: irregularly irregular on palpation, +S1/S2, no murmurs, rubs, gallops  Respiratory: Bibasilar inspiratory crackles at lower lung bases, no wheezing  Gastrointestinal: soft abdomen, no rebound, NT/ND; +BSx4  Extremities: WWP; no edema, clubbing or cyanosis  Vascular: 2+ radial and pedal pulses  Neurological: AAOx1 (name); no focal deficits. No cogwheeling    MEDICATIONS:  MEDICATIONS  (STANDING):  apixaban 5 milliGRAM(s) Oral every 12 hours  BACItracin   Ointment 1 Application(s) Topical daily  Biotene Dry Mouth Oral Rinse 5 milliLiter(s) Swish and Spit daily  carbidopa/levodopa  25/100 1 Tablet(s) Oral three times a day  chlorhexidine 2% Cloths 1 Application(s) Topical daily  erythromycin   Ointment 1 Application(s) Right EYE daily  finasteride 5 milliGRAM(s) Oral daily  folic acid 1 milliGRAM(s) Oral daily  lactobacillus acidophilus 1 Tablet(s) Oral three times a day with meals  metoprolol tartrate 100 milliGRAM(s) Oral every 12 hours  multivitamin 1 Tablet(s) Oral daily  QUEtiapine 12.5 milliGRAM(s) Oral at bedtime  tamsulosin 0.8 milliGRAM(s) Oral at bedtime  thiamine 100 milliGRAM(s) Oral daily  vitamin A &amp; D Ointment 1 Application(s) Topical daily    MEDICATIONS  (PRN):    ALLERGIES:  Allergies    aspirin (Unknown)    Intolerances        LABS:                        9.7    6.3   )-----------( 307      ( 06 Sep 2018 07:40 )             30.3     09-06    139  |  101  |  11  ----------------------------<  100<H>  3.6   |  23  |  1.26    Ca    8.7      06 Sep 2018 07:40    RADIOLOGY & ADDITIONAL TESTS: Reviewed.

## 2018-09-07 NOTE — PROGRESS NOTE ADULT - PROBLEM SELECTOR PLAN 7
F: No IVF needed  E: Replete PRN  N: Mechanical soft with nectar thin liquids, aspiration precautions  Prophy: Eliquis  DNR/DNI.  Dispo: Continue workup for AMS

## 2018-09-07 NOTE — PROGRESS NOTE ADULT - PROBLEM SELECTOR PLAN 1
persists but is better today  Suggest more focus on environmental issues  OOB OOB to chair, reorientation   continue Seroquel 12.5 mg po q 12 hr with q 8 hr prn dosing    Please consider adding Tylenol perhaps BID for arthritic pain- which may be helpful for his delirium    To rehab this afternoon

## 2018-09-07 NOTE — PROGRESS NOTE BEHAVIORAL HEALTH - NSBHCONSFOLLOWNEEDS_PSY_A_CORE
no psychiatric contraindications to discharge

## 2018-09-07 NOTE — PROGRESS NOTE BEHAVIORAL HEALTH - OTHER
notable R hand tremor

## 2018-09-11 DIAGNOSIS — G92 TOXIC ENCEPHALOPATHY: ICD-10-CM

## 2018-09-11 DIAGNOSIS — D69.6 THROMBOCYTOPENIA, UNSPECIFIED: ICD-10-CM

## 2018-09-11 DIAGNOSIS — D64.9 ANEMIA, UNSPECIFIED: ICD-10-CM

## 2018-09-11 DIAGNOSIS — R65.20 SEVERE SEPSIS WITHOUT SEPTIC SHOCK: ICD-10-CM

## 2018-09-11 DIAGNOSIS — R09.02 HYPOXEMIA: ICD-10-CM

## 2018-09-11 DIAGNOSIS — A41.9 SEPSIS, UNSPECIFIED ORGANISM: ICD-10-CM

## 2018-09-11 DIAGNOSIS — N18.3 CHRONIC KIDNEY DISEASE, STAGE 3 (MODERATE): ICD-10-CM

## 2018-09-11 DIAGNOSIS — Z79.01 LONG TERM (CURRENT) USE OF ANTICOAGULANTS: ICD-10-CM

## 2018-09-11 DIAGNOSIS — G20 PARKINSON'S DISEASE: ICD-10-CM

## 2018-09-11 DIAGNOSIS — R00.0 TACHYCARDIA, UNSPECIFIED: ICD-10-CM

## 2018-09-11 DIAGNOSIS — I12.9 HYPERTENSIVE CHRONIC KIDNEY DISEASE WITH STAGE 1 THROUGH STAGE 4 CHRONIC KIDNEY DISEASE, OR UNSPECIFIED CHRONIC KIDNEY DISEASE: ICD-10-CM

## 2018-09-11 DIAGNOSIS — Z51.5 ENCOUNTER FOR PALLIATIVE CARE: ICD-10-CM

## 2018-09-11 DIAGNOSIS — F10.10 ALCOHOL ABUSE, UNCOMPLICATED: ICD-10-CM

## 2018-09-11 DIAGNOSIS — J69.0 PNEUMONITIS DUE TO INHALATION OF FOOD AND VOMIT: ICD-10-CM

## 2018-09-11 DIAGNOSIS — Z88.6 ALLERGY STATUS TO ANALGESIC AGENT: ICD-10-CM

## 2018-09-11 DIAGNOSIS — Z66 DO NOT RESUSCITATE: ICD-10-CM

## 2018-09-11 DIAGNOSIS — N39.0 URINARY TRACT INFECTION, SITE NOT SPECIFIED: ICD-10-CM

## 2018-09-11 DIAGNOSIS — M62.82 RHABDOMYOLYSIS: ICD-10-CM

## 2018-09-11 DIAGNOSIS — I48.2 CHRONIC ATRIAL FIBRILLATION: ICD-10-CM

## 2018-09-11 DIAGNOSIS — R33.9 RETENTION OF URINE, UNSPECIFIED: ICD-10-CM

## 2018-09-11 DIAGNOSIS — N40.1 BENIGN PROSTATIC HYPERPLASIA WITH LOWER URINARY TRACT SYMPTOMS: ICD-10-CM

## 2018-09-11 DIAGNOSIS — N13.8 OTHER OBSTRUCTIVE AND REFLUX UROPATHY: ICD-10-CM

## 2018-09-11 DIAGNOSIS — N17.9 ACUTE KIDNEY FAILURE, UNSPECIFIED: ICD-10-CM

## 2018-09-11 DIAGNOSIS — Z23 ENCOUNTER FOR IMMUNIZATION: ICD-10-CM

## 2018-09-19 ENCOUNTER — INPATIENT (INPATIENT)
Facility: HOSPITAL | Age: 83
LOS: 1 days | Discharge: HOSPICE | DRG: 871 | End: 2018-09-21
Attending: STUDENT IN AN ORGANIZED HEALTH CARE EDUCATION/TRAINING PROGRAM | Admitting: STUDENT IN AN ORGANIZED HEALTH CARE EDUCATION/TRAINING PROGRAM
Payer: COMMERCIAL

## 2018-09-19 VITALS
RESPIRATION RATE: 30 BRPM | DIASTOLIC BLOOD PRESSURE: 59 MMHG | HEART RATE: 119 BPM | SYSTOLIC BLOOD PRESSURE: 98 MMHG | OXYGEN SATURATION: 87 %

## 2018-09-19 DIAGNOSIS — J96.01 ACUTE RESPIRATORY FAILURE WITH HYPOXIA: ICD-10-CM

## 2018-09-19 DIAGNOSIS — J18.9 PNEUMONIA, UNSPECIFIED ORGANISM: ICD-10-CM

## 2018-09-19 DIAGNOSIS — I10 ESSENTIAL (PRIMARY) HYPERTENSION: ICD-10-CM

## 2018-09-19 DIAGNOSIS — Z29.9 ENCOUNTER FOR PROPHYLACTIC MEASURES, UNSPECIFIED: ICD-10-CM

## 2018-09-19 DIAGNOSIS — Z91.89 OTHER SPECIFIED PERSONAL RISK FACTORS, NOT ELSEWHERE CLASSIFIED: ICD-10-CM

## 2018-09-19 DIAGNOSIS — G20 PARKINSON'S DISEASE: ICD-10-CM

## 2018-09-19 DIAGNOSIS — N17.9 ACUTE KIDNEY FAILURE, UNSPECIFIED: ICD-10-CM

## 2018-09-19 DIAGNOSIS — A41.9 SEPSIS, UNSPECIFIED ORGANISM: ICD-10-CM

## 2018-09-19 DIAGNOSIS — J81.0 ACUTE PULMONARY EDEMA: ICD-10-CM

## 2018-09-19 DIAGNOSIS — I48.91 UNSPECIFIED ATRIAL FIBRILLATION: ICD-10-CM

## 2018-09-19 LAB
ALBUMIN SERPL ELPH-MCNC: 3.4 G/DL — SIGNIFICANT CHANGE UP (ref 3.3–5)
ALP SERPL-CCNC: 85 U/L — SIGNIFICANT CHANGE UP (ref 40–120)
ALT FLD-CCNC: <5 U/L — LOW (ref 10–45)
ANION GAP SERPL CALC-SCNC: 17 MMOL/L — SIGNIFICANT CHANGE UP (ref 5–17)
APTT BLD: 32 SEC — SIGNIFICANT CHANGE UP (ref 27.5–37.4)
AST SERPL-CCNC: 21 U/L — SIGNIFICANT CHANGE UP (ref 10–40)
BASE EXCESS BLDV CALC-SCNC: 0.3 MMOL/L — SIGNIFICANT CHANGE UP
BASOPHILS NFR BLD AUTO: 0.5 % — SIGNIFICANT CHANGE UP (ref 0–2)
BILIRUB SERPL-MCNC: 0.5 MG/DL — SIGNIFICANT CHANGE UP (ref 0.2–1.2)
BUN SERPL-MCNC: 25 MG/DL — HIGH (ref 7–23)
CA-I SERPL-SCNC: 1.16 MMOL/L — SIGNIFICANT CHANGE UP (ref 1.12–1.3)
CALCIUM SERPL-MCNC: 9.7 MG/DL — SIGNIFICANT CHANGE UP (ref 8.4–10.5)
CHLORIDE SERPL-SCNC: 106 MMOL/L — SIGNIFICANT CHANGE UP (ref 96–108)
CO2 SERPL-SCNC: 22 MMOL/L — SIGNIFICANT CHANGE UP (ref 22–31)
CREAT SERPL-MCNC: 1.98 MG/DL — HIGH (ref 0.5–1.3)
EOSINOPHIL NFR BLD AUTO: 2.5 % — SIGNIFICANT CHANGE UP (ref 0–6)
GAS PNL BLDV: 147 MMOL/L — HIGH (ref 138–146)
GAS PNL BLDV: SIGNIFICANT CHANGE UP
GAS PNL BLDV: SIGNIFICANT CHANGE UP
GLUCOSE SERPL-MCNC: 183 MG/DL — HIGH (ref 70–99)
HCO3 BLDV-SCNC: 27 MMOL/L — SIGNIFICANT CHANGE UP (ref 20–27)
HCT VFR BLD CALC: 37.3 % — LOW (ref 39–50)
HGB BLD-MCNC: 11.5 G/DL — LOW (ref 13–17)
INR BLD: 2.02 — HIGH (ref 0.88–1.16)
LACTATE SERPL-SCNC: 3.4 MMOL/L — HIGH (ref 0.5–2)
LYMPHOCYTES # BLD AUTO: 8.3 % — LOW (ref 13–44)
MCHC RBC-ENTMCNC: 30.4 PG — SIGNIFICANT CHANGE UP (ref 27–34)
MCHC RBC-ENTMCNC: 30.8 G/DL — LOW (ref 32–36)
MCV RBC AUTO: 98.7 FL — SIGNIFICANT CHANGE UP (ref 80–100)
MONOCYTES NFR BLD AUTO: 5.1 % — SIGNIFICANT CHANGE UP (ref 2–14)
NEUTROPHILS NFR BLD AUTO: 83.6 % — HIGH (ref 43–77)
PCO2 BLDV: 54 MMHG — HIGH (ref 41–51)
PH BLDV: 7.32 — SIGNIFICANT CHANGE UP (ref 7.32–7.43)
PLATELET # BLD AUTO: 328 K/UL — SIGNIFICANT CHANGE UP (ref 150–400)
PO2 BLDV: 23 MMHG — SIGNIFICANT CHANGE UP
POTASSIUM BLDV-SCNC: 3.6 MMOL/L — SIGNIFICANT CHANGE UP (ref 3.5–4.9)
POTASSIUM SERPL-MCNC: 3.8 MMOL/L — SIGNIFICANT CHANGE UP (ref 3.5–5.3)
POTASSIUM SERPL-SCNC: 3.8 MMOL/L — SIGNIFICANT CHANGE UP (ref 3.5–5.3)
PROT SERPL-MCNC: 7.9 G/DL — SIGNIFICANT CHANGE UP (ref 6–8.3)
PROTHROM AB SERPL-ACNC: 22.7 SEC — HIGH (ref 9.8–12.7)
RBC # BLD: 3.78 M/UL — LOW (ref 4.2–5.8)
RBC # FLD: 13.3 % — SIGNIFICANT CHANGE UP (ref 10.3–16.9)
SAO2 % BLDV: 26 % — SIGNIFICANT CHANGE UP
SODIUM SERPL-SCNC: 145 MMOL/L — SIGNIFICANT CHANGE UP (ref 135–145)
WBC # BLD: 11 K/UL — HIGH (ref 3.8–10.5)
WBC # FLD AUTO: 11 K/UL — HIGH (ref 3.8–10.5)

## 2018-09-19 PROCEDURE — 99223 1ST HOSP IP/OBS HIGH 75: CPT | Mod: GC

## 2018-09-19 PROCEDURE — 93010 ELECTROCARDIOGRAM REPORT: CPT

## 2018-09-19 PROCEDURE — 71045 X-RAY EXAM CHEST 1 VIEW: CPT | Mod: 26

## 2018-09-19 PROCEDURE — 99291 CRITICAL CARE FIRST HOUR: CPT

## 2018-09-19 RX ORDER — VANCOMYCIN HCL 1 G
1000 VIAL (EA) INTRAVENOUS ONCE
Qty: 0 | Refills: 0 | Status: COMPLETED | OUTPATIENT
Start: 2018-09-19 | End: 2018-09-19

## 2018-09-19 RX ORDER — NITROGLYCERIN 6.5 MG
0.5 CAPSULE, EXTENDED RELEASE ORAL ONCE
Qty: 0 | Refills: 0 | Status: COMPLETED | OUTPATIENT
Start: 2018-09-19 | End: 2018-09-19

## 2018-09-19 RX ORDER — SODIUM CHLORIDE 9 MG/ML
1000 INJECTION INTRAMUSCULAR; INTRAVENOUS; SUBCUTANEOUS ONCE
Qty: 0 | Refills: 0 | Status: COMPLETED | OUTPATIENT
Start: 2018-09-19 | End: 2018-09-19

## 2018-09-19 RX ORDER — LABETALOL HCL 100 MG
20 TABLET ORAL ONCE
Qty: 0 | Refills: 0 | Status: COMPLETED | OUTPATIENT
Start: 2018-09-19 | End: 2018-09-19

## 2018-09-19 RX ORDER — IPRATROPIUM/ALBUTEROL SULFATE 18-103MCG
3 AEROSOL WITH ADAPTER (GRAM) INHALATION ONCE
Qty: 0 | Refills: 0 | Status: COMPLETED | OUTPATIENT
Start: 2018-09-19 | End: 2018-09-19

## 2018-09-19 RX ORDER — PIPERACILLIN AND TAZOBACTAM 4; .5 G/20ML; G/20ML
3.38 INJECTION, POWDER, LYOPHILIZED, FOR SOLUTION INTRAVENOUS EVERY 6 HOURS
Qty: 0 | Refills: 0 | Status: DISCONTINUED | OUTPATIENT
Start: 2018-09-19 | End: 2018-09-20

## 2018-09-19 RX ORDER — BACITRACIN ZINC 500 UNIT/G
1 OINTMENT IN PACKET (EA) TOPICAL DAILY
Qty: 0 | Refills: 0 | Status: DISCONTINUED | OUTPATIENT
Start: 2018-09-19 | End: 2018-09-20

## 2018-09-19 RX ORDER — MORPHINE SULFATE 50 MG/1
1 CAPSULE, EXTENDED RELEASE ORAL
Qty: 0 | Refills: 0 | Status: DISCONTINUED | OUTPATIENT
Start: 2018-09-19 | End: 2018-09-20

## 2018-09-19 RX ORDER — PIPERACILLIN AND TAZOBACTAM 4; .5 G/20ML; G/20ML
4.5 INJECTION, POWDER, LYOPHILIZED, FOR SOLUTION INTRAVENOUS ONCE
Qty: 0 | Refills: 0 | Status: COMPLETED | OUTPATIENT
Start: 2018-09-19 | End: 2018-09-19

## 2018-09-19 RX ORDER — CHLORHEXIDINE GLUCONATE 213 G/1000ML
1 SOLUTION TOPICAL DAILY
Qty: 0 | Refills: 0 | Status: DISCONTINUED | OUTPATIENT
Start: 2018-09-19 | End: 2018-09-20

## 2018-09-19 RX ORDER — ACETAMINOPHEN 500 MG
1000 TABLET ORAL ONCE
Qty: 0 | Refills: 0 | Status: COMPLETED | OUTPATIENT
Start: 2018-09-19 | End: 2018-09-19

## 2018-09-19 RX ORDER — MORPHINE SULFATE 50 MG/1
2 CAPSULE, EXTENDED RELEASE ORAL ONCE
Qty: 0 | Refills: 0 | Status: DISCONTINUED | OUTPATIENT
Start: 2018-09-19 | End: 2018-09-19

## 2018-09-19 RX ORDER — SODIUM CHLORIDE 9 MG/ML
500 INJECTION INTRAMUSCULAR; INTRAVENOUS; SUBCUTANEOUS ONCE
Qty: 0 | Refills: 0 | Status: COMPLETED | OUTPATIENT
Start: 2018-09-19 | End: 2018-09-19

## 2018-09-19 RX ORDER — SALICYLIC ACID 0.5 %
1 CLEANSER (GRAM) TOPICAL DAILY
Qty: 0 | Refills: 0 | Status: DISCONTINUED | OUTPATIENT
Start: 2018-09-19 | End: 2018-09-21

## 2018-09-19 RX ORDER — VANCOMYCIN HCL 1 G
1000 VIAL (EA) INTRAVENOUS EVERY 24 HOURS
Qty: 0 | Refills: 0 | Status: DISCONTINUED | OUTPATIENT
Start: 2018-09-20 | End: 2018-09-20

## 2018-09-19 RX ORDER — ACETAMINOPHEN 500 MG
650 TABLET ORAL EVERY 4 HOURS
Qty: 0 | Refills: 0 | Status: DISCONTINUED | OUTPATIENT
Start: 2018-09-19 | End: 2018-09-20

## 2018-09-19 RX ORDER — FUROSEMIDE 40 MG
40 TABLET ORAL ONCE
Qty: 0 | Refills: 0 | Status: COMPLETED | OUTPATIENT
Start: 2018-09-19 | End: 2018-09-19

## 2018-09-19 RX ADMIN — Medication 1 APPLICATION(S): at 23:11

## 2018-09-19 RX ADMIN — Medication 40 MILLIGRAM(S): at 16:06

## 2018-09-19 RX ADMIN — Medication 20 MILLIGRAM(S): at 17:38

## 2018-09-19 RX ADMIN — PIPERACILLIN AND TAZOBACTAM 200 GRAM(S): 4; .5 INJECTION, POWDER, LYOPHILIZED, FOR SOLUTION INTRAVENOUS at 23:11

## 2018-09-19 RX ADMIN — Medication 3 MILLILITER(S): at 15:30

## 2018-09-19 RX ADMIN — MORPHINE SULFATE 2 MILLIGRAM(S): 50 CAPSULE, EXTENDED RELEASE ORAL at 18:53

## 2018-09-19 RX ADMIN — Medication 0.5 INCH(S): at 15:49

## 2018-09-19 RX ADMIN — Medication 400 MILLIGRAM(S): at 19:12

## 2018-09-19 RX ADMIN — SODIUM CHLORIDE 1000 MILLILITER(S): 9 INJECTION INTRAMUSCULAR; INTRAVENOUS; SUBCUTANEOUS at 15:30

## 2018-09-19 RX ADMIN — Medication 250 MILLIGRAM(S): at 16:30

## 2018-09-19 RX ADMIN — Medication 1000 MILLIGRAM(S): at 19:42

## 2018-09-19 RX ADMIN — SODIUM CHLORIDE 1000 MILLILITER(S): 9 INJECTION INTRAMUSCULAR; INTRAVENOUS; SUBCUTANEOUS at 23:07

## 2018-09-19 RX ADMIN — PIPERACILLIN AND TAZOBACTAM 200 GRAM(S): 4; .5 INJECTION, POWDER, LYOPHILIZED, FOR SOLUTION INTRAVENOUS at 15:44

## 2018-09-19 RX ADMIN — MORPHINE SULFATE 2 MILLIGRAM(S): 50 CAPSULE, EXTENDED RELEASE ORAL at 19:08

## 2018-09-19 NOTE — ED ADULT NURSE NOTE - NSIMPLEMENTINTERV_GEN_ALL_ED
Implemented All Fall with Harm Risk Interventions:  [place] to call system. Call bell, personal items and telephone within reach. Instruct patient to call for assistance. Room bathroom lighting operational. Non-slip footwear when patient is off stretcher. Physically safe environment: no spills, clutter or unnecessary equipment. Stretcher in lowest position, wheels locked, appropriate side rails in place. Provide visual cue, wrist band, yellow gown, etc. Monitor gait and stability. Monitor for mental status changes and reorient to person, place, and time. Review medications for side effects contributing to fall risk. Reinforce activity limits and safety measures with patient and family. Provide visual clues: red socks.

## 2018-09-19 NOTE — PATIENT PROFILE ADULT. - NS PRO AD PATIENT TYPE
DNR/DNI, no MEWS, no escalation of care. MOLST in chart/Do Not Resuscitate (DNR)/Medical Orders for Life-Sustaining Treatment (MOLST)

## 2018-09-19 NOTE — H&P ADULT - PROBLEM SELECTOR PLAN 1
Suspected source pulmonary - aspiration PNA high on DDx vs. HAP as recent discharge and prior IV abx administered within last 90 days; +lactate, +chan POA however initially anuric and at time of note writing severely oliguric therefore unable to obtain UA at present time to evaluate  source  - per GOC discussion w/ wife, will attempt trial of antimicrobial therapy with vancomycin and zosyn for both HAP, asp PNA or UTI coverage given presence of chan on admission  - unable to perform 30cc/kg IVF resuscitation as patient seemingly had flash pulmonary edema and lungs w/ diffuse bilateral crackles -- risk of giving IVF tremendously outweighs benefit currently  - wife requesting to minimize pain, therefore will defer additional phlebotomy at this time including repeat lactate level; reperfusion exam performed but still with signs of diminished end-organ perfusion  - f/u BCx already obtained by ED  - will attempt to obtain UA/UCx if patient begins making urine  - tylenol for fever and pain

## 2018-09-19 NOTE — ED PROVIDER NOTE - CARDIAC, MLM
Normal rate, regular rhythm.  Heart sounds S1, S2.  No murmurs, rubs or gallops. Tachycardic, irregular

## 2018-09-19 NOTE — ED PROVIDER NOTE - OBJECTIVE STATEMENT
This is a 89 yo man with afib (on Apixaban), Parkinson's, BPH, HTN and ETOH abuse, DNR/DNI with recent prolonged hospitalization at [facility] for toxic metabolic encephalopathy presents from [facility] after he was found to be in resp distress, unresponsive and  with O2 sats in 70s. Pt was given an alb neb treatment and EMS was called and pt was found with O2 sats in 70s and CPAP was initiated by EMS with improvement of O2 sats to 88% on CPAP. Pt arrives to ED with O2 sats in 80s on CPAP, tachypneic and minimally responsive. History obtained from EMS and NH paperwork. 89 yo male with PMH of afib (on Apixaban), Parkinson's, BPH, HTN and ETOH abuse, DNR/DNI with recent prolonged hospitalization at [facility] for toxic metabolic encephalopathy presents from [facility] after he was found to be in resp distress, unresponsive and  with O2 sats in 70s. Pt was given an alb neb treatment and EMS was called and pt was found with O2 sats in 70s and CPAP was initiated by EMS with improvement of O2 sats to 88% on CPAP. Pt arrives to ED with O2 sats in 80s on CPAP, tachypneic and minimally responsive. [facility] EMS [facility] and was signed by pt's wife on Sept 8, 2018 and specifies DNR and DNI.

## 2018-09-19 NOTE — H&P ADULT - FAMILY HISTORY
No pertinent family history in first degree relatives No pertinent family history in first degree relatives, PATIENT UNABLE TO PROVIDE FAMILY HX

## 2018-09-19 NOTE — H&P ADULT - HISTORY OF PRESENT ILLNESS
This is a 89yo M  w/ PMH Afib (on Apixaban), Parkinson's, BPH, HTN and ETOH abuse, DNR/DNI with recent prolonged hospitalization at [facility] for toxic metabolic encephalopathy presents from [facility] after he was found to be in resp distress, unresponsive and  with O2 sats in 70s. Pt was given an alb neb treatment and EMS was called and pt was found with O2 sats in 70s and CPAP was initiated by EMS with improvement of O2 sats to 88% on CPAP. Pt arrives to ED with O2 sats in 80s on CPAP, tachypneic and minimally responsive. History obtained from EMS and NH paperwork. [facility] paperwork on file with NH paperwork and was signed by pt's wife on Sept 8, 2018 and specifies DNR and DNI. This is a 89yo M w/ PMH significant for Afib (on Eliquis), Parkinson's dz, and recent prolonged hospitalization at [facility] for toxic metabolic encephalopathy (8/2018-9/7/18) who is presenting from [facility] after he was found unresponsive in acute hypoxic respiratory distress, with reported SpO2 in 70s. History almost exclusively through charts and wife at bedside as patient currently minimally responsive.     Per charts, pt was given albuterol neb treatment at NH and EMS was called. Non-invasive ventilation was initiated by EMS, w/ improvement of SpO2 to 88% on NiV. Per ED provider he arrived to [facility] ED w/ SpO2 in 80s, on NiV FiO2 100%, tachypneic and minimally responsive. His wife, Angle Lutz, was at bedside and re-affirmed the patient's wishes to remain DNR/DNI and not to be escalated to ICU level of care, in line w/ the MOLST form that was completed during his previous [facility] admission earlier this month. Angle did request a trial of antimicrobial therapy and non-invasive medical management that could potentially ameliorate his current critical illness.    ED VS: T 98.4 (rectal) BP / P 103-142 R 25-30 SpO2 % on IPAP 14/EPAP 5 FiO2 100%  ED adm: 1L NS bolus, lasix 40mg IVP x1, vanc/zosyn, nitro transdermal, labetalol 20mg IVP x1, duonebs x2  Initial course: evaluated by palliative care in ED; BP improved s/p labetalol; remained tachypneic on NiV; spiked fever to 102.4 (axillary) upon arrival to medical hoang     ork on file with NH paperwork and was signed by pt's wife on Sept 8, 2018 and specifies DNR and DNI. This is a 89yo M w/ PMH significant for Afib (on Eliquis), Parkinson's dz, and recent prolonged hospitalization at [facility] for toxic metabolic encephalopathy (8/2018-9/7/18) who is presenting from [facility] after he was found unresponsive in acute hypoxic respiratory distress, with reported SpO2 in 70s. History almost exclusively through charts and wife at bedside as patient currently minimally responsive.     Per charts, pt was given albuterol neb treatment at NH and EMS was called. Non-invasive ventilation was initiated by EMS, w/ improvement of SpO2 to 88% on NiV. Per ED provider he arrived to [facility] ED w/ SpO2 in 80s, on NiV FiO2 100%, tachypneic and minimally responsive. His wife, Angle Lutz, was at bedside and re-affirmed the patient's wishes to remain DNR/DNI and not to be escalated to ICU level of care, in line w/ the MOLST form that was completed during his previous [facility] admission earlier this month. Angle did request a trial of antimicrobial therapy and non-invasive medical management that could potentially ameliorate his current critical illness.    ED VS: T 98.4 (rectal) BP / P 103-142 R 25-30 SpO2 % on IPAP 14/EPAP 5 FiO2 100%  ED adm: 1L NS bolus, lasix 40mg IVP x1, vanc/zosyn, nitro transdermal, labetalol 20mg IVP x1, duonebs x2  Initial course: evaluated by palliative care in ED; BP improved s/p labetalol; remained tachypneic on NiV; spiked fever to 102.4 (axillary) upon arrival to medical hoang

## 2018-09-19 NOTE — H&P ADULT - PROBLEM SELECTOR PLAN 5
Oliguric NILS concerning for developing ATN - likely 2/2 severe sepsis and conditions specified above; s/p lasix 40mg IVP x1 in ED with slight improvement from anuria to oliguria; still insufficient urine to obtain UA for urine studies  - in line with goals of care, will attempt limited and non-invasive evaluations of renal status via UA, lytes (if able to obtain) but will not pursue renal replacement therapy in event of fulminant renal failure

## 2018-09-19 NOTE — ED PROVIDER NOTE - CARE PLAN
Principal Discharge DX:	Respiratory distress  Secondary Diagnosis:	Congestive heart failure Principal Discharge DX:	Respiratory distress  Secondary Diagnosis:	Pulmonary congestion

## 2018-09-19 NOTE — ED PROVIDER NOTE - CRITICAL CARE PROVIDED
documentation/consult w/ pt's family directly relating to pts condition/consultation with other physicians/interpretation of diagnostic studies/additional history taking/direct patient care (not related to procedure)

## 2018-09-19 NOTE — H&P ADULT - NSHPLABSRESULTS_GEN_ALL_CORE
.  LABS:                         11.5   11.0  )-----------( 328      ( 19 Sep 2018 15:02 )             37.3     09-19    145  |  106  |  25<H>  ----------------------------<  183<H>  3.8   |  22  |  1.98<H>    Ca    9.7      19 Sep 2018 15:02    TPro  7.9  /  Alb  3.4  /  TBili  0.5  /  DBili  x   /  AST  21  /  ALT  <5<L>  /  AlkPhos  85  09-19    PT/INR - ( 19 Sep 2018 15:02 )   PT: 22.7 sec;   INR: 2.02          PTT - ( 19 Sep 2018 15:02 )  PTT:32.0 sec    CARDIAC MARKERS ( 19 Sep 2018 15:02 )  x     / 0.06 ng/mL / 61 U/L / x     / 2.4 ng/mL      Serum Pro-Brain Natriuretic Peptide: 3603 pg/mL (09-19 @ 15:02)    Lactate, Blood: 3.4 mmoL/L (09-19 @ 15:01)      RADIOLOGY, EKG & ADDITIONAL TESTS: Reviewed. .  LABS:                         11.5   11.0  )-----------( 328      ( 19 Sep 2018 15:02 )             37.3     09-19    145  |  106  |  25<H>  ----------------------------<  183<H>  3.8   |  22  |  1.98<H>    Ca    9.7      19 Sep 2018 15:02    TPro  7.9  /  Alb  3.4  /  TBili  0.5  /  DBili  x   /  AST  21  /  ALT  <5<L>  /  AlkPhos  85  09-19    PT/INR - ( 19 Sep 2018 15:02 )   PT: 22.7 sec;   INR: 2.02          PTT - ( 19 Sep 2018 15:02 )  PTT:32.0 sec    CARDIAC MARKERS ( 19 Sep 2018 15:02 )  x     / 0.06 ng/mL / 61 U/L / x     / 2.4 ng/mL      Serum Pro-Brain Natriuretic Peptide: 3603 pg/mL (09-19 @ 15:02)    Lactate, Blood: 3.4 mmoL/L (09-19 @ 15:01)      RADIOLOGY, EKG & ADDITIONAL TESTS: Reviewed.    EKG: Afib RVR at ventricular rate of 123bpm

## 2018-09-19 NOTE — H&P ADULT - ASSESSMENT
This is a 89yo M recently discharged on 9/7 after extended hospitalization for toxic-metabolic encephalopathy who presents from [facility] w/ acute hypoxic respiratory distress; ED course c/b HTNsive urgency and possible flash pulmonary edema, also meeting severe sepsis criteria with suspected source aspiration PNA, also with MOLST orders for DNR/DNI and limited medical interventions re-affirmed by wife/HCP, who is being admitted to [facility] medical service for further management.

## 2018-09-19 NOTE — H&P ADULT - PROBLEM SELECTOR PLAN 9
.  F: holding additional IVF for now as discussed above  E: replete lytes PRN to maintain K>4, Mg>2  N: strict NPO for now    DVT PPx: holding PPx for now per GOC w/ wife    ETHICS: DNR/DNI, no mews, no escalation of care, limited medical interventions and trial of ABx/IVF as deemed necessary per conversation with wife - MOLST reflective of this is in chart    DISPO: admit to regional medicine service

## 2018-09-19 NOTE — H&P ADULT - PROBLEM SELECTOR PLAN 10
.  1) PCP Contacted on Admission: NO - after hours; Name & Phone #: Dr. Reji Paulino [phone]  2) Date of Contact with PCP:  3) PCP Contacted at Discharge: (Y/N, N/A)  4) Summary of Handoff Given to PCP:   5) Post-Discharge Appointment Date and Location:

## 2018-09-19 NOTE — H&P ADULT - PROBLEM SELECTOR PLAN 4
Suspected; pt without significant history of CHF however respiratory status worsened further in the setting of significant HTNsive urgency/emergency while in ED; s/p lasix and discontinuation of fluids, still with minimal UOP  - in light of his tenuous volume and respiratory status, will consider additional lasix PRN but not continue standing fluid nor diuresis and instead perform serial re-assessments  - monitor CXR, UOP  - strict I/O, weights

## 2018-09-19 NOTE — ED ADULT NURSE REASSESSMENT NOTE - NS ED NURSE REASSESS COMMENT FT1
Pt remains in resus 1 , wife at bedside.  Vanco infusing as ordered.  Remains on cardiac monitor.  Minimum urine produced in chan cath, not enough to send for sample.  Patient noted to be rigorous, as per previous RN patient rigorous from entrance to ED, unable to get BP at this time, will continue to montior closely.

## 2018-09-19 NOTE — H&P ADULT - PROBLEM SELECTOR PLAN 2
Original presenting condition, possibly 2/2 aspiration PNA vs. HAP? Hypoxic despite NiV @ FiO2 100% that was further complicated by suspected flash pulmonary edema  - discussed extensively with wife at bedside regarding the use of BiPAP/NIV with respect to it not being something comfortable for the patient and its contraindication with minimal airway protection - she requests we continue it for now and will plan to de-escalate at a later time should our limited medical interventions fail to improve his condition  --> continue NiV  - monitor CXR Original presenting condition, possibly 2/2 aspiration PNA vs. HAP? Hypoxic despite NiV @ FiO2 100% that was further complicated by suspected flash pulmonary edema  - discussed extensively with wife at bedside regarding the use of BiPAP/NIV with respect to it not being something comfortable for the patient and its contraindication with minimal airway protection - she requests we continue it for now and will plan to de-escalate at a later time should our limited medical interventions fail to improve his condition  --> continue NiV  - monitor CXR  - morphine 1-2mg IVP q3hrs PRN resp distress, tachypnea, and/or air hunger

## 2018-09-19 NOTE — ED PROVIDER NOTE - ENMT, MLM
Airway patent, Nasal mucosa clear. Mouth with normal mucosa. Throat has no vesicles, no oropharyngeal exudates and uvula is midline. Airway patent

## 2018-09-19 NOTE — H&P ADULT - PROBLEM SELECTOR PLAN 8
Currently Initial HTNsive urgency s/p labetalol, with labile BP and somewhat more normotensive  - holding antiHTNsives  - vitals qday per GOC discussion

## 2018-09-19 NOTE — H&P ADULT - ATTENDING COMMENTS
patient seen and examined    reviewed vs, labs, available radiological reports/ studies, ekg     agree w/ PE findings as above w/ additions/ exceptions/ pertinent findings:   gen:   heent:   cvs:   lungs;   abd:   ext:     1. severe sepsis/ PNA/ respiratory failure: goals of care discussion with wife by Dr. Elias as detailed above, pt with grave prognosis; UNABLE TO GIVE 30cc/kg fluid resuscitation given signs of fluid overload/ possible flash pulmonary edema; per wife  limited intervention with antibiotics, IV fluids (if needed); morphine prn; on BiPAP; limited vital signs, limited blood draws as noted above;  monitor overnight ; follow up palliative recs      rest of plan as above patient seen and examined; appears to be more awake, follows commands minimally but waxing and waning ; repeat vitals done, BP 78/51, IY=730l, pox 100% on BiPAP    reviewed vs, labs, available radiological reports/ studies, ekg     agree w/ PE findings as above w/ additions/ exceptions/ pertinent findings:   gen: elderly male, ill appearing, on BIPAP, awoke to verbal and tactile stimuli initially was able to follow minimal commands (opending eyes, squeezing fingers, moving arms); more lethargic later during encounter. oriented to self  heent: pinpoint pupils, no photophobia b/l, on BiPAP, dry tongue, no JVD  cvs: tachycardia; nitro paste of left chest (removed)  lungs; bibasilar crackles R>L  abd: soft/ nt/nd   gu: no UO from chan   ext:  +1 pitting edema of lower ext    1. severe sepsis/ PNA/ respiratory failure: goals of care discussion with wife by Dr. Elias as detailed above, pt with grave prognosis; UNABLE TO GIVE 30cc/kg fluid resuscitation given signs of fluid overload/ possible setting of flash pulmonary edema; per wife  limited intervention with antibiotics, IV fluids (if needed as pt able to tolerate); morphine prn; on BiPAP; limited vital signs, limited blood draws, no escalation of care as noted above;  monitor overnight ; follow up palliative recs    2. given improvement in mental status and that pt hypotensive, tachycardic w/ dry MM, pox 100% on BiPAP will give 500cc bolus now and follow up VS, monitor fluid status, strict I/Os; will repeat CXR in AM ; EF normal on last ECHO     rest of plan as above

## 2018-09-19 NOTE — H&P ADULT - NSHPPHYSICALEXAM_GEN_ALL_CORE
.  VITAL SIGNS:  T(C): 39.1 (09-19-18 @ 18:30), Max: 39.1 (09-19-18 @ 18:30)  T(F): 102.4 (09-19-18 @ 18:30), Max: 102.4 (09-19-18 @ 18:30)  HR: 125 (09-19-18 @ 18:30) (103 - 142)  BP: 115/55 (09-19-18 @ 18:30) (98/59 - 246/112)  BP(mean): --  RR: 40 (09-19-18 @ 18:30) (25 - 40)  SpO2: 86% (09-19-18 @ 18:30) (86% - 100%)  Wt(kg): --    PHYSICAL EXAM:    Constitutional: ill-appearing male, rigoring and in acute distress on non-invasive ventilation  Head: NC/AT  Eyes: PERRL, anicteric sclera  ENT: dry MM, BiPAP facemask in place  Neck: supple; mild JVD  Respiratory: tachypneic to 35-45 on BiPAP, lungs w/ diffuse coarse crackles and rhonchi throughout all lung fields, using accessory muscles  Cardiac: +S1/S2; tachycardic and irregularly irregular  Gastrointestinal: abdomen soft and non-distended, no grimace to palpation of all quadrants; intact BS  Genitourinary: normal external genitalia w/ chan present on admission, minimal urine output noted in chan bag  Back: spine appears midline on gross inspection  Extremities: WWP, 2+ pitting LE edema, L heel ulcer present on admission  Musculoskeletal: deferred ROM testing; no appreciable joint erythema or warmth  Vascular: 2+ radial, femoral, 1+ DP/PT pulses irregular and thready  Dermatologic: skin warm, dry and intact; L heel ulcer  Neurologic: AAOx0; minimally responsive but grunts to name, but does not follow any commands; withdraws to pain  Psychiatric: unable to evaluate .  VITAL SIGNS:  T(C): 39.1 (09-19-18 @ 18:30), Max: 39.1 (09-19-18 @ 18:30)  T(F): 102.4 (09-19-18 @ 18:30), Max: 102.4 (09-19-18 @ 18:30)  HR: 125 (09-19-18 @ 18:30) (103 - 142)  BP: 115/55 (09-19-18 @ 18:30) (98/59 - 246/112)  BP(mean): --  RR: 40 (09-19-18 @ 18:30) (25 - 40)  SpO2: 86% (09-19-18 @ 18:30) (86% - 100%)  Wt(kg): --    PHYSICAL EXAM:    Constitutional: ill-appearing male, rigoring and in acute distress on non-invasive ventilation  Head: NC/AT  Eyes: PERRL, anicteric sclera  ENT: dry MM, BiPAP facemask in place  Neck: supple; mild JVD  Respiratory: tachypneic to 35-45 on BiPAP, lungs w/ diffuse coarse crackles and throughout all lung fields, using accessory muscles  Cardiac: +S1/S2; tachycardic and irregularly irregular  Gastrointestinal: abdomen soft and non-distended, no grimace to palpation of all quadrants; intact BS  Genitourinary: normal external genitalia w/ chan present on admission, minimal urine output noted in chan bag  Back: spine appears midline on gross inspection  Extremities: WWP, 2+ pitting LE edema, L heel ulcer present on admission  Musculoskeletal: deferred ROM testing; no appreciable joint erythema or warmth  Vascular: 2+ radial, femoral, 1+ DP/PT pulses irregular and thready  Dermatologic: skin warm, dry and intact; L heel ulcer  Neurologic: AAOx0; minimally responsive but grunts to name, but does not follow any commands; withdraws to pain  Psychiatric: unable to evaluate

## 2018-09-19 NOTE — ED PROVIDER NOTE - CHIEF COMPLAINT
The patient is a 89y Male complaining of difficulty breathing. The patient is a 89y Male p/w resp distress

## 2018-09-19 NOTE — ED PROVIDER NOTE - PROGRESS NOTE DETAILS
Wife arrived to ED and reiterated that pt is a DNR/ DNI. Pt started on BIPAP with duonebs nebs through BIPAP. Initial BP in ED 98/58. Repeat BP without any intervention was consistently 200s/100s and nitropaste was placed with improvement in BPs. CXR with pulm congestion. Pt was also given lasix. Resp rate and effort improved while in ED and pt confused, but spoke to wife. Dr. Magana in ED to see patient. (palliative care). Pt admitted to medicine.

## 2018-09-19 NOTE — ED ADULT TRIAGE NOTE - CHIEF COMPLAINT QUOTE
Pt BIBA from nursing home, pt was having occupational therapy and began to have difficulty breathing. Pt on bipap with EMS, is DNR/DNI.

## 2018-09-19 NOTE — ED PROVIDER NOTE - MEDICAL DECISION MAKING DETAILS
89 yo male with PMH of afib (on Apixaban), Parkinson's, BPH, HTN and ETOH abuse, DNR/DNI with recent prolonged hospitalization at [facility] for toxic metabolic encephalopathy presents from [facility] after he was found to be in resp distress, unresponsive and  with O2 sats in 70s. Pt was given an alb neb treatment and EMS was called and pt was found with O2 sats in 70s and CPAP was initiated by EMS with improvement of O2 sats to 88% on CPAP. Pt arrives to ED with O2 sats in 80s on CPAP, tachypneic and minimally responsive. History obtained from EMS and NH paperwork. [facility] paperwork on file with NH paperwork and was signed by pt's wife on Sept 8, 2018 and specifies DNR and DNI. Wife arrived to ED and reiterated that pt is a DNR/ DNI. Pt started on BIPAP with duonebs nebs through BIPAP. Initial BP in ED 98/58. Repeat BP without any intervention was consistently 200s/100s and nitropaste was placed and IV Labetalol given with improvement in BPs. CXR with pulm congestion. Pt was also given lasix. Resp rate and effort improved while in ED and pt confused, but spoke a couple of words to wife. Dr. Magana in ED to see patient. (palliative care). Pt admitted to medicine.

## 2018-09-19 NOTE — ED ADULT NURSE NOTE - OBJECTIVE STATEMENT
89 y/o male BIBEMS from nursing home for SOB and difficulty breathing beginning while at occupational therapy today. pt is DNR/DNI, MD Lowery aware, social work at bedside with wife. pt tachypenic, hypoxic, placed on BIPAP machine with o2 sat improvement to 100%. pt afebrile. pt recognizes caregiver however per wife, pt has been "in and out of it a lot lately." when asked if in any pain, pt states "my back."

## 2018-09-19 NOTE — H&P ADULT - NSHPSOCIALHISTORY_GEN_ALL_CORE
.  History of EtOH abuse via charts; unable to obtain additional SHx FROM Nursing HOME  History of EtOH abuse via charts; unable to obtain additional SHX

## 2018-09-19 NOTE — ED PROVIDER NOTE - NEUROLOGICAL, MLM
Alert and oriented, no focal deficits, no motor or sensory deficits. Unresponsive, not following commands

## 2018-09-20 DIAGNOSIS — N17.0 ACUTE KIDNEY FAILURE WITH TUBULAR NECROSIS: ICD-10-CM

## 2018-09-20 DIAGNOSIS — R62.7 ADULT FAILURE TO THRIVE: ICD-10-CM

## 2018-09-20 DIAGNOSIS — J69.0 PNEUMONITIS DUE TO INHALATION OF FOOD AND VOMIT: ICD-10-CM

## 2018-09-20 DIAGNOSIS — Z51.5 ENCOUNTER FOR PALLIATIVE CARE: ICD-10-CM

## 2018-09-20 LAB
ALBUMIN SERPL ELPH-MCNC: 3 G/DL — LOW (ref 3.3–5)
ALP SERPL-CCNC: 60 U/L — SIGNIFICANT CHANGE UP (ref 40–120)
ALT FLD-CCNC: <5 U/L — LOW (ref 10–45)
ANION GAP SERPL CALC-SCNC: 17 MMOL/L — SIGNIFICANT CHANGE UP (ref 5–17)
APPEARANCE UR: ABNORMAL
AST SERPL-CCNC: 21 U/L — SIGNIFICANT CHANGE UP (ref 10–40)
BACTERIA # UR AUTO: ABNORMAL /HPF
BILIRUB SERPL-MCNC: 0.9 MG/DL — SIGNIFICANT CHANGE UP (ref 0.2–1.2)
BILIRUB UR-MCNC: NEGATIVE — SIGNIFICANT CHANGE UP
BUN SERPL-MCNC: 37 MG/DL — HIGH (ref 7–23)
CALCIUM SERPL-MCNC: 8.8 MG/DL — SIGNIFICANT CHANGE UP (ref 8.4–10.5)
CHLORIDE SERPL-SCNC: 109 MMOL/L — HIGH (ref 96–108)
CO2 SERPL-SCNC: 21 MMOL/L — LOW (ref 22–31)
COLOR SPEC: YELLOW — SIGNIFICANT CHANGE UP
CREAT SERPL-MCNC: 3.22 MG/DL — HIGH (ref 0.5–1.3)
DIFF PNL FLD: ABNORMAL
EPI CELLS # UR: SIGNIFICANT CHANGE UP /HPF (ref 0–5)
GLUCOSE SERPL-MCNC: 113 MG/DL — HIGH (ref 70–99)
GLUCOSE UR QL: NEGATIVE — SIGNIFICANT CHANGE UP
HCT VFR BLD CALC: 31.3 % — LOW (ref 39–50)
HGB BLD-MCNC: 9.6 G/DL — LOW (ref 13–17)
KETONES UR-MCNC: ABNORMAL MG/DL
LEUKOCYTE ESTERASE UR-ACNC: ABNORMAL
LYMPHOCYTES # BLD AUTO: 9 % — LOW (ref 13–44)
MAGNESIUM SERPL-MCNC: 1.6 MG/DL — SIGNIFICANT CHANGE UP (ref 1.6–2.6)
MCHC RBC-ENTMCNC: 30.5 PG — SIGNIFICANT CHANGE UP (ref 27–34)
MCHC RBC-ENTMCNC: 30.7 G/DL — LOW (ref 32–36)
MCV RBC AUTO: 99.4 FL — SIGNIFICANT CHANGE UP (ref 80–100)
MONOCYTES NFR BLD AUTO: 6 % — SIGNIFICANT CHANGE UP (ref 2–14)
NEUTROPHILS NFR BLD AUTO: 25 % — LOW (ref 43–77)
NITRITE UR-MCNC: POSITIVE
PH UR: 5.5 — SIGNIFICANT CHANGE UP (ref 5–8)
PHOSPHATE SERPL-MCNC: 3.7 MG/DL — SIGNIFICANT CHANGE UP (ref 2.5–4.5)
PLATELET # BLD AUTO: 167 K/UL — SIGNIFICANT CHANGE UP (ref 150–400)
POTASSIUM SERPL-MCNC: 4.5 MMOL/L — SIGNIFICANT CHANGE UP (ref 3.5–5.3)
POTASSIUM SERPL-SCNC: 4.5 MMOL/L — SIGNIFICANT CHANGE UP (ref 3.5–5.3)
PROT SERPL-MCNC: 6.6 G/DL — SIGNIFICANT CHANGE UP (ref 6–8.3)
PROT UR-MCNC: >=300 MG/DL
RBC # BLD: 3.15 M/UL — LOW (ref 4.2–5.8)
RBC # FLD: 13.8 % — SIGNIFICANT CHANGE UP (ref 10.3–16.9)
RBC CASTS # UR COMP ASSIST: > 10 /HPF
SODIUM SERPL-SCNC: 147 MMOL/L — HIGH (ref 135–145)
SP GR SPEC: >=1.03 — SIGNIFICANT CHANGE UP (ref 1–1.03)
UROBILINOGEN FLD QL: 0.2 E.U./DL — SIGNIFICANT CHANGE UP
WBC # BLD: 7.6 K/UL — SIGNIFICANT CHANGE UP (ref 3.8–10.5)
WBC # FLD AUTO: 7.6 K/UL — SIGNIFICANT CHANGE UP (ref 3.8–10.5)
WBC UR QL: ABNORMAL /HPF

## 2018-09-20 PROCEDURE — 99233 SBSQ HOSP IP/OBS HIGH 50: CPT | Mod: GC

## 2018-09-20 PROCEDURE — 71045 X-RAY EXAM CHEST 1 VIEW: CPT | Mod: 26

## 2018-09-20 PROCEDURE — 99223 1ST HOSP IP/OBS HIGH 75: CPT

## 2018-09-20 RX ORDER — MORPHINE SULFATE 50 MG/1
1 CAPSULE, EXTENDED RELEASE ORAL ONCE
Qty: 0 | Refills: 0 | Status: DISCONTINUED | OUTPATIENT
Start: 2018-09-20 | End: 2018-09-20

## 2018-09-20 RX ORDER — AMLODIPINE BESYLATE AND BENAZEPRIL HYDROCHLORIDE 10; 20 MG/1; MG/1
1 CAPSULE ORAL
Qty: 0 | Refills: 0 | COMMUNITY

## 2018-09-20 RX ORDER — HYDROMORPHONE HYDROCHLORIDE 2 MG/ML
1 INJECTION INTRAMUSCULAR; INTRAVENOUS; SUBCUTANEOUS EVERY 4 HOURS
Qty: 0 | Refills: 0 | Status: DISCONTINUED | OUTPATIENT
Start: 2018-09-20 | End: 2018-09-21

## 2018-09-20 RX ORDER — HYDROMORPHONE HYDROCHLORIDE 2 MG/ML
1 INJECTION INTRAMUSCULAR; INTRAVENOUS; SUBCUTANEOUS
Qty: 0 | Refills: 0 | COMMUNITY
Start: 2018-09-20

## 2018-09-20 RX ORDER — FINASTERIDE 5 MG/1
1 TABLET, FILM COATED ORAL
Qty: 0 | Refills: 0 | COMMUNITY

## 2018-09-20 RX ORDER — SODIUM CHLORIDE 9 MG/ML
1000 INJECTION INTRAMUSCULAR; INTRAVENOUS; SUBCUTANEOUS
Qty: 0 | Refills: 0 | Status: DISCONTINUED | OUTPATIENT
Start: 2018-09-20 | End: 2018-09-20

## 2018-09-20 RX ORDER — SODIUM CHLORIDE 9 MG/ML
500 INJECTION INTRAMUSCULAR; INTRAVENOUS; SUBCUTANEOUS ONCE
Qty: 0 | Refills: 0 | Status: COMPLETED | OUTPATIENT
Start: 2018-09-20 | End: 2018-09-20

## 2018-09-20 RX ORDER — HYDROMORPHONE HYDROCHLORIDE 2 MG/ML
0.5 INJECTION INTRAMUSCULAR; INTRAVENOUS; SUBCUTANEOUS
Qty: 0 | Refills: 0 | Status: DISCONTINUED | OUTPATIENT
Start: 2018-09-20 | End: 2018-09-21

## 2018-09-20 RX ORDER — SODIUM CHLORIDE 9 MG/ML
500 INJECTION INTRAMUSCULAR; INTRAVENOUS; SUBCUTANEOUS ONCE
Qty: 0 | Refills: 0 | Status: DISCONTINUED | OUTPATIENT
Start: 2018-09-20 | End: 2018-09-20

## 2018-09-20 RX ORDER — MORPHINE SULFATE 50 MG/1
2 CAPSULE, EXTENDED RELEASE ORAL
Qty: 0 | Refills: 0 | Status: DISCONTINUED | OUTPATIENT
Start: 2018-09-20 | End: 2018-09-20

## 2018-09-20 RX ORDER — CHLORHEXIDINE GLUCONATE 213 G/1000ML
1 SOLUTION TOPICAL DAILY
Qty: 0 | Refills: 0 | Status: DISCONTINUED | OUTPATIENT
Start: 2018-09-21 | End: 2018-09-21

## 2018-09-20 RX ORDER — MORPHINE SULFATE 50 MG/1
21 CAPSULE, EXTENDED RELEASE ORAL
Qty: 0 | Refills: 0 | Status: DISCONTINUED | OUTPATIENT
Start: 2018-09-20 | End: 2018-09-20

## 2018-09-20 RX ORDER — TAMSULOSIN HYDROCHLORIDE 0.4 MG/1
2 CAPSULE ORAL
Qty: 0 | Refills: 0 | COMMUNITY

## 2018-09-20 RX ORDER — HYDROMORPHONE HYDROCHLORIDE 2 MG/ML
0.5 INJECTION INTRAMUSCULAR; INTRAVENOUS; SUBCUTANEOUS
Qty: 0 | Refills: 0 | COMMUNITY
Start: 2018-09-20

## 2018-09-20 RX ORDER — CARBIDOPA AND LEVODOPA 25; 100 MG/1; MG/1
1 TABLET ORAL
Qty: 0 | Refills: 0 | COMMUNITY

## 2018-09-20 RX ADMIN — SODIUM CHLORIDE 1000 MILLILITER(S): 9 INJECTION INTRAMUSCULAR; INTRAVENOUS; SUBCUTANEOUS at 01:14

## 2018-09-20 RX ADMIN — Medication 1 APPLICATION(S): at 13:27

## 2018-09-20 RX ADMIN — HYDROMORPHONE HYDROCHLORIDE 0.5 MILLIGRAM(S): 2 INJECTION INTRAMUSCULAR; INTRAVENOUS; SUBCUTANEOUS at 13:42

## 2018-09-20 RX ADMIN — MORPHINE SULFATE 1 MILLIGRAM(S): 50 CAPSULE, EXTENDED RELEASE ORAL at 02:00

## 2018-09-20 RX ADMIN — MORPHINE SULFATE 2 MILLIGRAM(S): 50 CAPSULE, EXTENDED RELEASE ORAL at 09:31

## 2018-09-20 RX ADMIN — Medication 0.5 INCH(S): at 03:32

## 2018-09-20 RX ADMIN — PIPERACILLIN AND TAZOBACTAM 200 GRAM(S): 4; .5 INJECTION, POWDER, LYOPHILIZED, FOR SOLUTION INTRAVENOUS at 06:15

## 2018-09-20 RX ADMIN — SODIUM CHLORIDE 90 MILLILITER(S): 9 INJECTION INTRAMUSCULAR; INTRAVENOUS; SUBCUTANEOUS at 06:15

## 2018-09-20 RX ADMIN — HYDROMORPHONE HYDROCHLORIDE 1 MILLIGRAM(S): 2 INJECTION INTRAMUSCULAR; INTRAVENOUS; SUBCUTANEOUS at 17:59

## 2018-09-20 RX ADMIN — HYDROMORPHONE HYDROCHLORIDE 0.5 MILLIGRAM(S): 2 INJECTION INTRAMUSCULAR; INTRAVENOUS; SUBCUTANEOUS at 19:45

## 2018-09-20 RX ADMIN — MORPHINE SULFATE 1 MILLIGRAM(S): 50 CAPSULE, EXTENDED RELEASE ORAL at 02:40

## 2018-09-20 RX ADMIN — MORPHINE SULFATE 1 MILLIGRAM(S): 50 CAPSULE, EXTENDED RELEASE ORAL at 02:38

## 2018-09-20 RX ADMIN — HYDROMORPHONE HYDROCHLORIDE 1 MILLIGRAM(S): 2 INJECTION INTRAMUSCULAR; INTRAVENOUS; SUBCUTANEOUS at 17:44

## 2018-09-20 RX ADMIN — MORPHINE SULFATE 1 MILLIGRAM(S): 50 CAPSULE, EXTENDED RELEASE ORAL at 05:19

## 2018-09-20 RX ADMIN — HYDROMORPHONE HYDROCHLORIDE 1 MILLIGRAM(S): 2 INJECTION INTRAMUSCULAR; INTRAVENOUS; SUBCUTANEOUS at 11:46

## 2018-09-20 RX ADMIN — HYDROMORPHONE HYDROCHLORIDE 0.5 MILLIGRAM(S): 2 INJECTION INTRAMUSCULAR; INTRAVENOUS; SUBCUTANEOUS at 13:27

## 2018-09-20 RX ADMIN — HYDROMORPHONE HYDROCHLORIDE 0.5 MILLIGRAM(S): 2 INJECTION INTRAMUSCULAR; INTRAVENOUS; SUBCUTANEOUS at 19:35

## 2018-09-20 RX ADMIN — MORPHINE SULFATE 2 MILLIGRAM(S): 50 CAPSULE, EXTENDED RELEASE ORAL at 09:16

## 2018-09-20 RX ADMIN — HYDROMORPHONE HYDROCHLORIDE 1 MILLIGRAM(S): 2 INJECTION INTRAMUSCULAR; INTRAVENOUS; SUBCUTANEOUS at 12:01

## 2018-09-20 RX ADMIN — MORPHINE SULFATE 1 MILLIGRAM(S): 50 CAPSULE, EXTENDED RELEASE ORAL at 01:12

## 2018-09-20 NOTE — DISCHARGE NOTE ADULT - MEDICATION SUMMARY - MEDICATIONS TO TAKE
I will START or STAY ON the medications listed below when I get home from the hospital:    HYDROmorphone  -- 1 milligram(s) intravenously every 4 hours, As Needed - for shortness of breath and/or wheezing, for moderate pain  -- Indication: For Palliative care by specialist    HYDROmorphone  -- 0.5 milligram(s) intravenous every 2 hours, As Needed - for severe pain, for shortness of breath and/or wheezing  -- Indication: For Palliative care by specialist    LORazepam  -- 2 milligram(s) intravenous every 4 hours, As Needed - for agitation  -- Indication: For Palliative care by specialist

## 2018-09-20 NOTE — CONSULT NOTE ADULT - PROBLEM SELECTOR RECOMMENDATION 2
patient presents with severe sepsis from pneumonia with hypotension, renal failure.  Initially with fluid resuscitation then developed hypertension.  Presently on maintenance fluids.  Antibiotics to be stopped consistent with treatment goal of comfort

## 2018-09-20 NOTE — DISCHARGE NOTE ADULT - HOSPITAL COURSE
89yo M w/ PMH significant for Afib (on Eliquis), Parkinson's dz, and recent prolonged hospitalization at [facility] for toxic metabolic encephalopathy (8/2018-9/7/18) who is presenting from [facility] after he was found unresponsive in acute hypoxic respiratory distress, with reported SpO2 in 70s. History almost exclusively through charts and wife at bedside as patient currently minimally responsive.   He was started on BiPAP in EMS on the way to the ED.  Was anuric in ED, with RR of 45 and in acute distress.  BP was initially low, given IVF, and SBP went to 200-250.  Given nitro patch and 20 Labetalol afterwards went into flash pulmonary edema, associated with 2 + pedal edema, elevated proBNP, elevated Lactate.  Transitioned from BiPAP to HFNC when he got to the floor.  Initially treated for what was presumed to be aspiration pneumonia; however, discussions with primary team and Palliative care and wife decided that patient would undergo comfort care measures.  He was placed on a comfortable pain regimen and will be discharged to [facility].

## 2018-09-20 NOTE — PROGRESS NOTE ADULT - SUBJECTIVE AND OBJECTIVE BOX
NOTE IS IN PROGRESS  OVERNIGHT EVENTS:  Overnight received 3 X500 cc NS bolus, 3 X 1mg morphine, started on mIVF 90 cc/hr NS, transitioned from BiPAP to HFNC, lethargic, combative, needed restraints, worsening crackles.    SUBJECTIVE / INTERVAL HPI: Patient seen and examined at bedside.     VITAL SIGNS:  Vital Signs Last 24 Hrs  T(C): 36.8 (20 Sep 2018 08:52), Max: 39.1 (19 Sep 2018 18:30)  T(F): 98.3 (20 Sep 2018 08:52), Max: 102.4 (19 Sep 2018 18:30)  HR: 121 (20 Sep 2018 09:06) (78 - 142)  BP: 91/60 (20 Sep 2018 08:52) (78/51 - 246/112)  BP(mean): --  RR: 35 (20 Sep 2018 09:06) (25 - 40)  SpO2: 96% (20 Sep 2018 09:06) (86% - 100%)    PHYSICAL EXAM:    	Constitutional: ill-appearing male,in acute respiratory distress on HFNC  	Head: NC/AT  	Eyes: PERRL, anicteric sclera  	ENT: dry MM,  	Neck: supple; mild JVD above clavicle  	Respiratory: tachypneic to 26 on HFNC, lungs w/ diffuse coarse crackles and throughout all lung fields, using accessory muscles  	Cardiac: +S1/S2; tachycardic and irregularly irregular  	Gastrointestinal: abdomen soft and non-distended, grimace to RUQ palpation; intact BS  	Genitourinary:  minimal urine output noted in chan bag  	Extremities: cool to touch, no significant LE edema noted, L heel ulcer present on admission  	Musculoskeletal: deferred ROM testing; no appreciable joint erythema or warmth  	Vascular: 2+ radial, femoral, 1+ DP/PT pulses irregular and thready  	Dermatologic: skin warm, dry and intact; L heel ulcer  	Neurologic: AAOx0; minimally responsive but grunts to name, follows some commands with yes; grimaces to pain; intermittently lucid  Psychiatric: unable to evaluate    MEDICATIONS:  MEDICATIONS  (STANDING):  vitamin A &amp; D Ointment 1 Application(s) Topical daily    MEDICATIONS  (PRN):  HYDROmorphone  Injectable 1 milliGRAM(s) IV Push every 4 hours PRN SOB or pain or discomfort  HYDROmorphone  Injectable 0.5 milliGRAM(s) IV Push every 2 hours PRN if additional needed for SOB or discomfort  LORazepam   Injectable 2 milliGRAM(s) IV Push every 4 hours PRN agitatoin      ALLERGIES:  Allergies    aspirin (Unknown)    Intolerances        LABS:                        9.6    7.6   )-----------( 167      ( 20 Sep 2018 07:48 )             31.3     09-20    147<H>  |  109<H>  |  37<H>  ----------------------------<  113<H>  4.5   |  21<L>  |  3.22<H>    Ca    8.8      20 Sep 2018 07:48  Phos  3.7     09-20  Mg     1.6     09-20    TPro  6.6  /  Alb  3.0<L>  /  TBili  0.9  /  DBili  x   /  AST  21  /  ALT  <5<L>  /  AlkPhos  60  09-20    PT/INR - ( 19 Sep 2018 15:02 )   PT: 22.7 sec;   INR: 2.02          PTT - ( 19 Sep 2018 15:02 )  PTT:32.0 sec  Urinalysis Basic - ( 20 Sep 2018 09:30 )    Color: Yellow / Appearance: Cloudy / SG: >=1.030 / pH: x  Gluc: x / Ketone: Trace mg/dL  / Bili: Negative / Urobili: 0.2 E.U./dL   Blood: x / Protein: >=300 mg/dL / Nitrite: POSITIVE   Leuk Esterase: Moderate / RBC: > 10 /HPF / WBC Many /HPF   Sq Epi: x / Non Sq Epi: 0-5 /HPF / Bacteria: Many /HPF      CAPILLARY BLOOD GLUCOSE          RADIOLOGY & ADDITIONAL TESTS: Reviewed.    ASSESSMENT:    PLAN: OVERNIGHT EVENTS:  Overnight received 3 X500 cc NS bolus, 3 X 1mg morphine, started on mIVF 90 cc/hr NS, transitioned from BiPAP to HFNC, lethargic, combative, needed restraints, worsening crackles.    SUBJECTIVE / INTERVAL HPI: Patient seen and examined at bedside.     VITAL SIGNS:  Vital Signs Last 24 Hrs  T(C): 36.8 (20 Sep 2018 08:52), Max: 39.1 (19 Sep 2018 18:30)  T(F): 98.3 (20 Sep 2018 08:52), Max: 102.4 (19 Sep 2018 18:30)  HR: 121 (20 Sep 2018 09:06) (78 - 142)  BP: 91/60 (20 Sep 2018 08:52) (78/51 - 246/112)  BP(mean): --  RR: 35 (20 Sep 2018 09:06) (25 - 40)  SpO2: 96% (20 Sep 2018 09:06) (86% - 100%)    PHYSICAL EXAM:    	Constitutional: ill-appearing male,in acute respiratory distress on HFNC  	Head: NC/AT  	Eyes: PERRL, anicteric sclera  	ENT: dry MM,  	Neck: supple; mild JVD above clavicle  	Respiratory: tachypneic to 26 on HFNC, lungs w/ diffuse coarse crackles and throughout all lung fields, using accessory muscles  	Cardiac: +S1/S2; tachycardic and irregularly irregular  	Gastrointestinal: abdomen soft and non-distended, grimace to RUQ palpation; intact BS  	Genitourinary:  minimal urine output noted in chan bag  	Extremities: cool to touch, no significant LE edema noted, L heel ulcer present on admission  	Musculoskeletal: deferred ROM testing; no appreciable joint erythema or warmth  	Vascular: 2+ radial, femoral, 1+ DP/PT pulses irregular and thready  	Dermatologic: skin warm, dry and intact; L heel ulcer  	Neurologic: AAOx0; minimally responsive but grunts to name, follows some commands with yes; grimaces to pain; intermittently lucid  Psychiatric: unable to evaluate    MEDICATIONS:  MEDICATIONS  (STANDING):  vitamin A &amp; D Ointment 1 Application(s) Topical daily    MEDICATIONS  (PRN):  HYDROmorphone  Injectable 1 milliGRAM(s) IV Push every 4 hours PRN SOB or pain or discomfort  HYDROmorphone  Injectable 0.5 milliGRAM(s) IV Push every 2 hours PRN if additional needed for SOB or discomfort  LORazepam   Injectable 2 milliGRAM(s) IV Push every 4 hours PRN agitatoin      ALLERGIES:  Allergies    aspirin (Unknown)    Intolerances        LABS:                        9.6    7.6   )-----------( 167      ( 20 Sep 2018 07:48 )             31.3     09-20    147<H>  |  109<H>  |  37<H>  ----------------------------<  113<H>  4.5   |  21<L>  |  3.22<H>    Ca    8.8      20 Sep 2018 07:48  Phos  3.7     09-20  Mg     1.6     09-20    TPro  6.6  /  Alb  3.0<L>  /  TBili  0.9  /  DBili  x   /  AST  21  /  ALT  <5<L>  /  AlkPhos  60  09-20    PT/INR - ( 19 Sep 2018 15:02 )   PT: 22.7 sec;   INR: 2.02          PTT - ( 19 Sep 2018 15:02 )  PTT:32.0 sec  Urinalysis Basic - ( 20 Sep 2018 09:30 )    Color: Yellow / Appearance: Cloudy / SG: >=1.030 / pH: x  Gluc: x / Ketone: Trace mg/dL  / Bili: Negative / Urobili: 0.2 E.U./dL   Blood: x / Protein: >=300 mg/dL / Nitrite: POSITIVE   Leuk Esterase: Moderate / RBC: > 10 /HPF / WBC Many /HPF   Sq Epi: x / Non Sq Epi: 0-5 /HPF / Bacteria: Many /HPF      CAPILLARY BLOOD GLUCOSE          RADIOLOGY & ADDITIONAL TESTS: Reviewed.    ASSESSMENT:    PLAN:

## 2018-09-20 NOTE — CONSULT NOTE ADULT - SUBJECTIVE AND OBJECTIVE BOX
SADIA LUTZ   MRN-[id number]     (mm/dd/yyyy):     HPI:  This is a 89yo M w/ PMH significant for Afib (on Eliquis), Parkinson's dz, and recent prolonged hospitalization at [facility] for toxic metabolic encephalopathy (2018-18) who is presenting from [facility] after he was found unresponsive in acute hypoxic respiratory distress, with reported SpO2 in 70s. History almost exclusively through charts and wife at bedside as patient currently minimally responsive.     Per charts, pt was given albuterol neb treatment at NH and EMS was called. Non-invasive ventilation was initiated by EMS, w/ improvement of SpO2 to 88% on NiV. Per ED provider he arrived to [facility] ED w/ SpO2 in 80s, on NiV FiO2 100%, tachypneic and minimally responsive. His wife, Angle Lutz, was at bedside and re-affirmed the patient's wishes to remain DNR/DNI and not to be escalated to ICU level of care, in line w/ the MOLST form that was completed during his previous [facility] admission earlier this month. Angle did request a trial of antimicrobial therapy and non-invasive medical management that could potentially ameliorate his current critical illness.    ED VS: T 98.4 (rectal) BP / P 103-142 R 25-30 SpO2 % on IPAP 14/EPAP 5 FiO2 100%  ED adm: 1L NS bolus, lasix 40mg IVP x1, vanc/zosyn, nitro transdermal, labetalol 20mg IVP x1, duonebs x2  Initial course: evaluated by palliative care in ED; BP improved s/p labetalol; remained tachypneic on NiV; spiked fever to 102.4 (axillary) upon arrival to medical hoang (19 Sep 2018 17:42)      PAST MEDICAL & SURGICAL HISTORY:  Parkinson's disease  BPH (benign prostatic hyperplasia)  HLD (hyperlipidemia)  HTN (hypertension)  Atrial fibrillation  No significant past surgical history      FAMILY HISTORY:  No pertinent family history in first degree relatives: PATIENT UNABLE TO PROVIDE FAMILY HX    Today patient is on high flow nasal cannula, is answering questions, but is unable to provide appropriate answers.  He is being treated for severe sepsis, presumably from aspiration PNA.  Wife reports that patient had done a very little bit better at [place]- was sometimes more coherent, was walking very short distances with walker.  She has not noted any rigors today    In previous conversations and reitterated today, wife wants patient to be comfortable.  She does not see any overall improvement in his condition and feels that he will not return to his previous level of function.  We would agree.    ROS:    Unable to attain due to:  confusion                    Dyspnea (Carissa 0-10):   appears SOB, on high flow.  just received 2 mg IV morphine                     N/V (Y/N):      N                        Secretions (Y/N) :         N       Agitation(Y/N):   yes  Pain (Y/N):     appears uncomfortable overall, cannot provide details  -Provocation/Palliation:  -Quality/Quantity:  -Radiating:  -Severity:  -Timing/Frequency:  -Impact on ADLs:   Remainder of ROS unobtainable due to confusion.  Known details are above    Allergies    aspirin (Unknown)    Intolerances        Opiate Naive (Y/N): yes  :    Medications:      MEDICATIONS  (STANDING):  vitamin A &amp; D Ointment 1 Application(s) Topical daily    MEDICATIONS  (PRN):  HYDROmorphone  Injectable 1 milliGRAM(s) IV Push every 4 hours PRN SOB or pain or discomfort  HYDROmorphone  Injectable 0.5 milliGRAM(s) IV Push every 2 hours PRN if additional needed for SOB or discomfort  LORazepam   Injectable 2 milliGRAM(s) IV Push every 4 hours PRN agitatoin      Labs:    CBC:                        9.6    7.6   )-----------( 167      ( 20 Sep 2018 07:48 )             31.3     CMP:        147<H>  |  109<H>  |  37<H>  ----------------------------<  113<H>  4.5   |  21<L>  |  3.22<H>    Ca    8.8      20 Sep 2018 07:48  Phos  3.7     -  Mg     1.6         TPro  6.6  /  Alb  3.0<L>  /  TBili  0.9  /  DBili  x   /  AST  21  /  ALT  <5<L>  /  AlkPhos  60  -    PT/INR - ( 19 Sep 2018 15:02 )   PT: 22.7 sec;   INR: 2.02          PTT - ( 19 Sep 2018 15:02 )  PTT:32.0 sec  Urinalysis Basic - ( 20 Sep 2018 09:30 )    Color: Yellow / Appearance: Cloudy / SG: >=1.030 / pH: x  Gluc: x / Ketone: Trace mg/dL  / Bili: Negative / Urobili: 0.2 E.U./dL   Blood: x / Protein: >=300 mg/dL / Nitrite: POSITIVE   Leuk Esterase: Moderate / RBC: > 10 /HPF / WBC Many /HPF   Sq Epi: x / Non Sq Epi: 0-5 /HPF / Bacteria: Many /HPF        Imaging:  Reviewed    PEx:  T(C): 36.8 (18 @ 08:52), Max: 39.1 (18 @ 18:30)  HR: 121 (18 @ 09:06) (78 - 142)  BP: 91/60 (18 @ 08:52) (78/51 - 246/112)  RR: 35 (18 @ 09:06) (25 - 40)  SpO2: 96% (18 @ 09:06) (86% - 100%)  Wt(kg): --  Daily Height in cm: 166.37 (19 Sep 2018 18:30)    Daily   CAPILLARY BLOOD GLUCOSE        I&O's Summary      General: ill appearing, on high flow nasal cannula, appears uncomfotable  HEENT:  plethoric, mucous membranes dry  Neck: supple, no adenopathy  CVS:  tachycardic  Resp: decreased at bases  GI:  mildly distended, non tender, normal BS  :  Good, minimal urine output  Musc:   sarcopenia  Neuro: non-focal, answering some questions, but not oriented except to self  Psych:   agitated  Skin:  numerous areas of sun damage  Lymph:no adenopathy  Preadmit Karnofsky:                            40 %           Current Karnofsky:    20 %  Cachexia (Y/N): no  BMI:    Advanced Directives:       DNR/DNI     MOLST      Decision maker: Wife, Aline Lutz  Legal surrogate: same    Social History: , history of ETOH abuse, but no alcohol for about one month    GOALS OF CARE DISCUSSION       Palliative care info/counseling provided	               Documentation of GOC: 	Comfort at what appears to be the EOL          REFERRALS	        Palliative Med   y       Hospice SADIA LUTZ   MRN-[id number]     ([id number]):     HPI:  This is a 89yo M w/ PMH significant for Afib (on Eliquis), Parkinson's dz, and recent prolonged hospitalization at [facility] for toxic metabolic encephalopathy (2018-18) who is presenting from [facility] after he was found unresponsive in acute hypoxic respiratory distress, with reported SpO2 in 70s. History almost exclusively through charts and wife at bedside as patient currently minimally responsive.     Per charts, pt was given albuterol neb treatment at NH and EMS was called. Non-invasive ventilation was initiated by EMS, w/ improvement of SpO2 to 88% on NiV. Per ED provider he arrived to [facility] ED w/ SpO2 in 80s, on NiV FiO2 100%, tachypneic and minimally responsive. His wife, Angle Lutz, was at bedside and re-affirmed the patient's wishes to remain DNR/DNI and not to be escalated to ICU level of care, in line w/ the MOLST form that was completed during his previous [facility] admission earlier this month. Angle did request a trial of antimicrobial therapy and non-invasive medical management that could potentially ameliorate his current critical illness.    ED VS: T 98.4 (rectal) BP / P 103-142 R 25-30 SpO2 % on IPAP 14/EPAP 5 FiO2 100%  ED adm: 1L NS bolus, lasix 40mg IVP x1, vanc/zosyn, nitro transdermal, labetalol 20mg IVP x1, duonebs x2  Initial course: evaluated by palliative care in ED; BP improved s/p labetalol; remained tachypneic on NiV; spiked fever to 102.4 (axillary) upon arrival to medical hoang (19 Sep 2018 17:42)      PAST MEDICAL & SURGICAL HISTORY:  Parkinson's disease  BPH (benign prostatic hyperplasia)  HLD (hyperlipidemia)  HTN (hypertension)  Atrial fibrillation  No significant past surgical history      FAMILY HISTORY:  No pertinent family history in first degree relatives: PATIENT UNABLE TO PROVIDE FAMILY HX    Today patient is on high flow nasal cannula, is answering questions, but is unable to provide appropriate answers.  He is being treated for severe sepsis, presumably from aspiration PNA.  Wife reports that patient had done a very little bit better at [place] [facility]- was sometimes more coherent, was walking very short distances with walker.  She has not noted any rigors today    In previous conversations and reitterated today, wife wants patient to be comfortable.  She does not see any overall improvement in his condition and feels that he will not return to his previous level of function.  We would agree.    ROS:    Unable to attain due to:  confusion                    Dyspnea (Carissa 0-10):   appears SOB, on high flow.  just received 2 mg IV morphine                     N/V (Y/N):      N                        Secretions (Y/N) :         N       Agitation(Y/N):   yes  Pain (Y/N):     appears uncomfortable overall, cannot provide details  -Provocation/Palliation:  -Quality/Quantity:  -Radiating:  -Severity:  -Timing/Frequency:  -Impact on ADLs:   Remainder of ROS unobtainable due to confusion.  Known details are above    Allergies    aspirin (Unknown)    Intolerances        Opiate Naive (Y/N): yes  :    Medications:      MEDICATIONS  (STANDING):  vitamin A &amp; D Ointment 1 Application(s) Topical daily    MEDICATIONS  (PRN):  HYDROmorphone  Injectable 1 milliGRAM(s) IV Push every 4 hours PRN SOB or pain or discomfort  HYDROmorphone  Injectable 0.5 milliGRAM(s) IV Push every 2 hours PRN if additional needed for SOB or discomfort  LORazepam   Injectable 2 milliGRAM(s) IV Push every 4 hours PRN agitatoin      Labs:    CBC:                        9.6    7.6   )-----------( 167      ( 20 Sep 2018 07:48 )             31.3     CMP:        147<H>  |  109<H>  |  37<H>  ----------------------------<  113<H>  4.5   |  21<L>  |  3.22<H>    Ca    8.8      20 Sep 2018 07:48  Phos  3.7     -  Mg     1.6         TPro  6.6  /  Alb  3.0<L>  /  TBili  0.9  /  DBili  x   /  AST  21  /  ALT  <5<L>  /  AlkPhos  60  -20    PT/INR - ( 19 Sep 2018 15:02 )   PT: 22.7 sec;   INR: 2.02          PTT - ( 19 Sep 2018 15:02 )  PTT:32.0 sec  Urinalysis Basic - ( 20 Sep 2018 09:30 )    Color: Yellow / Appearance: Cloudy / SG: >=1.030 / pH: x  Gluc: x / Ketone: Trace mg/dL  / Bili: Negative / Urobili: 0.2 E.U./dL   Blood: x / Protein: >=300 mg/dL / Nitrite: POSITIVE   Leuk Esterase: Moderate / RBC: > 10 /HPF / WBC Many /HPF   Sq Epi: x / Non Sq Epi: 0-5 /HPF / Bacteria: Many /HPF        Imaging:  Reviewed    PEx:  T(C): 36.8 (18 @ 08:52), Max: 39.1 (18 @ 18:30)  HR: 121 (18 @ 09:06) (78 - 142)  BP: 91/60 (18 @ 08:52) (78/51 - 246/112)  RR: 35 (18 @ 09:06) (25 - 40)  SpO2: 96% (18 @ 09:06) (86% - 100%)  Wt(kg): --  Daily Height in cm: 166.37 (19 Sep 2018 18:30)    Daily   CAPILLARY BLOOD GLUCOSE        I&O's Summary      General: ill appearing, on high flow nasal cannula, appears uncomfotable  HEENT:  plethoric, mucous membranes dry  Neck: supple, no adenopathy  CVS:  tachycardic  Resp: decreased at bases  GI:  mildly distended, non tender, normal BS  :  Good, minimal urine output  Musc:   sarcopenia  Neuro: non-focal, answering some questions, but not oriented except to self  Psych:   agitated  Skin:  numerous areas of sun damage  Lymph:no adenopathy  Preadmit Karnofsky:                            40 %           Current Karnofsky:    20 %  Cachexia (Y/N): no  BMI:    Advanced Directives:       DNR/DNI     MOLST      Decision maker: Wife, Aline Lutz  Legal surrogate: same    Social History: , history of ETOH abuse, but no alcohol for about one month    GOALS OF CARE DISCUSSION       Palliative care info/counseling provided	               Documentation of GOC: 	Comfort at what appears to be the EOL          REFERRALS	        Palliative Med   y       Hospice

## 2018-09-20 NOTE — PROGRESS NOTE ADULT - PROBLEM SELECTOR PLAN 4
Suspected; pt without significant history of CHF however respiratory status worsened further in the setting of significant HTNsive urgency/emergency while in ED; s/p lasix and discontinuation of fluids, still with minimal UOP  - in light of his tenuous volume and respiratory status, will consider additional lasix PRN but not continue standing fluid nor diuresis and instead perform serial re-assessments  - monitor CXR, UOP  - strict I/O, weights Suspected; pt without significant history of CHF however respiratory status worsened further in the setting of significant HTNsive urgency/emergency while in ED;   - Received IVF, no more fluids being given - Concern for aspiration pneumonia and HAP  - No ABx at this time.

## 2018-09-20 NOTE — PROGRESS NOTE ADULT - PROBLEM SELECTOR PLAN 1
Suspected source pulmonary - aspiration PNA high on DDx vs. HAP as recent discharge and prior IV abx administered within last 90 days; +lactate, +chan POA however initially anuric and at time of note writing severely oliguric therefore unable to obtain UA at present time to evaluate  source  - per GOC discussion w/ wife, will attempt trial of antimicrobial therapy with vancomycin and zosyn for both HAP, asp PNA or UTI coverage given presence of chan on admission  - unable to perform 30cc/kg IVF resuscitation as patient seemingly had flash pulmonary edema and lungs w/ diffuse bilateral crackles -- risk of giving IVF tremendously outweighs benefit currently  - wife requesting to minimize pain, therefore will defer additional phlebotomy at this time including repeat lactate level; reperfusion exam performed but still with signs of diminished end-organ perfusion  - f/u BCx already obtained by ED  - will attempt to obtain UA/UCx if patient begins making urine  - tylenol for fever and pain - UA positive for UTI  - Cannot r/o aspiration PNA vs. HAP currently as recent hospital discharge and prior IV abx administered within last 90 days; +lactate, +chan POA   - per GOC discussion w/ wife, comfort care measures and will D/C ABx  - wife requesting to minimize pain  - f/u BCx already obtained by ED  - UA positive for infection, UCx pending  - tylenol for fever and pain

## 2018-09-20 NOTE — PROGRESS NOTE ADULT - PROBLEM SELECTOR PLAN 2
Original presenting condition, possibly 2/2 aspiration PNA vs. HAP? Hypoxic despite NiV @ FiO2 100% that was further complicated by suspected flash pulmonary edema  - discussed extensively with wife at bedside regarding the use of BiPAP/NIV with respect to it not being something comfortable for the patient and its contraindication with minimal airway protection - she requests we continue it for now and will plan to de-escalate at a later time should our limited medical interventions fail to improve his condition  --> continue NiV  - monitor CXR  - morphine 1-2mg IVP q3hrs PRN resp distress, tachypnea, and/or air hunger - Currently on HFNC, weaned from BiPAP overnight, still with retractions and tachypnea  - Comfort care measures with no advancement in care  - Dilaudid and Ativan PRN respiratory distress or agitation  - With crackles bilaterally throughout both lungs and CXR indicative of pulmonary edema

## 2018-09-20 NOTE — PROGRESS NOTE ADULT - PROBLEM SELECTOR PLAN 8
Initial HTNsive urgency s/p labetalol, with labile BP and somewhat more normotensive  - holding antiHTNsives  - vitals qday per GOC discussion

## 2018-09-20 NOTE — DISCHARGE NOTE ADULT - SECONDARY DIAGNOSIS.
Transition of care performed with sharing of clinical summary Recurrent aspiration pneumonia ATN (acute tubular necrosis) Chronic atrial fibrillation Flash pulmonary edema Hypertension, unspecified type

## 2018-09-20 NOTE — PROGRESS NOTE ADULT - PROBLEM SELECTOR PLAN 5
Oliguric NILS concerning for developing ATN - likely 2/2 severe sepsis and conditions specified above; s/p lasix 40mg IVP x1 in ED with slight improvement from anuria to oliguria; still insufficient urine to obtain UA for urine studies  - in line with goals of care, will attempt limited and non-invasive evaluations of renal status via UA, lytes (if able to obtain) but will not pursue renal replacement therapy in event of fulminant renal failure Oliguric NILS concerning for developing ATN - likely 2/2 severe sepsis and conditions specified above Suspected; pt without significant history of CHF however respiratory status worsened further in the setting of significant HTNsive urgency/emergency while in ED;   - Received IVF, no more fluids being given

## 2018-09-20 NOTE — PROGRESS NOTE ADULT - PROBLEM SELECTOR PLAN 6
Currently in Afib w/ intermittent RVR  - holding eliquis as NPO  - will not heparinize in line with goals of care (wife requesting to minimize phlebotomy and pain provoking measures), risk outweighs benefit  - hold beta blockade in sepsis

## 2018-09-20 NOTE — PROGRESS NOTE ADULT - ASSESSMENT
This is a 89yo M recently discharged on 9/7 after extended hospitalization for toxic-metabolic encephalopathy who presents from [facility] w/ acute hypoxic respiratory distress; ED course c/b HTNsive urgency and possible flash pulmonary edema, also meeting severe sepsis criteria with suspected source aspiration PNA, also with MOLST orders for DNR/DNI and limited medical interventions re-affirmed by wife/HCP, who was being admitted to [facility] service for further management, on comfort measures now to be placed in hospice facility tomorrow.

## 2018-09-20 NOTE — CONSULT NOTE ADULT - ASSESSMENT
89 yo male readmitted after discharge 10 days ago for prolonged hospitalization following a fall complicated by renal failure which resolved, aspiration pneumonia, and delirium

## 2018-09-20 NOTE — CHART NOTE - NSCHARTNOTEFT_GEN_A_CORE
Goals of care again discussed with wife, Angle.   Confirmed DNR/ DNI status.   No MEWS or escalation of care.   Agreed to antibiotics and/or IV fluids only if necessary   No vital signs. No lab draws.   Primary goal is comfort.   Palliative following.

## 2018-09-20 NOTE — DISCHARGE NOTE ADULT - PATIENT PORTAL LINK FT
You can access the [facility] Patient Portal, offered by [facility], by registering with the following website: [email or web address]

## 2018-09-20 NOTE — PROGRESS NOTE ADULT - ATTENDING COMMENTS
Pt seen and examined by me earlier in AM with wife at bedside. Agree with housestaff's exam/a/p as noted above with additions,   pt answers simple question, incoherent and falls back to sleep.   VS as above on HFNC  +rhonchi mostly on R lung fields  +bs/nt/nd  +chan (no urine)  labs reviewed     a/p:  1. Acute hypoxic respiratory failure  2. Severe sepsis poa presumably Aspiration PNA  3. Pulmonary edema  4. Toxic metabolic encephalopathy  5. ATN    -poor progosis  -appreciate palliative consult; DNR/DNI, comfort care, no lab draws/no NEWS.

## 2018-09-20 NOTE — CONSULT NOTE ADULT - PROBLEM SELECTOR RECOMMENDATION 3
patient with steady decline in the last month beginning with a fall in the street, 2 weeks later a fall at home.  Patient "would not want to be this way" per his wife, and would want all efforts at symptom control and less aggressive medical interventions.

## 2018-09-20 NOTE — DISCHARGE NOTE ADULT - CARE PLAN
Principal Discharge DX:	Acute hypoxemic respiratory failure  Goal:	Comfort Care  Assessment and plan of treatment:	You were admitted with acute respiratory failure.  You were found at nursing home to be blue and with desaturation of oxygen and difficulty breathing.  This was likely due to fluid in your lungs and potentially a pneumonia.  It was decided that you would undergo comfort care measures and you will be treated for pain, distress, and agitation, but antibiotics would be discontinued.  Secondary Diagnosis:	Transition of care performed with sharing of clinical summary  Goal:	Comfort Care  Assessment and plan of treatment:	1) PCP Contacted on Admission: Yes; Name & Phone #: Dr. Reji Paulino [phone]  2) Date of Contact with PCP: 9/20  3) PCP Contacted at Discharge: (Y/N, N/A): Y  4) Summary of Handoff Given to PCP: Comfort care measures for resp. distress  5) Post-Discharge Appointment Date and Location: N/A  Secondary Diagnosis:	Recurrent aspiration pneumonia  Goal:	Comfort Care  Assessment and plan of treatment:	There is concern that this current episode is due to aspiration pneumonia.  Treatment was discontinued and you will undergo comfort care measures.  Secondary Diagnosis:	ATN (acute tubular necrosis)  Goal:	Comfort Care  Assessment and plan of treatment:	Due to low blood pressure, your kidneys started to fail and you did not produce urine.  Comfort care measures were initiated.  Secondary Diagnosis:	Chronic atrial fibrillation  Goal:	Comfort Care  Assessment and plan of treatment:	Treatment for atrial fibrillation was discontinued.  Secondary Diagnosis:	Flash pulmonary edema  Goal:	Comfort Care  Assessment and plan of treatment:	See plan for hypoxic respiratory failure  Secondary Diagnosis:	Hypertension, unspecified type  Goal:	Comfort Care  Assessment and plan of treatment:	You had elevated blood pressure in the ER.  It was treated with Nitroglycerin and Labetalol.  Due to comfort care measures, you will not be resuming treatment for hypertension.

## 2018-09-20 NOTE — DISCHARGE NOTE ADULT - PLAN OF CARE
Comfort Care You were admitted with acute respiratory failure.  You were found at nursing home to be blue and with desaturation of oxygen and difficulty breathing.  This was likely due to fluid in your lungs and potentially a pneumonia.  It was decided that you would undergo comfort care measures and you will be treated for pain, distress, and agitation, but antibiotics would be discontinued. 1) PCP Contacted on Admission: Yes; Name & Phone #: Dr. Reji Paulino [phone]  2) Date of Contact with PCP: 9/20  3) PCP Contacted at Discharge: (Y/N, N/A): Y  4) Summary of Handoff Given to PCP: Comfort care measures for resp. distress  5) Post-Discharge Appointment Date and Location: N/A There is concern that this current episode is due to aspiration pneumonia.  Treatment was discontinued and you will undergo comfort care measures. Due to low blood pressure, your kidneys started to fail and you did not produce urine.  Comfort care measures were initiated. Treatment for atrial fibrillation was discontinued. See plan for hypoxic respiratory failure You had elevated blood pressure in the ER.  It was treated with Nitroglycerin and Labetalol.  Due to comfort care measures, you will not be resuming treatment for hypertension.

## 2018-09-20 NOTE — DISCHARGE NOTE ADULT - MEDICATION SUMMARY - MEDICATIONS TO STOP TAKING
I will STOP taking the medications listed below when I get home from the hospital:    Sinemet 25 mg-100 mg oral tablet  -- 1 tab(s) by mouth 3 times a day    tamsulosin 0.4 mg oral capsule  -- 2 cap(s) by mouth once a day (at bedtime)    Lotrel 5 mg-40 mg oral capsule  -- 1 cap(s) by mouth once a day    finasteride 5 mg oral tablet  -- 1 tab(s) by mouth once a day    apixaban 5 mg oral tablet  -- 1 tab(s) by mouth every 12 hours    QUEtiapine  -- 12.5 milligram(s) by mouth once a day (at bedtime)    chlorhexidine 2% topical pad  -- 1 application on skin once a day    bacitracin 500 units/g topical ointment  -- 1 application on skin once a day    vitamin A & D topical ointment  -- 1 application on skin once a day    saliva substitutes oral solution  -- 5 milliliter(s) by mouth once a day    erythromycin 0.5% ophthalmic ointment  -- 1 application to each affected eye once a day    lactobacillus acidophilus oral capsule  -- 1 tab(s) by mouth 3 times a day with meals    folic acid 1 mg oral tablet  -- 1 tab(s) by mouth once a day    Daily Multiple Vitamins oral tablet  -- 1 tab(s) by mouth once a day    Vitamin B1 100 mg oral tablet  -- 1 tab(s) by mouth once a day    metoprolol tartrate 100 mg oral tablet  -- 1 tab(s) by mouth every 12 hours

## 2018-09-20 NOTE — PROGRESS NOTE ADULT - PROBLEM SELECTOR PLAN 10
.  1) PCP Contacted on Admission: Yes  Name & Phone #: Dr. Reji Paulino [phone]  2) Date of Contact with PCP: 9/20/18  3) PCP Contacted at Discharge: (Y/N, N/A): Yes  4) Summary of Handoff Given to PCP: Patient is comfort care with hospice measures  5) Post-Discharge Appointment Date and Location: N/A 1) PCP Contacted on Admission: Yes  Name & Phone #: Dr. Reji Paulino [phone]  2) Date of Contact with PCP: 9/20/18  3) PCP Contacted at Discharge: (Y/N, N/A): Yes  4) Summary of Handoff Given to PCP: Patient is comfort care with hospice measures  5) Post-Discharge Appointment Date and Location: N/A

## 2018-09-20 NOTE — PROGRESS NOTE ADULT - PROBLEM SELECTOR PLAN 3
as discussed above in problem #1  - mgmt as above - Concern for aspiration pneumonia and HAP  - No ABx at this time. worsening of renal function

## 2018-09-20 NOTE — CONSULT NOTE ADULT - PROBLEM SELECTOR RECOMMENDATION 4
Discussion with wife with William Willis SW and myself.  Wife wishes transfer to [facility] hospice at [facility].  Appropriate referral made    Support provided to patient and family. Patient to have access to supportive services during rest of hospital stay as the patient/family deemed necessary ie. Chaplaincy, Massage therapy, Music therapy, Patient and family supportive services, Palliative SW, etc.     as identified during the patients PSSA screening the patient would benefit from: transfer to hospice and continued support from [facility] team

## 2018-09-20 NOTE — CONSULT NOTE ADULT - PROBLEM SELECTOR RECOMMENDATION 9
patient returns with rigors fever and respiratory distress.  aspiration pneumonia.  Treatment started, but in further conversation with wife, we have refocused care on comfort.  Antibiotics will be discontinued, patient optimized on nasal O2.  Treated with IV Dilaudid for symptomatic relief

## 2018-09-21 VITALS
OXYGEN SATURATION: 93 % | RESPIRATION RATE: 40 BRPM | TEMPERATURE: 98 F | DIASTOLIC BLOOD PRESSURE: 70 MMHG | SYSTOLIC BLOOD PRESSURE: 107 MMHG | HEART RATE: 126 BPM

## 2018-09-21 PROCEDURE — 94660 CPAP INITIATION&MGMT: CPT

## 2018-09-21 PROCEDURE — 99239 HOSP IP/OBS DSCHRG MGMT >30: CPT

## 2018-09-21 PROCEDURE — 82553 CREATINE MB FRACTION: CPT

## 2018-09-21 PROCEDURE — 85730 THROMBOPLASTIN TIME PARTIAL: CPT

## 2018-09-21 PROCEDURE — 85025 COMPLETE CBC W/AUTO DIFF WBC: CPT

## 2018-09-21 PROCEDURE — 82803 BLOOD GASES ANY COMBINATION: CPT

## 2018-09-21 PROCEDURE — 85610 PROTHROMBIN TIME: CPT

## 2018-09-21 PROCEDURE — 87086 URINE CULTURE/COLONY COUNT: CPT

## 2018-09-21 PROCEDURE — 84100 ASSAY OF PHOSPHORUS: CPT

## 2018-09-21 PROCEDURE — 80053 COMPREHEN METABOLIC PANEL: CPT

## 2018-09-21 PROCEDURE — 36415 COLL VENOUS BLD VENIPUNCTURE: CPT

## 2018-09-21 PROCEDURE — 87186 SC STD MICRODIL/AGAR DIL: CPT

## 2018-09-21 PROCEDURE — 96374 THER/PROPH/DIAG INJ IV PUSH: CPT

## 2018-09-21 PROCEDURE — 71045 X-RAY EXAM CHEST 1 VIEW: CPT

## 2018-09-21 PROCEDURE — 84295 ASSAY OF SERUM SODIUM: CPT

## 2018-09-21 PROCEDURE — 83605 ASSAY OF LACTIC ACID: CPT

## 2018-09-21 PROCEDURE — 81001 URINALYSIS AUTO W/SCOPE: CPT

## 2018-09-21 PROCEDURE — 84484 ASSAY OF TROPONIN QUANT: CPT

## 2018-09-21 PROCEDURE — 83880 ASSAY OF NATRIURETIC PEPTIDE: CPT

## 2018-09-21 PROCEDURE — 87040 BLOOD CULTURE FOR BACTERIA: CPT

## 2018-09-21 PROCEDURE — 96375 TX/PRO/DX INJ NEW DRUG ADDON: CPT

## 2018-09-21 PROCEDURE — 94640 AIRWAY INHALATION TREATMENT: CPT

## 2018-09-21 PROCEDURE — 99291 CRITICAL CARE FIRST HOUR: CPT | Mod: 25

## 2018-09-21 PROCEDURE — 84132 ASSAY OF SERUM POTASSIUM: CPT

## 2018-09-21 PROCEDURE — 83735 ASSAY OF MAGNESIUM: CPT

## 2018-09-21 PROCEDURE — 93005 ELECTROCARDIOGRAM TRACING: CPT

## 2018-09-21 PROCEDURE — 82330 ASSAY OF CALCIUM: CPT

## 2018-09-21 PROCEDURE — 82550 ASSAY OF CK (CPK): CPT

## 2018-09-21 RX ADMIN — HYDROMORPHONE HYDROCHLORIDE 0.5 MILLIGRAM(S): 2 INJECTION INTRAMUSCULAR; INTRAVENOUS; SUBCUTANEOUS at 09:53

## 2018-09-21 RX ADMIN — HYDROMORPHONE HYDROCHLORIDE 0.5 MILLIGRAM(S): 2 INJECTION INTRAMUSCULAR; INTRAVENOUS; SUBCUTANEOUS at 10:08

## 2018-09-22 LAB
-  AMPICILLIN/SULBACTAM: SIGNIFICANT CHANGE UP
-  AMPICILLIN: SIGNIFICANT CHANGE UP
-  CEFAZOLIN: SIGNIFICANT CHANGE UP
-  CEFTRIAXONE: SIGNIFICANT CHANGE UP
-  CIPROFLOXACIN: SIGNIFICANT CHANGE UP
-  GENTAMICIN: SIGNIFICANT CHANGE UP
-  NITROFURANTOIN: SIGNIFICANT CHANGE UP
-  PIPERACILLIN/TAZOBACTAM: SIGNIFICANT CHANGE UP
-  TOBRAMYCIN: SIGNIFICANT CHANGE UP
-  TRIMETHOPRIM/SULFAMETHOXAZOLE: SIGNIFICANT CHANGE UP
CULTURE RESULTS: SIGNIFICANT CHANGE UP
METHOD TYPE: SIGNIFICANT CHANGE UP
METHOD TYPE: SIGNIFICANT CHANGE UP
ORGANISM # SPEC MICROSCOPIC CNT: SIGNIFICANT CHANGE UP
SPECIMEN SOURCE: SIGNIFICANT CHANGE UP

## 2018-09-24 DIAGNOSIS — A41.9 SEPSIS, UNSPECIFIED ORGANISM: ICD-10-CM

## 2018-09-24 DIAGNOSIS — G92 TOXIC ENCEPHALOPATHY: ICD-10-CM

## 2018-09-24 DIAGNOSIS — Z79.01 LONG TERM (CURRENT) USE OF ANTICOAGULANTS: ICD-10-CM

## 2018-09-24 DIAGNOSIS — N17.0 ACUTE KIDNEY FAILURE WITH TUBULAR NECROSIS: ICD-10-CM

## 2018-09-24 DIAGNOSIS — J69.0 PNEUMONITIS DUE TO INHALATION OF FOOD AND VOMIT: ICD-10-CM

## 2018-09-24 DIAGNOSIS — G20 PARKINSON'S DISEASE: ICD-10-CM

## 2018-09-24 DIAGNOSIS — N39.0 URINARY TRACT INFECTION, SITE NOT SPECIFIED: ICD-10-CM

## 2018-09-24 DIAGNOSIS — I10 ESSENTIAL (PRIMARY) HYPERTENSION: ICD-10-CM

## 2018-09-24 DIAGNOSIS — Z88.8 ALLERGY STATUS TO OTHER DRUGS, MEDICAMENTS AND BIOLOGICAL SUBSTANCES: ICD-10-CM

## 2018-09-24 DIAGNOSIS — B95.62 METHICILLIN RESISTANT STAPHYLOCOCCUS AUREUS INFECTION AS THE CAUSE OF DISEASES CLASSIFIED ELSEWHERE: ICD-10-CM

## 2018-09-24 DIAGNOSIS — R62.7 ADULT FAILURE TO THRIVE: ICD-10-CM

## 2018-09-24 DIAGNOSIS — I16.1 HYPERTENSIVE EMERGENCY: ICD-10-CM

## 2018-09-24 DIAGNOSIS — I95.9 HYPOTENSION, UNSPECIFIED: ICD-10-CM

## 2018-09-24 DIAGNOSIS — Z78.1 PHYSICAL RESTRAINT STATUS: ICD-10-CM

## 2018-09-24 DIAGNOSIS — R65.20 SEVERE SEPSIS WITHOUT SEPTIC SHOCK: ICD-10-CM

## 2018-09-24 DIAGNOSIS — Z66 DO NOT RESUSCITATE: ICD-10-CM

## 2018-09-24 DIAGNOSIS — Z51.5 ENCOUNTER FOR PALLIATIVE CARE: ICD-10-CM

## 2018-09-24 DIAGNOSIS — N40.0 BENIGN PROSTATIC HYPERPLASIA WITHOUT LOWER URINARY TRACT SYMPTOMS: ICD-10-CM

## 2018-09-24 DIAGNOSIS — J96.01 ACUTE RESPIRATORY FAILURE WITH HYPOXIA: ICD-10-CM

## 2018-09-24 DIAGNOSIS — I48.2 CHRONIC ATRIAL FIBRILLATION: ICD-10-CM

## 2018-09-24 LAB
CULTURE RESULTS: SIGNIFICANT CHANGE UP
CULTURE RESULTS: SIGNIFICANT CHANGE UP
SPECIMEN SOURCE: SIGNIFICANT CHANGE UP
SPECIMEN SOURCE: SIGNIFICANT CHANGE UP

## 2018-11-25 NOTE — ED ADULT TRIAGE NOTE - ACCOMPANIED BY
Message  The patient is 3-4 weeks s/p paraesophageal hernia repair by Dr. Jeff Luna. apparently she has been well but is constipated the last few days and that makes her nauseated. She had only a small bowel movement in the last 2 days. no fever, no vomiting. I suggested milk of magnesia and a fleets enema. I will discuss with Dr. Luna in the AM. Her phone number is [phone].      Signatures   Electronically signed by : JANELL FERNÁNDEZ MD; Feb 14 2017  6:10PM CST    
EMT/paramedic

## 2019-04-18 NOTE — DIETITIAN INITIAL EVALUATION ADULT. - PATIENT FOLLOWED BY PALLIATIVE CARE
86YO F with PMHx of HFrEF (29%) + Severe AS, PPM, b/l knee OA, diverticulitis, immune thrombocytopenia, dementia, presented to ED w/agitation and worsening forgetfulness.    IMPRESSION:   Dementia or other encephalopathy not secondary to infection  ROMIE secondary to lasix   Sepsis ruled out on admission, WBC 9  UA with mod epith cells, poor sample    #PLAN:  - DC antibiotics Statement Selected

## 2019-05-09 NOTE — PATIENT PROFILE ADULT. - NSSUBSTANCEUSE_GEN_ALL_CORE_SD
Patient aware of results. 5-9-19/lw  ----- Message from Arnav Bingham MD sent at 5/9/2019 11:07 AM EDT -----  LAW - Let her know that her pap and STD testing was negative.    
unable to assess

## 2020-01-14 NOTE — DIETITIAN INITIAL EVALUATION ADULT. - COPY OF WEIGHT IN KG
Detail Level: Detailed
Morphology Per Location (Optional): EXC:01/19
X Size Of Lesion In Cm (Optional): 0
85

## 2021-11-24 NOTE — ED ADULT NURSE NOTE - NSSISCREENINGQ4_ED_A_ED
"Subjective   Patient presents to ED with complaints of \"just not feeling well\". Patient has past medical history significant for anxiety, depression, COPD and continues to smoke. Patient with symptomatic complaints of generalized fatigue, nasal congestion and cough. No fever chills or recent illness.  Patient denies abdominal pain, nausea vomiting or diarrhea.  She does not wear supplemental oxygen at home.  She denies any other acute systemic complaints this time.  She has been vaccinated for COVID-19. Of note patient was seen and evaluated in this ED yesterday with a negative workup and discharged home with Rx for an albuterol inhaler, zofran for nausea and prednisone for treatment of her symptoms and diagnosis of shortness of breath, history of smoking and COPD.          Review of Systems   Constitutional: Positive for activity change and fatigue. Negative for chills and fever.   HENT: Positive for congestion and rhinorrhea.    Eyes: Negative.    Respiratory: Positive for cough and shortness of breath. Negative for chest tightness.    Cardiovascular: Negative.    Gastrointestinal: Negative.    Genitourinary: Negative.    Musculoskeletal: Positive for arthralgias and myalgias.   Skin: Negative.    Neurological: Negative.        Past Medical History:   Diagnosis Date   • Anxiety    • Depression    • Migraine    • Obesity    • Psoriasis        No Known Allergies    Past Surgical History:   Procedure Laterality Date   • NO PAST SURGERIES         Family History   Problem Relation Age of Onset   • Lymphoma Mother         mom passed at 82 with lymphoma   • Heart attack Father         dad passed at 63 with a heart attack   • No Known Problems Sister    • No Known Problems Brother    • No Known Problems Daughter        Social History     Socioeconomic History   • Marital status: Single   Tobacco Use   • Smoking status: Current Every Day Smoker     Packs/day: 0.50     Years: 38.00     Pack years: 19.00     Types: " Cigarettes     Start date: 1/1/1981   • Smokeless tobacco: Never Used   Substance and Sexual Activity   • Alcohol use: No   • Drug use: No   • Sexual activity: Not Currently           Objective   Physical Exam  Vitals and nursing note reviewed.   Constitutional:       General: She is not in acute distress.     Appearance: Normal appearance. She is not ill-appearing or toxic-appearing.   HENT:      Head: Normocephalic and atraumatic.      Nose: Nose normal.      Mouth/Throat:      Mouth: Mucous membranes are moist.   Eyes:      Extraocular Movements: Extraocular movements intact.      Conjunctiva/sclera: Conjunctivae normal.   Cardiovascular:      Rate and Rhythm: Normal rate and regular rhythm.   Pulmonary:      Effort: Pulmonary effort is normal. No respiratory distress.      Breath sounds: Wheezing present. No decreased breath sounds, rhonchi or rales.   Chest:      Chest wall: No tenderness.   Abdominal:      Palpations: Abdomen is soft.      Tenderness: There is no abdominal tenderness.   Musculoskeletal:         General: Normal range of motion.      Cervical back: Normal range of motion.   Skin:     General: Skin is warm and dry.   Neurological:      General: No focal deficit present.      Mental Status: She is alert.   Psychiatric:         Mood and Affect: Mood normal.         Behavior: Behavior normal.         Thought Content: Thought content normal.         Judgment: Judgment normal.         Procedures           ED Course  ED Course as of 11/24/21 1331   Wed Nov 24, 2021   1325 This patient presents with dyspnea, most likely secondary to smoking, COPD and viral URI. Differential diagnosis includes infectious source, COPD exacerbation, pneumonia, allergies. Presentation not consistent with acute cardiac etiologies to include ACS, CHF, pericardial effusion / tamponade . Presentation not consistent with acute respiratory etiologies to include acute PE or pneumothorax. Most consistent with COPD exacerbation,  allergic etiologies, or infectious etiologies such as PNA. No acute or emergent findings on physical exam. Labs without acute or emergent abnormalities. Chest x-ray without evidence of acute cardiopulmonary process.  Patient is afebrile, nontoxic appearing, vital signs stable and able to maintain O2 sats of 98% on room air. Patient will be discharged home with instructions to  her prescribed medications and outpatient follow up to primary care as recommended. Patient is agreeable to plan of care of outpatient follow up, provided clear return precautions and demonstrated understanding. [JG]      ED Course User Index  [JG] Juan Ortiz, PA      Recent Results (from the past 24 hour(s))   Comprehensive Metabolic Panel    Collection Time: 11/23/21  9:38 PM    Specimen: Blood   Result Value Ref Range    Glucose 107 (H) 65 - 99 mg/dL    BUN 20 6 - 20 mg/dL    Creatinine 0.82 0.57 - 1.00 mg/dL    Sodium 137 136 - 145 mmol/L    Potassium 3.6 3.5 - 5.2 mmol/L    Chloride 102 98 - 107 mmol/L    CO2 26.0 22.0 - 29.0 mmol/L    Calcium 9.5 8.6 - 10.5 mg/dL    Total Protein 6.8 6.0 - 8.5 g/dL    Albumin 4.00 3.50 - 5.20 g/dL    ALT (SGPT) 11 1 - 33 U/L    AST (SGOT) 13 1 - 32 U/L    Alkaline Phosphatase 118 (H) 39 - 117 U/L    Total Bilirubin 0.4 0.0 - 1.2 mg/dL    eGFR Non African Amer 72 >60 mL/min/1.73    Globulin 2.8 gm/dL    A/G Ratio 1.4 g/dL    BUN/Creatinine Ratio 24.4 7.0 - 25.0    Anion Gap 9.0 5.0 - 15.0 mmol/L   BNP    Collection Time: 11/23/21  9:38 PM    Specimen: Blood   Result Value Ref Range    proBNP 969.7 (H) 0.0 - 900.0 pg/mL   Troponin    Collection Time: 11/23/21  9:38 PM    Specimen: Blood   Result Value Ref Range    Troponin T 0.024 0.000 - 0.030 ng/mL   Green Top (Gel)    Collection Time: 11/23/21  9:38 PM   Result Value Ref Range    Extra Tube Hold for add-ons.    Lavender Top    Collection Time: 11/23/21  9:38 PM   Result Value Ref Range    Extra Tube hold for add-on    Gold Top - SST     Collection Time: 11/23/21  9:38 PM   Result Value Ref Range    Extra Tube Hold for add-ons.    Gray Top    Collection Time: 11/23/21  9:38 PM   Result Value Ref Range    Extra Tube Hold for add-ons.    Light Blue Top    Collection Time: 11/23/21  9:38 PM   Result Value Ref Range    Extra Tube hold for add-on    CBC Auto Differential    Collection Time: 11/23/21  9:38 PM    Specimen: Blood   Result Value Ref Range    WBC 13.61 (H) 3.40 - 10.80 10*3/mm3    RBC 4.34 3.77 - 5.28 10*6/mm3    Hemoglobin 12.6 12.0 - 15.9 g/dL    Hematocrit 38.9 34.0 - 46.6 %    MCV 89.6 79.0 - 97.0 fL    MCH 29.0 26.6 - 33.0 pg    MCHC 32.4 31.5 - 35.7 g/dL    RDW 13.0 12.3 - 15.4 %    RDW-SD 42.9 37.0 - 54.0 fl    MPV 10.2 6.0 - 12.0 fL    Platelets 345 140 - 450 10*3/mm3    Neutrophil % 81.3 (H) 42.7 - 76.0 %    Lymphocyte % 9.6 (L) 19.6 - 45.3 %    Monocyte % 7.6 5.0 - 12.0 %    Eosinophil % 0.7 0.3 - 6.2 %    Basophil % 0.4 0.0 - 1.5 %    Immature Grans % 0.4 0.0 - 0.5 %    Neutrophils, Absolute 11.06 (H) 1.70 - 7.00 10*3/mm3    Lymphocytes, Absolute 1.30 0.70 - 3.10 10*3/mm3    Monocytes, Absolute 1.03 (H) 0.10 - 0.90 10*3/mm3    Eosinophils, Absolute 0.10 0.00 - 0.40 10*3/mm3    Basophils, Absolute 0.06 0.00 - 0.20 10*3/mm3    Immature Grans, Absolute 0.06 (H) 0.00 - 0.05 10*3/mm3    nRBC 0.0 0.0 - 0.2 /100 WBC     Note: In addition to lab results from this visit, the labs listed above may include labs taken at another facility or during a different encounter within the last 24 hours. Please correlate lab times with ED admission and discharge times for further clarification of the services performed during this visit.    XR Chest 1 View   Final Result   No acute cardiopulmonary findings.      Signer Name: Isaiah Pruitt MD    Signed: 11/23/2021 10:15 PM    Workstation Name: [email or web address]     [facility]        Vitals:    11/23/21 2056 11/23/21 2336   BP: 135/98 116/61   BP Location: Left arm    Patient  "Position: Sitting    Pulse: 92 86   Resp: 18    Temp: 97.3 °F (36.3 °C)    TempSrc: Oral    SpO2: 100% 98%   Weight: 70.8 kg (156 lb)    Height: 172.7 cm (68\")      Medications - No data to display  ECG/EMG Results (last 24 hours)     ** No results found for the last 24 hours. **        ECG 12 Lead                                                MDM  Number of Diagnoses or Management Options  Chronic obstructive pulmonary disease, unspecified COPD type (HCC): established and worsening  History of smoking: established and worsening  Shortness of breath: established and worsening     Amount and/or Complexity of Data Reviewed  Clinical lab tests: reviewed  Tests in the radiology section of CPT®: reviewed  Tests in the medicine section of CPT®: reviewed  Decide to obtain previous medical records or to obtain history from someone other than the patient: yes    Risk of Complications, Morbidity, and/or Mortality  Presenting problems: moderate  Diagnostic procedures: moderate  Management options: moderate    Patient Progress  Patient progress: stable      Final diagnoses:   Shortness of breath   History of smoking   Chronic obstructive pulmonary disease, unspecified COPD type (HCC)       ED Disposition  ED Disposition     ED Disposition Condition Comment    Discharge Stable           Janessa Mccall, APRN  [address]  [address]  [place] [address]  [phone]    Call   As needed    [facility] Emergency Department  [address] [address] [phone]  Go to   If symptoms worsen         Medication List      No changes were made to your prescriptions during this visit.          Juan Ortiz PA  11/24/21 1331    " No

## 2022-01-01 NOTE — ED ADULT NURSE NOTE - OBJECTIVE STATEMENT
1 Principal Discharge DX:	Term  delivered by , current hospitalization  Assessment and plan of treatment:	- Follow-up with your pediatrician within 48 hours of discharge.   Routine Home Care Instructions:  - Please call us for help if you feel sad, blue or overwhelmed for more than a few days after discharge    - Umbilical cord care:        - Please keep your baby's cord clean and dry (do not apply alcohol)        - Please keep your baby's diaper below the umbilical cord until it has fallen off (~10-14 days)        - Please do not submerge your baby in a bath until the cord has fallen off (sponge bath instead)    - Continue feeding your child at least every 3 hours. Wake baby to feed if needed.     Please contact your pediatrician and return to the hospital if you notice any of the following:   - Fever  (T > 100.4)  - Reduced amount of wet diapers (< 5-6 per day) or no wet diaper in 12 hours  - Increased fussiness, irritability, or crying inconsolably  - Lethargy (excessively sleepy, difficult to arouse)  - Breathing difficulties (noisy breathing, breathing fast, using belly and neck muscles to breath)  - Changes in the baby’s color (yellow, blue, pale, gray)  - Seizure or loss of consciousness   patient alert and pleasant, does not recall circumstances for ED visit. as per EMS his  found him on the floor

## 2022-05-12 NOTE — ED ADULT NURSE NOTE - NS ED NURSE REPORT GIVEN TO FT
Assessment/ Plan:   Diagnoses and all orders for this visit:    1. Acute ear pain, left-appears to be from otitis media  -     CBC WITH AUTOMATED DIFF; Future  -     amoxicillin-clavulanate (AUGMENTIN) 875-125 mg per tablet; Take 1 Tablet by mouth every twelve (12) hours for 7 days. 2. Essential hypertension -controlled, continue with present meds  -     METABOLIC PANEL, COMPREHENSIVE; Future       3. Mixed hyperlipidemia-on Zocor with goal LDL of less than 100   -     LIPID PANEL; Future    4. Impaired fasting glucose-advised to dec carbs in diet ingrid with strong family hx of DM  -     HEMOGLOBIN A1C WITH EAG; Future    5. Hyperuricemia-goal uric acid level of less than 6 as she has had an episode of gout previously  -     URIC ACID; Future        Follow-up and Dispositions    · Return in about 3 months (around 8/12/2022) for follow up. Chief Complaint   Patient presents with    Ear Pain     left    Follow Up Chronic Condition       Pt is a 36y.o. year old female who presents for follow up of her chronic medical problems    Health Maintenance Due   Topic Date Due    COVID-19 Vaccine (3 - Booster for Moderna series) 09/30/2021      BP Readings from Last 3 Encounters:   05/12/22 131/87   01/19/22 (!) 135/93   09/23/21 (!) 150/103   home BPs normal-she brought her list  Only on Lisinopril HCT  Off Metoprolol for a while now    Current sxs started 4 days ago, left ear pain and noted left facial swelling down to the neck  Prior headache  No cold sx, no fever  Able to eat and swallow saliva  Feels that warmth comes and goes      ROS:    Pt denies: Wt loss, Fever/Chills, HA, Visual changes, Fatigue, Chest pain, SOB, COELLO, Abd pain, N/V/D/C, Blood in stool or urine, Edema. Pertinent positive as above in HPI.  All others were negative    Patient Active Problem List   Diagnosis Code    Essential hypertension I10    Mixed hyperlipidemia E78.2    Impaired fasting blood sugar R73.01    Elevated LFTs R79.89    Class 1 obesity with serious comorbidity in adult E66.9    Insomnia G47.00    Thickened endometrium R93.89    Fatty liver K76.0       Past Medical History:   Diagnosis Date    Hypercholesterolemia     Hypertension        Current Outpatient Medications   Medication Sig Dispense Refill    metoprolol succinate (TOPROL-XL) 25 mg XL tablet Take 1 Tablet by mouth daily. 30 Tablet 3    allopurinoL (ZYLOPRIM) 100 mg tablet Take 1 Tablet by mouth daily. 90 Tablet 1    lisinopril-hydroCHLOROthiazide (PRINZIDE, ZESTORETIC) 20-25 mg per tablet Take 1 Tablet by mouth daily. 90 Tablet 1    docosahexaenoic acid/epa (FISH OIL PO) Take 2,400 mg by mouth.  norethindrone-ethinyl estradiol (Junel FE 1/20, 28,) 1 mg-20 mcg (21)/75 mg (7) tab Junel FE 1/20 (28) 1 mg-20 mcg (21)/75 mg (7) tablet      simvastatin (ZOCOR) 40 mg tablet Take 1 Tablet by mouth nightly. 90 Tablet 1       Social History     Tobacco Use   Smoking Status Never Smoker   Smokeless Tobacco Never Used       Allergies   Allergen Reactions    Lipitor [Atorvastatin] Other (comments)     cramping       Patient Labs were reviewed: yes    Patient Past Records were reviewed: yes      Objective:     Vitals:    05/12/22 1200 05/12/22 1220   BP: (!) 144/85 131/87   Pulse: 74 78   Resp: 16    Temp: 98.4 °F (36.9 °C)    TempSrc: Temporal    SpO2: 99%    Weight: 140 lb (63.5 kg)    Height: 5' 2\" (1.575 m)      Body mass index is 25.61 kg/m². Exam:   Appearance: alert, well appearing,  oriented to person, place, and time, acyanotic, in no respiratory distress and well hydrated.   HEENT:  NC/AT, pink conj, anicteric sclerae, both TMS dull and bulging and slightly erythematous  Neck:  No cervical lymphadenopathy, no JVD, no thyromegaly, no carotid bruit  Heart:  RRR without M/R/G  Lungs:  CTAB, no rhonchi, rales, or wheezes with good air exchange   Abdomen:  Non-tender, pos bowel sounds, no hepatosplenomegaly  Ext:  No C/C/E    Skin: no rash  Neuro: no lateralizing signs, CNs II-XII intact            I have discussed the diagnosis with the patient and the intended plan as seen in the above orders. The patient has received an After-Visit Summary and questions were answered concerning future plans. Medication Side Effects and Warnings were discussed with patient: yes    Patient verbalized understanding of above instructions.     Yelitza Shirley MD  Internal Medicine  [facility]
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
## 2022-11-12 NOTE — PROGRESS NOTE BEHAVIORAL HEALTH - NSBHCONSORIP_PSY_A_CORE
not eating, not sleeping and intermittent abd pain since september
Consult...

## 2022-12-28 NOTE — PROGRESS NOTE ADULT - PROBLEM SELECTOR PLAN 5
In the setting of dehydration and obstruction from BPH, underlying CKD 3  - Cr today 1.183 (improving)  - trend BMP's  - dc'ing fluids as Cr improved and pt now tolerating PO intake  - f/u vanc level and dose according to level to avoid further kidney injury    #diarrhea- pt w diarrhea. rectal tube in place. cdiff ruled out. GI PCR neg  - c/w probiotics  - started pt on loperamide today  - continue to monitor n/a In the setting of dehydration and obstruction from BPH, underlying CKD 3  - Cr today 1.13 (improving)  - trend BMP's  - dc'd fluids as Cr improved and pt now tolerating PO intake  - f/u vanc level and dose according to level to avoid further kidney injury    #diarrhea- pt w diarrhea. rectal tube in place. cdiff ruled out. GI PCR neg  - c/w probiotics  - received 1 dose loperamide 9/1  - continue to monitor

## 2023-01-20 NOTE — DIETITIAN INITIAL EVALUATION ADULT. - DOB: +DATEOFBIRTH
Pharmacy Note  ED Culture Follow-up    Jared Guerrero is a 34 y.o. female. Allergies: Patient has no known allergies. Current antimicrobials:   Reviewed patient's throat culture - culture positive for group A strep. Patient was discharged on amoxicillin 500 mg by mouth twice daily, and culture is sensitive to prescribed medication. Antibiotic prescribed at discharge is appropriate - no changes made to antibiotic regimen. Please call with any questions.  NERY Rivers [facility], PharmD 10:27 AM 1/20/2023 Statement Selected

## 2023-04-04 NOTE — ED ADULT NURSE NOTE - NS ED TRIAGE CLINICAL UPGRADE PROVIDER FT
kelly
PHYSICAL EXAM:    GENERAL: NAD, well-groomed, well-developed  HEAD:  Atraumatic, Normocephalic  EYES: EOMI, PERRLA, conjunctiva and sclera clear  CHEST/LUNG: Clear to ausculation bilaterally; No rales, rhonchi, wheezing, or rubs  HEART: Regular rate and rhythm; No murmurs, rubs, or gallops  EXTREMITIES:  2+ Peripheral Pulses, No clubbing, cyanosis, or edema  SKIN: No rashes or lesions  NERVOUS SYSTEM:  Alert & Oriented X3; Motor Strength 5/5 B/L upper and lower extremities; DTRs 2+ intact and symmetric

## 2023-04-07 NOTE — H&P ADULT - PROBLEM SELECTOR PLAN 6
Interval History: Patient seen and examined. NAEON. Nausea improving. No more vomiting. Wanted to try eating today so full liquids ordered.    Review of Systems   Constitutional:  Negative for chills and fever.   Respiratory:  Negative for shortness of breath.    Cardiovascular:  Negative for chest pain.   Gastrointestinal:  Positive for nausea. Negative for abdominal pain, constipation, diarrhea and vomiting.   Objective:     Vital Signs (Most Recent):  Temp: 97.5 °F (36.4 °C) (04/07/23 1537)  Pulse: 64 (04/07/23 1537)  Resp: 18 (04/07/23 1537)  BP: 128/64 (04/07/23 1537)  SpO2: (!) 92 % (04/07/23 1537)   Vital Signs (24h Range):  Temp:  [97.5 °F (36.4 °C)-98.4 °F (36.9 °C)] 97.5 °F (36.4 °C)  Pulse:  [64-76] 64  Resp:  [16-18] 18  SpO2:  [91 %-97 %] 92 %  BP: (128-190)/(64-96) 128/64     Weight: 81.6 kg (180 lb)  Body mass index is 31.89 kg/m².  No intake or output data in the 24 hours ending 04/07/23 1608   Physical Exam  Vitals reviewed.   Constitutional:       General: She is not in acute distress.  HENT:      Head: Normocephalic and atraumatic.   Cardiovascular:      Rate and Rhythm: Normal rate and regular rhythm.   Pulmonary:      Effort: Pulmonary effort is normal. No respiratory distress.      Breath sounds: Normal breath sounds.   Abdominal:      General: Abdomen is flat. Bowel sounds are normal. There is no distension.      Palpations: Abdomen is soft.      Tenderness: There is no abdominal tenderness. There is no guarding.   Neurological:      Mental Status: She is alert.   Psychiatric:         Mood and Affect: Affect normal.         Behavior: Behavior normal.         Thought Content: Thought content normal.       Significant Labs: All pertinent labs within the past 24 hours have been reviewed.    Significant Imaging: I have reviewed all pertinent imaging results/findings within the past 24 hours.   Currently in Afib w/ intermittent RVR  - holding eliquis as NPO  - will not heparinize in line with goals of care (wife requesting to minimize phlebotomy and pain provoking measures), risk outweighs benefit  - hold beta blockade in sepsis
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
## 2023-10-05 NOTE — PROGRESS NOTE ADULT - PROBLEM SELECTOR PROBLEM 3
Discharge Instructions    Follow-up in the office in 6 wks.  Call the office at [phone] for an appointment.  Maintain pelvic rest for the next 6 weeks.  Do not lift, pull or push anything more than 10 to 15 pounds.  Nothing in the vagina (no tampons, douching or intercourse)  No soaking tub baths    When to Call  Increasing vaginal bleeding  Foul smelling or abnormal vaginal discharge  Fever, chills, nausea or vomiting  Pain unrelieved by pain medication  Any symptom worrisome to you   Mother Baby Discharge Instructions       APPOINTMENTS:  You must call your provider to schedule or change follow-up appointments. As instructed, it is very important to see your and your baby's doctor.  PLEASE CALL TO MAKE YOUR APPOINTMENTS AFTER DISCHARGE    Call your provider if:  You have a temperature greater than 100.4 F  Passing blood clots larger than a size of an egg  Your bleeding goes back to bright red or has a foul odor  You experience any signs of infection: redness, swelling, drainage or opening of surgical incision  Preeclampsia symptoms: Stomach pain, Headaches, Feeling Nauseous, Seeing spots, Swelling in hands and face, Gaining more than 5 lbs in a week (up to 6 wks after delivery) For more information go to [email or web address].  MEDICATION  Take your medications as instructed by your provider on the Medication Reconciliation Form given to you at discharge. Please call your provider's office number if you need additional refills or have questions regarding medications once you are home.    [facility] Services:  Mother/Baby unit: [phone]  Lactation Consultants: [phone]  : [phone]  Special Care Nursery: [phone]  Support Groups:  New Mom/Baby and Breastfeeding:    Every Tuesday 11:00 a.m to 1:00 p.m (infants under 8 weeks)   Every Tuesday 12:00 p.m to 1:30 p.m ( all ages support group)         Beyond Baby Blues:    Every Tuesday at 9:30 a.m. to 11:00 a.m.    Discharge  Instructions: Please refer to INJOY video.  MATERNAL  Perineal care (care of your episiotomy and hemorrhoids)      Whether you have an episiotomy or not, continue to use your squirt bottle each time you use the bathroom for as long as you are bleeding. Call your provider if you notice swelling, redness or a bad odor from episiotomy site. Stitches will dissolve over next 7-10 days. Tucks can be used for hemorrhoids.  Vaginal Bleeding          Call your provider if you are saturating a fabiano-pad in less than one hour or bleeding becomes bright red again, or you notice a foul odor.  Bleeding will decrease over the next 4-6 weeks after delivery.  Postpartum Depression      Mood swings, crying spells and irritability are common after giving birth. If symptoms are severe or last for two weeks or you experience difficulty caring for yourself or your baby or coping with family relations, call your provider.   InJoy educational video viewed by patient, reviewed with by RN, and all questions answered. If you are a smoker, we encourage you to explore options for quitting. Caregivers may have spoken with you about this while you were in the hospital. We encourage you to talk to your doctor about nicotine replacement options. Smoking cessation is the best thing you can do for your health.   Tobacco Quit Line is free of charge to [place] residents. Please call 3-002-QUIT-NOW for information and resources available to you.    Toxic metabolic encephalopathy

## 2023-11-04 NOTE — PROGRESS NOTE ADULT - PROBLEM SELECTOR PLAN 4
Pt has h/o of a-fib on home Coumadin and Toprol XL 200mg. Was difficult to rate control earlier this admission likely 2/2 missed doses of Toprol XL  - on lopressor for rate control. Pt has been tachycardic 90s-120s  - c/w lopressor 75 BID  - INR <2 9/1. c/w Eliquis for further anticoagulation  - if monitor says pt is tachycardic, palpate HR for 1 minute for more accurate rate    #Hyponatremia  - pt continues to have fluctuations in sodium. LR @75cc/hr started today AM. Sodium today 139. May be 2/2 SIADH vs. diarrhea vs. NILS  - continue to monitor No Pt has h/o of a-fib on home Coumadin and Toprol XL 200mg. Was difficult to rate control earlier this admission likely 2/2 missed doses of Toprol XL  - on lopressor for rate control. Pt has been tachycardic 90s-120s  - c/w lopressor 75 BID (may increase if pt continues to be tachy)  - INR <2 9/1. c/w Eliquis for further anticoagulation  - if monitor says pt is tachycardic, palpate HR for 1 minute for more accurate rate

## 2023-12-01 NOTE — H&P ADULT - PROBLEM SELECTOR PLAN 3
No Plan as above, most likely in setting of dehydration, patient with underlying CKD Stage 3.   -Downtrending BUN/Cr in setting of IVF  -Strict I/O  -Monitor BMP

## 2024-01-19 NOTE — PROGRESS NOTE ADULT - PROBLEM SELECTOR PLAN 6
F: No IVF needed  E: Replete PRN  N: MEchanical soft with nectar thin liquids, aspiration precautions  Prophy: Eliquis  DNR/DNI.  Dispo: Continue workup for AMS on RMF F: No IVF needed  E: Replete PRN  N: Mechanical soft with nectar thin liquids, aspiration precautions  Prophy: Eliquis  DNR/DNI.  Dispo: Continue workup for AMS on RMF Oriented - self; Oriented - place; Oriented - time - Normotensive, continue monitoring    #BPH  - Continue home finasteride and Flomax

## 2024-03-16 NOTE — PHYSICAL THERAPY INITIAL EVALUATION ADULT - ADDITIONAL COMMENTS
1/10/2021   6#10
Patient lives in elevator building with no steps to enter. As per spouse present, patient with no DME use PTA.

## 2024-12-05 NOTE — PROGRESS NOTE ADULT - ASSESSMENT
89 yo with Parkinsons found face down at home following a fall 24-48 hours previously.  Dehydrated hypotensive, renal failure.  Perhaps some role of alcohol although screen was negative.  Renal failure had improved but patient reemained confused.  Now with likely aspiration and sepsis transferred to and medically is  doing better 9 years or older (need ONE dose)...